# Patient Record
Sex: FEMALE | Race: WHITE | NOT HISPANIC OR LATINO | URBAN - METROPOLITAN AREA
[De-identification: names, ages, dates, MRNs, and addresses within clinical notes are randomized per-mention and may not be internally consistent; named-entity substitution may affect disease eponyms.]

---

## 2017-06-19 ENCOUNTER — EMERGENCY (EMERGENCY)
Facility: HOSPITAL | Age: 82
LOS: 1 days | Discharge: HOME | End: 2017-06-19
Attending: EMERGENCY MEDICINE | Admitting: EMERGENCY MEDICINE
Payer: MEDICARE

## 2017-06-19 VITALS
SYSTOLIC BLOOD PRESSURE: 173 MMHG | RESPIRATION RATE: 18 BRPM | DIASTOLIC BLOOD PRESSURE: 74 MMHG | HEART RATE: 68 BPM | OXYGEN SATURATION: 99 %

## 2017-06-19 VITALS
SYSTOLIC BLOOD PRESSURE: 174 MMHG | DIASTOLIC BLOOD PRESSURE: 110 MMHG | OXYGEN SATURATION: 98 % | RESPIRATION RATE: 17 BRPM | HEART RATE: 62 BPM

## 2017-06-19 LAB
ALBUMIN SERPL ELPH-MCNC: 3.8 G/DL — SIGNIFICANT CHANGE UP (ref 3.3–5)
ALP SERPL-CCNC: 65 U/L — SIGNIFICANT CHANGE UP (ref 40–120)
ALT FLD-CCNC: 20 U/L — SIGNIFICANT CHANGE UP (ref 4–33)
AST SERPL-CCNC: 25 U/L — SIGNIFICANT CHANGE UP (ref 4–32)
BASOPHILS # BLD AUTO: 0.03 K/UL — SIGNIFICANT CHANGE UP (ref 0–0.2)
BASOPHILS NFR BLD AUTO: 0.2 % — SIGNIFICANT CHANGE UP (ref 0–2)
BILIRUB SERPL-MCNC: 0.4 MG/DL — SIGNIFICANT CHANGE UP (ref 0.2–1.2)
BUN SERPL-MCNC: 25 MG/DL — HIGH (ref 7–23)
CALCIUM SERPL-MCNC: 9.3 MG/DL — SIGNIFICANT CHANGE UP (ref 8.4–10.5)
CHLORIDE SERPL-SCNC: 94 MMOL/L — LOW (ref 98–107)
CO2 SERPL-SCNC: 28 MMOL/L — SIGNIFICANT CHANGE UP (ref 22–31)
CREAT SERPL-MCNC: 1.02 MG/DL — SIGNIFICANT CHANGE UP (ref 0.5–1.3)
DIGOXIN SERPL-MCNC: 0.9 NG/ML — SIGNIFICANT CHANGE UP (ref 0.8–2)
EOSINOPHIL # BLD AUTO: 0.15 K/UL — SIGNIFICANT CHANGE UP (ref 0–0.5)
EOSINOPHIL NFR BLD AUTO: 1 % — SIGNIFICANT CHANGE UP (ref 0–6)
GLUCOSE SERPL-MCNC: 152 MG/DL — HIGH (ref 70–99)
HCT VFR BLD CALC: 43.9 % — SIGNIFICANT CHANGE UP (ref 34.5–45)
HGB BLD-MCNC: 14.6 G/DL — SIGNIFICANT CHANGE UP (ref 11.5–15.5)
IMM GRANULOCYTES NFR BLD AUTO: 0.9 % — SIGNIFICANT CHANGE UP (ref 0–1.5)
LYMPHOCYTES # BLD AUTO: 18.9 % — SIGNIFICANT CHANGE UP (ref 13–44)
LYMPHOCYTES # BLD AUTO: 2.86 K/UL — SIGNIFICANT CHANGE UP (ref 1–3.3)
MCHC RBC-ENTMCNC: 30.4 PG — SIGNIFICANT CHANGE UP (ref 27–34)
MCHC RBC-ENTMCNC: 33.3 % — SIGNIFICANT CHANGE UP (ref 32–36)
MCV RBC AUTO: 91.5 FL — SIGNIFICANT CHANGE UP (ref 80–100)
MONOCYTES # BLD AUTO: 1.49 K/UL — HIGH (ref 0–0.9)
MONOCYTES NFR BLD AUTO: 9.8 % — SIGNIFICANT CHANGE UP (ref 2–14)
NEUTROPHILS # BLD AUTO: 10.48 K/UL — HIGH (ref 1.8–7.4)
NEUTROPHILS NFR BLD AUTO: 69.2 % — SIGNIFICANT CHANGE UP (ref 43–77)
PLATELET # BLD AUTO: 244 K/UL — SIGNIFICANT CHANGE UP (ref 150–400)
PMV BLD: 9.6 FL — SIGNIFICANT CHANGE UP (ref 7–13)
POTASSIUM SERPL-MCNC: 3.7 MMOL/L — SIGNIFICANT CHANGE UP (ref 3.5–5.3)
POTASSIUM SERPL-SCNC: 3.7 MMOL/L — SIGNIFICANT CHANGE UP (ref 3.5–5.3)
PROT SERPL-MCNC: 6.9 G/DL — SIGNIFICANT CHANGE UP (ref 6–8.3)
RBC # BLD: 4.8 M/UL — SIGNIFICANT CHANGE UP (ref 3.8–5.2)
RBC # FLD: 12.9 % — SIGNIFICANT CHANGE UP (ref 10.3–14.5)
SODIUM SERPL-SCNC: 137 MMOL/L — SIGNIFICANT CHANGE UP (ref 135–145)
WBC # BLD: 15.15 K/UL — HIGH (ref 3.8–10.5)
WBC # FLD AUTO: 15.15 K/UL — HIGH (ref 3.8–10.5)

## 2017-06-19 PROCEDURE — 71020: CPT | Mod: 26

## 2017-06-19 PROCEDURE — 99284 EMERGENCY DEPT VISIT MOD MDM: CPT | Mod: GC

## 2017-06-19 RX ORDER — IPRATROPIUM/ALBUTEROL SULFATE 18-103MCG
3 AEROSOL WITH ADAPTER (GRAM) INHALATION ONCE
Qty: 0 | Refills: 0 | Status: COMPLETED | OUTPATIENT
Start: 2017-06-19 | End: 2017-06-19

## 2017-06-19 RX ADMIN — Medication 3 MILLILITER(S): at 11:19

## 2017-06-19 NOTE — ED PROVIDER NOTE - MEDICAL DECISION MAKING DETAILS
89 f h/o htn and chf, with bilat LE numbness, no focal deficits on exam, with wheezing and diffuse rhonchi, will get basic labs, digoxin level, cxr, duonebs and reassess

## 2017-06-19 NOTE — ED PROVIDER NOTE - OBJECTIVE STATEMENT
89 F h/o chf, HTN, p/w bilat LE numbness beginning suddenly this AM with lip tingling. Patient denies symptoms like this in the past. No weakness/tingling of extremities. Was able to ambulate. No facial droop/change in speech. No fevers/chills. Patient also with cough and congestion x 1 week, on cipro and steroids from pmd. No back pain, urinary/stool incontinence.

## 2017-06-19 NOTE — ED PROVIDER NOTE - ATTENDING CONTRIBUTION TO CARE
I performed a face to face bedside interview with patient regarding history of present illness, review of symptoms and past medical history. I completed an independent physical exam.  I have discussed patient's plan of care.   I agree with note as stated above, having amended the EMR as needed to reflect my findings. I have discussed the assessment and plan of care.  This includes during the time I functioned as the attending physician for this patient.  Attending Contribution to Care:  agree with plan of resident, pt p/w mild paresthesias b/l lower extremities with no radiation no headache. neuro exam benign. labs wnl except leukocytosis a/w recent steroid use. pt stable for d/c. f/u with pmd. pt requesting d/c. understands risks. labs wnl, vss.

## 2017-06-19 NOTE — ED ADULT TRIAGE NOTE - CHIEF COMPLAINT QUOTE
pt c/o of lower extremity weakness and tingling to her lips since 8 am this morning. pt recently placed on cipro to treat a URI. pt hypertensive in triage, took her htn medication as rx'd this morning  MD Zaidi called to triage for stroke eval. no code stroke called

## 2017-06-19 NOTE — ED ADULT NURSE NOTE - OBJECTIVE STATEMENT
Pt rec'd in TRB with family at bedside, c/o BLE numbness since this morning when she got out of the bed, was able to ambulate with walker, didn't feel any weakness or pain, denies tingling. Pt also reports a period of numbness to lips that has since resolved. Pt is A&Ox4 and at her baseline mental status per family. Pt currently on Cipro. Abd noted distended at baseline.

## 2017-10-28 ENCOUNTER — INPATIENT (INPATIENT)
Facility: HOSPITAL | Age: 82
LOS: 2 days | Discharge: HOME | End: 2017-10-31
Attending: HOSPITALIST | Admitting: HOSPITALIST
Payer: MEDICARE

## 2017-10-28 VITALS
DIASTOLIC BLOOD PRESSURE: 106 MMHG | SYSTOLIC BLOOD PRESSURE: 166 MMHG | HEART RATE: 61 BPM | RESPIRATION RATE: 18 BRPM | OXYGEN SATURATION: 99 % | TEMPERATURE: 98 F

## 2017-10-28 DIAGNOSIS — I50.9 HEART FAILURE, UNSPECIFIED: ICD-10-CM

## 2017-10-28 DIAGNOSIS — Z29.9 ENCOUNTER FOR PROPHYLACTIC MEASURES, UNSPECIFIED: ICD-10-CM

## 2017-10-28 DIAGNOSIS — I10 ESSENTIAL (PRIMARY) HYPERTENSION: ICD-10-CM

## 2017-10-28 DIAGNOSIS — Z90.49 ACQUIRED ABSENCE OF OTHER SPECIFIED PARTS OF DIGESTIVE TRACT: Chronic | ICD-10-CM

## 2017-10-28 DIAGNOSIS — D72.829 ELEVATED WHITE BLOOD CELL COUNT, UNSPECIFIED: ICD-10-CM

## 2017-10-28 DIAGNOSIS — R73.9 HYPERGLYCEMIA, UNSPECIFIED: ICD-10-CM

## 2017-10-28 LAB
ALBUMIN SERPL ELPH-MCNC: 3.7 G/DL — SIGNIFICANT CHANGE UP (ref 3.3–5)
ALP SERPL-CCNC: 130 U/L — HIGH (ref 40–120)
ALT FLD-CCNC: 10 U/L — SIGNIFICANT CHANGE UP (ref 4–33)
AST SERPL-CCNC: 14 U/L — SIGNIFICANT CHANGE UP (ref 4–32)
BASE EXCESS BLDV CALC-SCNC: 6.2 MMOL/L — SIGNIFICANT CHANGE UP
BASOPHILS # BLD AUTO: 0.08 K/UL — SIGNIFICANT CHANGE UP (ref 0–0.2)
BASOPHILS NFR BLD AUTO: 0.4 % — SIGNIFICANT CHANGE UP (ref 0–2)
BASOPHILS NFR SPEC: 0 % — SIGNIFICANT CHANGE UP (ref 0–2)
BILIRUB SERPL-MCNC: 0.3 MG/DL — SIGNIFICANT CHANGE UP (ref 0.2–1.2)
BLOOD GAS VENOUS - CREATININE: 1.06 MG/DL — SIGNIFICANT CHANGE UP (ref 0.5–1.3)
BUN SERPL-MCNC: 22 MG/DL — SIGNIFICANT CHANGE UP (ref 7–23)
CALCIUM SERPL-MCNC: 9.5 MG/DL — SIGNIFICANT CHANGE UP (ref 8.4–10.5)
CHLORIDE BLDV-SCNC: 97 MMOL/L — SIGNIFICANT CHANGE UP (ref 96–108)
CHLORIDE SERPL-SCNC: 94 MMOL/L — LOW (ref 98–107)
CK MB BLD-MCNC: 5.37 NG/ML — HIGH (ref 1–4.7)
CK MB BLD-MCNC: SIGNIFICANT CHANGE UP (ref 0–2.5)
CK SERPL-CCNC: 81 U/L — SIGNIFICANT CHANGE UP (ref 25–170)
CO2 SERPL-SCNC: 30 MMOL/L — SIGNIFICANT CHANGE UP (ref 22–31)
CREAT SERPL-MCNC: 1.06 MG/DL — SIGNIFICANT CHANGE UP (ref 0.5–1.3)
EOSINOPHIL # BLD AUTO: 2.95 K/UL — HIGH (ref 0–0.5)
EOSINOPHIL NFR BLD AUTO: 16.2 % — HIGH (ref 0–6)
EOSINOPHIL NFR FLD: 10 % — HIGH (ref 0–6)
GAS PNL BLDV: 136 MMOL/L — SIGNIFICANT CHANGE UP (ref 136–146)
GLUCOSE BLDV-MCNC: 197 — HIGH (ref 70–99)
GLUCOSE SERPL-MCNC: 186 MG/DL — HIGH (ref 70–99)
HCO3 BLDV-SCNC: 28 MMOL/L — HIGH (ref 20–27)
HCT VFR BLD CALC: 42.1 % — SIGNIFICANT CHANGE UP (ref 34.5–45)
HCT VFR BLDV CALC: 45 % — SIGNIFICANT CHANGE UP (ref 34.5–45)
HGB BLD-MCNC: 14.1 G/DL — SIGNIFICANT CHANGE UP (ref 11.5–15.5)
HGB BLDV-MCNC: 14.7 G/DL — SIGNIFICANT CHANGE UP (ref 11.5–15.5)
IMM GRANULOCYTES # BLD AUTO: 0.13 # — SIGNIFICANT CHANGE UP
IMM GRANULOCYTES NFR BLD AUTO: 0.7 % — SIGNIFICANT CHANGE UP (ref 0–1.5)
LACTATE BLDV-MCNC: 1.6 MMOL/L — SIGNIFICANT CHANGE UP (ref 0.5–2)
LYMPHOCYTES # BLD AUTO: 1.59 K/UL — SIGNIFICANT CHANGE UP (ref 1–3.3)
LYMPHOCYTES # BLD AUTO: 8.7 % — LOW (ref 13–44)
LYMPHOCYTES NFR SPEC AUTO: 6 % — LOW (ref 13–44)
MANUAL SMEAR VERIFICATION: SIGNIFICANT CHANGE UP
MCHC RBC-ENTMCNC: 29.9 PG — SIGNIFICANT CHANGE UP (ref 27–34)
MCHC RBC-ENTMCNC: 33.5 % — SIGNIFICANT CHANGE UP (ref 32–36)
MCV RBC AUTO: 89.2 FL — SIGNIFICANT CHANGE UP (ref 80–100)
MONOCYTES # BLD AUTO: 1.05 K/UL — HIGH (ref 0–0.9)
MONOCYTES NFR BLD AUTO: 5.8 % — SIGNIFICANT CHANGE UP (ref 2–14)
MONOCYTES NFR BLD: 5 % — SIGNIFICANT CHANGE UP (ref 2–9)
NEUTROPHIL AB SER-ACNC: 79 % — HIGH (ref 43–77)
NEUTROPHILS # BLD AUTO: 12.38 K/UL — HIGH (ref 1.8–7.4)
NEUTROPHILS NFR BLD AUTO: 68.2 % — SIGNIFICANT CHANGE UP (ref 43–77)
NRBC # FLD: 0 — SIGNIFICANT CHANGE UP
NT-PROBNP SERPL-SCNC: 838.5 PG/ML — SIGNIFICANT CHANGE UP
PCO2 BLDV: 52 MMHG — HIGH (ref 41–51)
PH BLDV: 7.39 PH — SIGNIFICANT CHANGE UP (ref 7.32–7.43)
PLATELET # BLD AUTO: 204 K/UL — SIGNIFICANT CHANGE UP (ref 150–400)
PMV BLD: 9.3 FL — SIGNIFICANT CHANGE UP (ref 7–13)
PO2 BLDV: 35 MMHG — SIGNIFICANT CHANGE UP (ref 35–40)
POTASSIUM BLDV-SCNC: 3.3 MMOL/L — LOW (ref 3.4–4.5)
POTASSIUM SERPL-MCNC: 3.5 MMOL/L — SIGNIFICANT CHANGE UP (ref 3.5–5.3)
POTASSIUM SERPL-SCNC: 3.5 MMOL/L — SIGNIFICANT CHANGE UP (ref 3.5–5.3)
PROT SERPL-MCNC: 7.1 G/DL — SIGNIFICANT CHANGE UP (ref 6–8.3)
RBC # BLD: 4.72 M/UL — SIGNIFICANT CHANGE UP (ref 3.8–5.2)
RBC # FLD: 12.3 % — SIGNIFICANT CHANGE UP (ref 10.3–14.5)
SAO2 % BLDV: 59.8 % — LOW (ref 60–85)
SODIUM SERPL-SCNC: 137 MMOL/L — SIGNIFICANT CHANGE UP (ref 135–145)
TROPONIN T SERPL-MCNC: < 0.06 NG/ML — SIGNIFICANT CHANGE UP (ref 0–0.06)
WBC # BLD: 18.18 K/UL — HIGH (ref 3.8–10.5)
WBC # FLD AUTO: 18.18 K/UL — HIGH (ref 3.8–10.5)

## 2017-10-28 PROCEDURE — 99223 1ST HOSP IP/OBS HIGH 75: CPT

## 2017-10-28 PROCEDURE — 71010: CPT | Mod: 26

## 2017-10-28 RX ORDER — SENNA PLUS 8.6 MG/1
2 TABLET ORAL AT BEDTIME
Qty: 0 | Refills: 0 | Status: DISCONTINUED | OUTPATIENT
Start: 2017-10-28 | End: 2017-10-31

## 2017-10-28 RX ORDER — IPRATROPIUM/ALBUTEROL SULFATE 18-103MCG
3 AEROSOL WITH ADAPTER (GRAM) INHALATION
Qty: 0 | Refills: 0 | Status: COMPLETED | OUTPATIENT
Start: 2017-10-28 | End: 2017-10-28

## 2017-10-28 RX ORDER — ASPIRIN/CALCIUM CARB/MAGNESIUM 324 MG
81 TABLET ORAL DAILY
Qty: 0 | Refills: 0 | Status: DISCONTINUED | OUTPATIENT
Start: 2017-10-28 | End: 2017-10-31

## 2017-10-28 RX ORDER — HEPARIN SODIUM 5000 [USP'U]/ML
5000 INJECTION INTRAVENOUS; SUBCUTANEOUS EVERY 12 HOURS
Qty: 0 | Refills: 0 | Status: DISCONTINUED | OUTPATIENT
Start: 2017-10-28 | End: 2017-10-31

## 2017-10-28 RX ORDER — POTASSIUM CHLORIDE 20 MEQ
40 PACKET (EA) ORAL ONCE
Qty: 0 | Refills: 0 | Status: COMPLETED | OUTPATIENT
Start: 2017-10-28 | End: 2017-10-28

## 2017-10-28 RX ORDER — DOCUSATE SODIUM 100 MG
100 CAPSULE ORAL DAILY
Qty: 0 | Refills: 0 | Status: DISCONTINUED | OUTPATIENT
Start: 2017-10-28 | End: 2017-10-31

## 2017-10-28 RX ORDER — DIGOXIN 250 MCG
0.12 TABLET ORAL DAILY
Qty: 0 | Refills: 0 | Status: DISCONTINUED | OUTPATIENT
Start: 2017-10-28 | End: 2017-10-31

## 2017-10-28 RX ORDER — MAGNESIUM SULFATE 500 MG/ML
1 VIAL (ML) INJECTION ONCE
Qty: 0 | Refills: 0 | Status: COMPLETED | OUTPATIENT
Start: 2017-10-28 | End: 2017-10-28

## 2017-10-28 RX ORDER — FUROSEMIDE 40 MG
20 TABLET ORAL ONCE
Qty: 0 | Refills: 0 | Status: COMPLETED | OUTPATIENT
Start: 2017-10-28 | End: 2017-10-28

## 2017-10-28 RX ORDER — METOPROLOL TARTRATE 50 MG
100 TABLET ORAL DAILY
Qty: 0 | Refills: 0 | Status: DISCONTINUED | OUTPATIENT
Start: 2017-10-28 | End: 2017-10-31

## 2017-10-28 RX ADMIN — Medication 3 MILLILITER(S): at 16:47

## 2017-10-28 RX ADMIN — Medication 100 GRAM(S): at 18:35

## 2017-10-28 RX ADMIN — Medication 3 MILLILITER(S): at 16:37

## 2017-10-28 RX ADMIN — Medication 20 MILLIGRAM(S): at 16:37

## 2017-10-28 RX ADMIN — Medication 40 MILLIEQUIVALENT(S): at 18:38

## 2017-10-28 RX ADMIN — Medication 3 MILLILITER(S): at 16:49

## 2017-10-28 NOTE — ED PROVIDER NOTE - OBJECTIVE STATEMENT
89F PMHx HTN, CHF p/w SOB + GARDUNO x 1d. She reports that for the last 2-3 days her SOB has been progressively worsening, especially on talking or ambulating in addition to pre-existing GARDUNO. She sleeps in a recliner, unable to lie flat at home for a prolonged period of time + PND, orthopnea. Per daughter at bedside, patient has been consuming increased salty foods. She denies chest pain, palpitations, diaphoresis, fever, chills, night sweats, sick contact or recent travel.

## 2017-10-28 NOTE — ED ADULT NURSE REASSESSMENT NOTE - NS ED NURSE REASSESS COMMENT FT1
pt refuses to go to MRI in stretcher- pt states it hurts her to much- pt educated that her spine should have supported while in transit- pt refuses to get into stretcher -Dr. Paris aware of the situation

## 2017-10-28 NOTE — ED ADULT NURSE NOTE - OBJECTIVE STATEMENT
Pt A+OX3 c/o SOB x2-3 days.  Denies CP, fever, or cough.  States edema to julio césar LE.  2+ pedal edema noted.  Julio César expiratory wheeze noted.  Denies asthma.  CM placed.  EKG done.  Labs obtained and sent as ordered.  PIV placed.  Family at bedside.  MD at bedside Pt A+OX3 c/o SOB x2-3 days.  Denies CP, fever, or cough.  States edema to julio césar LE.  2+ pedal edema noted.  Julio César expiratory wheeze noted.  O2 sat 92% RA.  O2 2L NC placed with O2 sat increasing to 97%.  Denies asthma.  CM placed.  EKG done.  Labs obtained and sent as ordered.  PIV placed.  Family at bedside.  MD at bedside

## 2017-10-28 NOTE — H&P ADULT - ATTENDING COMMENTS
89 y/o F with HTN and CHF p/w dyspnea s/p furosemide in the ED.  Pt currently feels breathing has improved.    On exam, in NAD.  Lungs clear with no rales.  LE with trace edema  Plan: will continue diuresis with lasix 20mg IV daily, and monitor output

## 2017-10-28 NOTE — H&P ADULT - PROBLEM SELECTOR PROBLEM 1
Chronic congestive heart failure, unspecified congestive heart failure type Acute on chronic systolic congestive heart failure

## 2017-10-28 NOTE — ED PROVIDER NOTE - MEDICAL DECISION MAKING DETAILS
89F PMHx HTN, CHF p/w dyspnea + SOB and orthopnea. No evidence of arrythmia, tachycardia, hypoxia or chest pain. Low concern for ACS, will admit for CHF exacerbation. CBC, BMP, Cardiac enzymes, BNP, CXR

## 2017-10-28 NOTE — H&P ADULT - NSHPLABSRESULTS_GEN_ALL_CORE
10-28    137  |  94<L>  |  22  ----------------------------<  186<H>  3.5   |  30  |  1.06    Ca    9.5      28 Oct 2017 16:30    TPro  7.1  /  Alb  3.7  /  TBili  0.3  /  DBili  x   /  AST  14  /  ALT  10  /  AlkPhos  130<H>  10-28                            14.1   18.18 )-----------( 204      ( 28 Oct 2017 16:30 )             42.1       CARDIAC MARKERS ( 29 Oct 2017 01:55 )  x     / < 0.06 ng/mL / 111 u/L / x     / x      CARDIAC MARKERS ( 28 Oct 2017 16:30 )  x     / < 0.06 ng/mL / 81 u/L / 5.37 ng/mL / x

## 2017-10-28 NOTE — H&P ADULT - PROBLEM SELECTOR PLAN 2
WBC: 18.18 likely reactive process. Patient denied any subjective complaints. Vital signs unremarkable   Will monitor for now

## 2017-10-28 NOTE — H&P ADULT - NEGATIVE NEUROLOGICAL SYMPTOMS
no loss of consciousness/no hemiparesis/no generalized seizures/no focal seizures/no weakness/no tremors/no headache/no paresthesias/no confusion/no difficulty walking/no syncope/no vertigo/no loss of sensation/no transient paralysis

## 2017-10-28 NOTE — H&P ADULT - NEGATIVE OPHTHALMOLOGIC SYMPTOMS
no diplopia/no blurred vision L/no photophobia/no discharge L/no blurred vision R/no discharge R/no lacrimation L/no lacrimation R

## 2017-10-28 NOTE — ED PROVIDER NOTE - ATTENDING CONTRIBUTION TO CARE
89F h/o HTN, CHF, here with increasing SOB, orthopnea, led edema. Patient denies headache, vision changes, focal weakness/numbness, neck pain, fever, cough, chest pain, back pain, abd pain, nausea, vomiting, diarrhea, constipation, blood in stool, dysuria, rash, trauma, falls. Patient is well appearing, conversant, cooperative, smiling, head atraumatic, neck supple with full range of motion, oropharynx clear, lungs with wheeze, heart clear, no murmurs, distal pulses equal in all 4 extremities, abdomen soft nontender nondistended with no masses, leg edema to mid leg, no calf tenderness, nonfocal neurologic exam. Lasix, ekg, troponin, aspirin, cxr, reassess, consider admission for chf exacerbation.

## 2017-10-28 NOTE — H&P ADULT - PROBLEM SELECTOR PLAN 1
Monitor on telemetry, serial Nestor, serial EKGs  HgbA1C, TSH, lipid profile, CBC, CMP in am   Low sodium diet, daily weights, monitor I's and O's, fluid restrictions 1000cc/day  Check BNP in 48 hours   Started on Lasix IV 20mg QD    TTE ordered to evaluate LVEF  Continue with Digoxin. Will check Digoxin level with labs   Educated patient the importance of healthy eating

## 2017-10-28 NOTE — ED ADULT NURSE REASSESSMENT NOTE - NS ED NURSE REASSESS COMMENT FT1
pt left unit at 930- pt in NAD, denies any chest pain, SOB, n/v/d, dizziness- spo2 100% on 2L nasal cannula, oxygen tank full-  family at bedside, belongings on bed

## 2017-10-28 NOTE — H&P ADULT - RS GEN PE MLT RESP DETAILS PC
no intercostal retractions/normal/airway patent/no wheezes/no chest wall tenderness/no rhonchi/rales/respirations non-labored

## 2017-10-28 NOTE — H&P ADULT - NEGATIVE MUSCULOSKELETAL SYMPTOMS
no stiffness/no neck pain/no joint swelling/no muscle weakness/no myalgia/no muscle cramps/no arthritis/no arthralgia

## 2017-10-28 NOTE — H&P ADULT - HISTORY OF PRESENT ILLNESS
89 y/o F who walks with a walker with a PMH of HTN, CHF presented with the complaint of SOB and GARDUNO. As per the patient she has been having intermittent chronic baseline SOB for the past few months but the past few days the SOB worsened. Patient has noticed that the SOB is worse when she is lying down, and normally she sleeps on a recliner. Patient stated that walking from her bedroom to the bathroom which is 50 feet she would feel very winded and SOB, but when she rest the SOB resolves. Patient also endorsed bilateral LE swelling(which she is unsure how long she had for), orthopnea and PND. Patient stated that she is compliant with her heart medications, but eats "a lot of junk food like [employer], [employer] and potato chips". Patient also endorsed dry cough for the past few weeks. Patient also endorsed chronic constipation, but stated that she had "small amount of bowel movement yesterday". Patient did endorse abdominal distention, but stated that "she has had that for the longest time". Patient denied any CP, fevers, chills, N/V/D, abdominal pain, melena, hematochezia, recent travel, sick contact, dysuria, pleuritic or positional chest pain. Patient did endorse choking to aspirin a month ago, but stated that she has no problem with swallowing food or water.    On ED admission EKG revealed NSR with sinus arrhythmia, LVH at a rate of 60 with Qtc of 356 and TWI in lead I, AVL, V2-V5, CE x 1: Negative, WBC: 19.18, Gluc: 186, Alk Phos: 130, BNP: 838.5. Prelim CXR: clear lungs. Patient received 1x dose of Lasix 20mg IVP, Duonebs and IV Magnesium in the ED. When examined patient is resting in the stretcher and stated that "her breathing is improved, and better that when she came in".

## 2017-10-28 NOTE — H&P ADULT - ASSESSMENT
89 y/o F with PMH of HTN, CHF presented with the complaint of SOB and GARDUNO.     +CHF-On IV Lasix 20mg QD

## 2017-10-28 NOTE — ED ADULT NURSE NOTE - ED STAT RN HANDOFF DETAILS
handoff report given to GERMAN Dee pt in NAD, awaiting transportation.  Will continue to monitor patient closely.

## 2017-10-29 DIAGNOSIS — I50.23 ACUTE ON CHRONIC SYSTOLIC (CONGESTIVE) HEART FAILURE: ICD-10-CM

## 2017-10-29 LAB
CHOLEST SERPL-MCNC: 184 MG/DL — SIGNIFICANT CHANGE UP (ref 120–199)
CK SERPL-CCNC: 111 U/L — SIGNIFICANT CHANGE UP (ref 25–170)
DIGOXIN SERPL-MCNC: 0.9 NG/ML — SIGNIFICANT CHANGE UP (ref 0.8–2)
HBA1C BLD-MCNC: 8.1 % — HIGH (ref 4–5.6)
HCT VFR BLD CALC: 40.9 % — SIGNIFICANT CHANGE UP (ref 34.5–45)
HDLC SERPL-MCNC: 38 MG/DL — LOW (ref 45–65)
HGB BLD-MCNC: 13.7 G/DL — SIGNIFICANT CHANGE UP (ref 11.5–15.5)
LIPID PNL WITH DIRECT LDL SERPL: 127 MG/DL — SIGNIFICANT CHANGE UP
MCHC RBC-ENTMCNC: 29.7 PG — SIGNIFICANT CHANGE UP (ref 27–34)
MCHC RBC-ENTMCNC: 33.5 % — SIGNIFICANT CHANGE UP (ref 32–36)
MCV RBC AUTO: 88.5 FL — SIGNIFICANT CHANGE UP (ref 80–100)
NRBC # FLD: 0 — SIGNIFICANT CHANGE UP
PLATELET # BLD AUTO: 210 K/UL — SIGNIFICANT CHANGE UP (ref 150–400)
PMV BLD: 9.4 FL — SIGNIFICANT CHANGE UP (ref 7–13)
RBC # BLD: 4.62 M/UL — SIGNIFICANT CHANGE UP (ref 3.8–5.2)
RBC # FLD: 12.5 % — SIGNIFICANT CHANGE UP (ref 10.3–14.5)
TRIGL SERPL-MCNC: 170 MG/DL — HIGH (ref 10–149)
TROPONIN T SERPL-MCNC: < 0.06 NG/ML — SIGNIFICANT CHANGE UP (ref 0–0.06)
TSH SERPL-MCNC: 2.32 UIU/ML — SIGNIFICANT CHANGE UP (ref 0.27–4.2)
WBC # BLD: 18.29 K/UL — HIGH (ref 3.8–10.5)
WBC # FLD AUTO: 18.29 K/UL — HIGH (ref 3.8–10.5)

## 2017-10-29 PROCEDURE — 99233 SBSQ HOSP IP/OBS HIGH 50: CPT | Mod: GC

## 2017-10-29 RX ORDER — FUROSEMIDE 40 MG
20 TABLET ORAL DAILY
Qty: 0 | Refills: 0 | Status: DISCONTINUED | OUTPATIENT
Start: 2017-10-29 | End: 2017-10-31

## 2017-10-29 RX ADMIN — HEPARIN SODIUM 5000 UNIT(S): 5000 INJECTION INTRAVENOUS; SUBCUTANEOUS at 17:44

## 2017-10-29 RX ADMIN — Medication 100 MILLIGRAM(S): at 05:49

## 2017-10-29 RX ADMIN — Medication 81 MILLIGRAM(S): at 11:51

## 2017-10-29 RX ADMIN — HEPARIN SODIUM 5000 UNIT(S): 5000 INJECTION INTRAVENOUS; SUBCUTANEOUS at 05:49

## 2017-10-29 RX ADMIN — Medication 0.12 MILLIGRAM(S): at 05:49

## 2017-10-29 RX ADMIN — Medication 20 MILLIGRAM(S): at 05:49

## 2017-10-29 NOTE — PROGRESS NOTE ADULT - PROBLEM SELECTOR PLAN 3
A1C 8.1, patient has new diagnosis of diabetes  will consult diabetic specialist  FS checks TID and at bedtime A1C 8.1, patient has new diagnosis of diabetes  will consult diabetic specialist  FS checks TID and at bedtime  elevated TG and low HDL, will consider statin after CKs are resulted. Slight rise in CK A1C 8.1, patient has new diagnosis of diabetes  Informed patient of new diagnosis. Explained to patient that will likely need diet control and not insulin   Patient very upset  will consult diabetic specialist  FS checks TID and at bedtime  elevated TG and low HDL, will consider statin after CKs are resulted. Slight rise in CK

## 2017-10-29 NOTE — DIETITIAN INITIAL EVALUATION ADULT. - OTHER INFO
Pt states that her appetite has been good and she has been eating well. Pt admits to eating foods high in sodium like [employer] and potato chips. Pt's Hgb A1c level was 8.0 but considering Pt's age consistent carb diet is not needed. Instructed Pt and daughter on DASH diet. Also requested Pt limit her sweets intake. Both verbalized good understanding. Pt had no complaints of GI distress nor of difficulty chewing or swallowing.

## 2017-10-29 NOTE — PROGRESS NOTE ADULT - PROBLEM SELECTOR PLAN 1
Patient admits to eating excess salt especially potato chips and fast food  denies noncompliance with medications  Feels better since admission  f/u ECHO  f/u BNP  c/w lasix IV 20mg and monitor Is and Os Patient admits to eating excess salt especially potato chips and fast food  denies noncompliance with medications  Feels better since admission  f/u ECHO  f/u BNP  c/w lasix IV 20mg and monitor Is and Os  f/u third set of Nestor, first 2 sets negative

## 2017-10-30 DIAGNOSIS — E11.65 TYPE 2 DIABETES MELLITUS WITH HYPERGLYCEMIA: ICD-10-CM

## 2017-10-30 DIAGNOSIS — D72.1 EOSINOPHILIA: ICD-10-CM

## 2017-10-30 DIAGNOSIS — I10 ESSENTIAL (PRIMARY) HYPERTENSION: ICD-10-CM

## 2017-10-30 LAB
APPEARANCE UR: CLEAR — SIGNIFICANT CHANGE UP
BASOPHILS # BLD AUTO: 0.13 K/UL — SIGNIFICANT CHANGE UP (ref 0–0.2)
BASOPHILS NFR BLD AUTO: 0.6 % — SIGNIFICANT CHANGE UP (ref 0–2)
BILIRUB UR-MCNC: NEGATIVE — SIGNIFICANT CHANGE UP
BLOOD UR QL VISUAL: NEGATIVE — SIGNIFICANT CHANGE UP
BUN SERPL-MCNC: 23 MG/DL — SIGNIFICANT CHANGE UP (ref 7–23)
CALCIUM SERPL-MCNC: 9.8 MG/DL — SIGNIFICANT CHANGE UP (ref 8.4–10.5)
CHLORIDE SERPL-SCNC: 95 MMOL/L — LOW (ref 98–107)
CK MB BLD-MCNC: 3.6 — HIGH (ref 0–2.5)
CK MB BLD-MCNC: 6.33 NG/ML — HIGH (ref 1–4.7)
CK SERPL-CCNC: 177 U/L — HIGH (ref 25–170)
CO2 SERPL-SCNC: 27 MMOL/L — SIGNIFICANT CHANGE UP (ref 22–31)
COLOR SPEC: SIGNIFICANT CHANGE UP
CREAT SERPL-MCNC: 0.96 MG/DL — SIGNIFICANT CHANGE UP (ref 0.5–1.3)
EOSINOPHIL # BLD AUTO: 5.33 K/UL — HIGH (ref 0–0.5)
EOSINOPHIL NFR BLD AUTO: 26.6 % — HIGH (ref 0–6)
GLUCOSE SERPL-MCNC: 157 MG/DL — HIGH (ref 70–99)
GLUCOSE UR-MCNC: NEGATIVE — SIGNIFICANT CHANGE UP
HCT VFR BLD CALC: 47.3 % — HIGH (ref 34.5–45)
HCT VFR BLD CALC: 47.3 % — HIGH (ref 34.5–45)
HGB BLD-MCNC: 15.6 G/DL — HIGH (ref 11.5–15.5)
HGB BLD-MCNC: 15.6 G/DL — HIGH (ref 11.5–15.5)
IMM GRANULOCYTES # BLD AUTO: 0.12 # — SIGNIFICANT CHANGE UP
IMM GRANULOCYTES NFR BLD AUTO: 0.6 % — SIGNIFICANT CHANGE UP (ref 0–1.5)
KETONES UR-MCNC: NEGATIVE — SIGNIFICANT CHANGE UP
LEUKOCYTE ESTERASE UR-ACNC: NEGATIVE — SIGNIFICANT CHANGE UP
LYMPHOCYTES # BLD AUTO: 26.9 % — SIGNIFICANT CHANGE UP (ref 13–44)
LYMPHOCYTES # BLD AUTO: 5.39 K/UL — HIGH (ref 1–3.3)
MAGNESIUM SERPL-MCNC: 1.8 MG/DL — SIGNIFICANT CHANGE UP (ref 1.6–2.6)
MANUAL SMEAR VERIFICATION: SIGNIFICANT CHANGE UP
MANUAL SMEAR VERIFICATION: SIGNIFICANT CHANGE UP
MCHC RBC-ENTMCNC: 29.7 PG — SIGNIFICANT CHANGE UP (ref 27–34)
MCHC RBC-ENTMCNC: 29.7 PG — SIGNIFICANT CHANGE UP (ref 27–34)
MCHC RBC-ENTMCNC: 33 % — SIGNIFICANT CHANGE UP (ref 32–36)
MCHC RBC-ENTMCNC: 33 % — SIGNIFICANT CHANGE UP (ref 32–36)
MCV RBC AUTO: 89.9 FL — SIGNIFICANT CHANGE UP (ref 80–100)
MCV RBC AUTO: 89.9 FL — SIGNIFICANT CHANGE UP (ref 80–100)
MONOCYTES # BLD AUTO: 1.15 K/UL — HIGH (ref 0–0.9)
MONOCYTES NFR BLD AUTO: 5.7 % — SIGNIFICANT CHANGE UP (ref 2–14)
MUCOUS THREADS # UR AUTO: SIGNIFICANT CHANGE UP
NEUTROPHILS # BLD AUTO: 7.92 K/UL — HIGH (ref 1.8–7.4)
NEUTROPHILS NFR BLD AUTO: 39.6 % — LOW (ref 43–77)
NITRITE UR-MCNC: NEGATIVE — SIGNIFICANT CHANGE UP
NON-SQ EPI CELLS # UR AUTO: <1 — SIGNIFICANT CHANGE UP
NRBC # FLD: 0 — SIGNIFICANT CHANGE UP
NRBC # FLD: 0 — SIGNIFICANT CHANGE UP
NT-PROBNP SERPL-SCNC: 836.9 PG/ML — SIGNIFICANT CHANGE UP
PH UR: 6.5 — SIGNIFICANT CHANGE UP (ref 4.6–8)
PLATELET # BLD AUTO: 238 K/UL — SIGNIFICANT CHANGE UP (ref 150–400)
PLATELET # BLD AUTO: 238 K/UL — SIGNIFICANT CHANGE UP (ref 150–400)
PMV BLD: 9.6 FL — SIGNIFICANT CHANGE UP (ref 7–13)
PMV BLD: 9.6 FL — SIGNIFICANT CHANGE UP (ref 7–13)
POTASSIUM SERPL-MCNC: 3.5 MMOL/L — SIGNIFICANT CHANGE UP (ref 3.5–5.3)
POTASSIUM SERPL-SCNC: 3.5 MMOL/L — SIGNIFICANT CHANGE UP (ref 3.5–5.3)
PROT UR-MCNC: 20 — SIGNIFICANT CHANGE UP
RBC # BLD: 5.26 M/UL — HIGH (ref 3.8–5.2)
RBC # BLD: 5.26 M/UL — HIGH (ref 3.8–5.2)
RBC # FLD: 12.6 % — SIGNIFICANT CHANGE UP (ref 10.3–14.5)
RBC # FLD: 12.6 % — SIGNIFICANT CHANGE UP (ref 10.3–14.5)
RBC CASTS # UR COMP ASSIST: SIGNIFICANT CHANGE UP (ref 0–?)
SODIUM SERPL-SCNC: 138 MMOL/L — SIGNIFICANT CHANGE UP (ref 135–145)
SP GR SPEC: 1.01 — SIGNIFICANT CHANGE UP (ref 1–1.03)
SQUAMOUS # UR AUTO: SIGNIFICANT CHANGE UP
TROPONIN T SERPL-MCNC: < 0.06 NG/ML — SIGNIFICANT CHANGE UP (ref 0–0.06)
UROBILINOGEN FLD QL: NORMAL E.U. — SIGNIFICANT CHANGE UP (ref 0.1–0.2)
WBC # BLD: 20.37 K/UL — HIGH (ref 3.8–10.5)
WBC # BLD: 20.37 K/UL — HIGH (ref 3.8–10.5)
WBC # FLD AUTO: 20.37 K/UL — HIGH (ref 3.8–10.5)
WBC # FLD AUTO: 20.37 K/UL — HIGH (ref 3.8–10.5)
WBC UR QL: SIGNIFICANT CHANGE UP (ref 0–?)

## 2017-10-30 PROCEDURE — 99233 SBSQ HOSP IP/OBS HIGH 50: CPT

## 2017-10-30 PROCEDURE — 99233 SBSQ HOSP IP/OBS HIGH 50: CPT | Mod: GC

## 2017-10-30 PROCEDURE — 99222 1ST HOSP IP/OBS MODERATE 55: CPT | Mod: GC

## 2017-10-30 RX ORDER — DEXTROSE 50 % IN WATER 50 %
25 SYRINGE (ML) INTRAVENOUS ONCE
Qty: 0 | Refills: 0 | Status: DISCONTINUED | OUTPATIENT
Start: 2017-10-30 | End: 2017-10-31

## 2017-10-30 RX ORDER — INSULIN LISPRO 100/ML
VIAL (ML) SUBCUTANEOUS AT BEDTIME
Qty: 0 | Refills: 0 | Status: DISCONTINUED | OUTPATIENT
Start: 2017-10-30 | End: 2017-10-31

## 2017-10-30 RX ORDER — INSULIN LISPRO 100/ML
VIAL (ML) SUBCUTANEOUS
Qty: 0 | Refills: 0 | Status: DISCONTINUED | OUTPATIENT
Start: 2017-10-30 | End: 2017-10-31

## 2017-10-30 RX ORDER — DEXTROSE 50 % IN WATER 50 %
12.5 SYRINGE (ML) INTRAVENOUS ONCE
Qty: 0 | Refills: 0 | Status: DISCONTINUED | OUTPATIENT
Start: 2017-10-30 | End: 2017-10-31

## 2017-10-30 RX ORDER — POTASSIUM CHLORIDE 20 MEQ
40 PACKET (EA) ORAL ONCE
Qty: 0 | Refills: 0 | Status: COMPLETED | OUTPATIENT
Start: 2017-10-30 | End: 2017-10-30

## 2017-10-30 RX ORDER — GLUCAGON INJECTION, SOLUTION 0.5 MG/.1ML
1 INJECTION, SOLUTION SUBCUTANEOUS ONCE
Qty: 0 | Refills: 0 | Status: DISCONTINUED | OUTPATIENT
Start: 2017-10-30 | End: 2017-10-31

## 2017-10-30 RX ORDER — SODIUM CHLORIDE 9 MG/ML
1000 INJECTION, SOLUTION INTRAVENOUS
Qty: 0 | Refills: 0 | Status: DISCONTINUED | OUTPATIENT
Start: 2017-10-30 | End: 2017-10-31

## 2017-10-30 RX ORDER — MAGNESIUM OXIDE 400 MG ORAL TABLET 241.3 MG
400 TABLET ORAL
Qty: 0 | Refills: 0 | Status: COMPLETED | OUTPATIENT
Start: 2017-10-30 | End: 2017-10-30

## 2017-10-30 RX ORDER — DEXTROSE 50 % IN WATER 50 %
1 SYRINGE (ML) INTRAVENOUS ONCE
Qty: 0 | Refills: 0 | Status: DISCONTINUED | OUTPATIENT
Start: 2017-10-30 | End: 2017-10-31

## 2017-10-30 RX ADMIN — HEPARIN SODIUM 5000 UNIT(S): 5000 INJECTION INTRAVENOUS; SUBCUTANEOUS at 17:34

## 2017-10-30 RX ADMIN — MAGNESIUM OXIDE 400 MG ORAL TABLET 400 MILLIGRAM(S): 241.3 TABLET ORAL at 17:34

## 2017-10-30 RX ADMIN — Medication 20 MILLIGRAM(S): at 06:42

## 2017-10-30 RX ADMIN — MAGNESIUM OXIDE 400 MG ORAL TABLET 400 MILLIGRAM(S): 241.3 TABLET ORAL at 11:22

## 2017-10-30 RX ADMIN — Medication 40 MILLIEQUIVALENT(S): at 11:22

## 2017-10-30 RX ADMIN — Medication: at 13:53

## 2017-10-30 RX ADMIN — Medication 81 MILLIGRAM(S): at 11:22

## 2017-10-30 RX ADMIN — Medication 0.12 MILLIGRAM(S): at 06:42

## 2017-10-30 RX ADMIN — HEPARIN SODIUM 5000 UNIT(S): 5000 INJECTION INTRAVENOUS; SUBCUTANEOUS at 06:42

## 2017-10-30 RX ADMIN — Medication 100 MILLIGRAM(S): at 06:42

## 2017-10-30 NOTE — CONSULT NOTE ADULT - ASSESSMENT
89 yr F with PMH of elevated blood glucose admitted for CHF exacerbation and found to have newly diagnosed DM2 with A1C 8.1.

## 2017-10-30 NOTE — CONSULT NOTE ADULT - SUBJECTIVE AND OBJECTIVE BOX
HPI:  89 y/o F with PMH of elevated blood glucose, HTN, CHF admitted for acute on chronic CHF exacerbation. Patient has known of elevated glucose levels for the past several years. She was never started on diabetes medications and was just recommended diet control. Found to have A1C of 8.1 and diagnosed with DM2 on this admission.  Patient has a diet that is high in "junk food" such as [employer], Burger [employer]. She consumes a lot of bread and pasta. Also drinks a lot of soda and fruit juice. Has nephropathy but no retinopathy or neuropathy. No macrovascular complications. Denies weight loss, abdominal pain or night sweats. No jaundice or change in stool. No FH of pancreatic malignancy.       PAST MEDICAL & SURGICAL HISTORY:  HTN (hypertension)  CHF (congestive heart failure)  History of appendectomy      FAMILY HISTORY:  Family history of throat cancer (Sibling)  T2DM: son      Social History: nonsmoker, nondrinker    Outpatient Medications:  · 	hydroCHLOROthiazide 50 mg oral tablet: 1 tab(s) orally once a day  · 	Metoprolol Succinate  mg oral tablet, extended release: 1 tab(s) orally once a day  · 	digoxin 125 mcg (0.125 mg) oral tablet: 1 tab(s) orally once a day  · 	Aspirin Enteric Coated 81 mg oral delayed release tablet: 1 tab(s) orally once a day      MEDICATIONS  (STANDING):  aspirin enteric coated 81 milliGRAM(s) Oral daily  dextrose 5%. 1000 milliLiter(s) (50 mL/Hr) IV Continuous <Continuous>  dextrose 50% Injectable 12.5 Gram(s) IV Push once  dextrose 50% Injectable 25 Gram(s) IV Push once  dextrose 50% Injectable 25 Gram(s) IV Push once  digoxin     Tablet 0.125 milliGRAM(s) Oral daily  furosemide   Injectable 20 milliGRAM(s) IV Push daily  heparin  Injectable 5000 Unit(s) SubCutaneous every 12 hours  insulin lispro (HumaLOG) corrective regimen sliding scale   SubCutaneous three times a day before meals  insulin lispro (HumaLOG) corrective regimen sliding scale   SubCutaneous at bedtime  magnesium oxide 400 milliGRAM(s) Oral two times a day with meals  metoprolol succinate  milliGRAM(s) Oral daily    MEDICATIONS  (PRN):  dextrose Gel 1 Dose(s) Oral once PRN Blood Glucose LESS THAN 70 milliGRAM(s)/deciliter  docusate sodium 100 milliGRAM(s) Oral daily PRN Constipation  glucagon  Injectable 1 milliGRAM(s) IntraMuscular once PRN Glucose LESS THAN 70 milligrams/deciliter  senna 2 Tablet(s) Oral at bedtime PRN Constipation      Allergies    No Known Allergies    Intolerances      Review of Systems:  Constitutional: No fever  Eyes: No blurry vision  Neuro: No tremors  HEENT: No pain  Cardiovascular: No chest pain, palpitations  Respiratory: + SOB, no cough  GI: No nausea, vomiting, abdominal pain  : No dysuria  Skin: no rash  Psych: no depression  Endocrine: no polyuria, polydipsia  Hem/lymph: + swelling  Osteoporosis: no fractures    ALL OTHER SYSTEMS REVIEWED AND NEGATIVE        PHYSICAL EXAM:  VITALS: T(C): 36.3 (10-30-17 @ 13:41)  T(F): 97.3 (10-30-17 @ 13:41), Max: 98.1 (10-30-17 @ 05:35)  HR: 68 (10-30-17 @ 13:41) (68 - 68)  BP: 140/74 (10-30-17 @ 13:41) (140/74 - 147/68)  RR:  (18 - 18)  SpO2:  (97% - 98%)  Wt(kg): --  GENERAL: NAD, well-groomed, well-developed  EYES: No proptosis, no lid lag, anicteric  HEENT:  Atraumatic, Normocephalic, moist mucous membranes  THYROID: Normal size, no palpable nodules  RESPIRATORY: Clear to auscultation bilaterally  CARDIOVASCULAR: Regular rate and rhythm; No murmurs; + peripheral edema  GI: Soft, nontender,  normal bowel sounds  NEURO: sensation intact  PSYCH: Alert and oriented x 3, normal affect, normal mood    POCT Blood Glucose.: 136 mg/dL (10-30-17 @ 16:43)  POCT Blood Glucose.: 167 mg/dL (10-30-17 @ 13:12)  POCT Blood Glucose.: 179 mg/dL (10-30-17 @ 08:34)  POCT Blood Glucose.: 176 mg/dL (10-29-17 @ 21:26)  POCT Blood Glucose.: 128 mg/dL (10-29-17 @ 16:56)  POCT Blood Glucose.: 202 mg/dL (10-28-17 @ 22:25)                            15.6   20.37 )-----------( 238      ( 30 Oct 2017 05:35 )             47.3       10-30    138  |  95<L>  |  23  ----------------------------<  157<H>  3.5   |  27  |  0.96    EGFR if : 59  EGFR if non : 51    Ca    9.8      10-30  Mg     1.8     10-30    TPro  7.1  /  Alb  3.7  /  TBili  0.3  /  DBili  x   /  AST  14  /  ALT  10  /  AlkPhos  130<H>  10-28      Thyroid Function Tests:  10-29 @ 05:15 TSH 2.32 FreeT4 -- T3 -- Anti TPO -- Anti Thyroglobulin Ab -- TSI --      Hemoglobin A1C, Whole Blood: 8.1 % <H> [4.0 - 5.6] (10-29-17 @ 05:15)      10-29 Chol 184  HDL 38<L> Trig 170<H>

## 2017-10-30 NOTE — PROGRESS NOTE ADULT - PROBLEM SELECTOR PLAN 1
Patient admits to eating excess salt especially potato chips and fast food  denies noncompliance with medications  Feels better since admission  f/u ECHO  c/w lasix IV 20mg and monitor Is and Os  f/u third set of Nesotr, first 2 sets negative

## 2017-10-30 NOTE — PROGRESS NOTE ADULT - PROBLEM SELECTOR PLAN 3
A1C 8.1, patient has new diagnosis of diabetes  Informed patient of new diagnosis. Explained to patient that will likely need diet control and not insulin   Patient very upset  will consult diabetic specialist  FS checks TID and at bedtime

## 2017-10-30 NOTE — CONSULT NOTE ADULT - PROBLEM SELECTOR RECOMMENDATION 9
While in hospital can keep on low dose humalog correctional scale. On discharge would recommend dietary modification with reduction of carbs and decreased intake of soda and fruit juice. A1C goal 7.5-8. f/U with PMD as outpatient. Doubt pancreatic lesion as patient has no other signs/symptoms suggestive of malignancy except leukocytosis which is chronic. While in hospital can keep on low dose humalog correctional scale. On discharge would recommend dietary modification with reduction of carbs and decreased intake of soda and fruit juice. A1C goal 7.5-8. F/U with PMD as outpatient.

## 2017-10-30 NOTE — CONSULT NOTE ADULT - SUBJECTIVE AND OBJECTIVE BOX
HPI:  89 yo F, walks with a walker, hx HTN, CHF presented with the complaint of SOB and GARDUNO and currently admitted for CHF exacerbation. She had been having intermittent chronic baseline SOB for the past few months but the past few days the SOB worsened. Patient has noticed that the SOB is worse when she is lying down, and normally she sleeps on a recliner. Patient also endorsed bilateral LE swelling(which she is unsure how long she had for) and dry cough for the past few weeks. States she has had abdominal distension but unable to quantify for how long. States that she hasn't been started on any new medications recently, no rashes, hasn't ever traveled outside of the [place]. No history of asthma. No recent weight loss.       PAST MEDICAL & SURGICAL HISTORY:  HTN (hypertension)  CHF (congestive heart failure)  History of appendectomy    Allergies    No Known Allergies    Intolerances    MEDICATIONS  (STANDING):  aspirin enteric coated 81 milliGRAM(s) Oral daily  dextrose 5%. 1000 milliLiter(s) (50 mL/Hr) IV Continuous <Continuous>  dextrose 50% Injectable 12.5 Gram(s) IV Push once  dextrose 50% Injectable 25 Gram(s) IV Push once  dextrose 50% Injectable 25 Gram(s) IV Push once  digoxin     Tablet 0.125 milliGRAM(s) Oral daily  furosemide   Injectable 20 milliGRAM(s) IV Push daily  heparin  Injectable 5000 Unit(s) SubCutaneous every 12 hours  insulin lispro (HumaLOG) corrective regimen sliding scale   SubCutaneous three times a day before meals  insulin lispro (HumaLOG) corrective regimen sliding scale   SubCutaneous at bedtime  magnesium oxide 400 milliGRAM(s) Oral two times a day with meals  metoprolol succinate  milliGRAM(s) Oral daily    MEDICATIONS  (PRN):  dextrose Gel 1 Dose(s) Oral once PRN Blood Glucose LESS THAN 70 milliGRAM(s)/deciliter  docusate sodium 100 milliGRAM(s) Oral daily PRN Constipation  glucagon  Injectable 1 milliGRAM(s) IntraMuscular once PRN Glucose LESS THAN 70 milligrams/deciliter  senna 2 Tablet(s) Oral at bedtime PRN Constipation      FAMILY HISTORY:  Family history of throat cancer (Sibling)    SOCIAL HISTORY: No EtOH, no tobacco    REVIEW OF SYSTEMS:    CONSTITUTIONAL: no fevers/chills, no weight loss  RESPIRATORY: + dry cough and shortness of breath  CARDIOVASCULAR: No chest pain or palpitations  GASTROINTESTINAL: + abdominal distension, no abdominal pain, no nausea/vomiting/diarrhea  GENITOURINARY: No dysuria, frequency or hematuria  NEUROLOGICAL: No numbness or weakness  SKIN: chronic skin changes on lower extremities, otherwise no rashes  All other review of systems is negative unless indicated above.    VITALS:   T(F): 97.3 (10-30-17 @ 13:41), Max: 98.1 (10-30-17 @ 05:35)  HR: 68 (10-30-17 @ 13:41)  BP: 140/74 (10-30-17 @ 13:41)  RR: 18 (10-30-17 @ 13:41)  SpO2: 98% (10-30-17 @ 13:41)  Wt(kg): --    PHYSICAL EXAM    GENERAL: elderly pleasant woman resting in bed comfortably, hard of hearing  EYES: EOMI, conjunctiva and sclera clear  NECK: Supple, No JVD  CHEST/LUNG: Clear to auscultation bilaterally  HEART: Regular rate and rhythm; distant heart sounds but no murmurs appreciated  ABDOMEN: + BS, distended but soft, non-tender   EXTREMITIES: chronic venous stasis skin changes, dry skin, trace edema  NEUROLOGY: AAO x 3, no focal deficits    LABS:                         15.6   20.37 )-----------( 238      ( 30 Oct 2017 05:35 )             47.3   Auto Neutrophil # : 7.92 K/uL  Auto Lymphocyte # : 5.39 K/uL  Auto Monocyte # : 1.15 K/uL  Auto Eosinophil # : 5.33 K/uL  Auto Basophil # : 0.13 K/uL  Auto Neutrophil % : 39.6 %  Auto Lymphocyte % : 26.9 %  Auto Monocyte % : 5.7 %  Auto Eosinophil % : 26.6 %  Auto Basophil % : 0.6 %    10-30    138  |  95<L>  |  23  ----------------------------<  157<H>  3.5   |  27  |  0.96    Ca    9.8      30 Oct 2017 05:35  Mg     1.8     10-30    Magnesium, Serum: 1.8 mg/dL (10-30 @ 05:35)    IMAGING:  - Xray Chest 1 View AP- PORTABLE-Urgent (10.28.17 @ 16:58) >  There is no focal consolidation. There are no pleural effusions. No evidence of pneumothorax. Bony structures are intact. The cardiac silhouette is not enlarged.

## 2017-10-30 NOTE — CONSULT NOTE ADULT - ATTENDING COMMENTS
This 89 year old female has no history of foreign travel ; she has a history of congestive heart failure. The patient has no history of paracytic infection and prior CBC has been negative for eosinophil elevation. The patient physical examination is remarkable for chronic lower extremity signs of venous disease and decreased arterial pulsation. There is no S 4 or S 3 gallop. I doubt hyper eosinophil syndrome as a cause of CHF; this findings may reflect medication allergy or other allergic phenomena. Peripheral blood flow cytometry may be helpful with cytogenetics for abnormality in chromosome 1
89F newly diagnosed DM2 HbA1c 8.1%. Recommend lifestyle modification for dc. RD consult. Inpatient only can continue low Humalog scale. Will sign off please call if any questions.

## 2017-10-30 NOTE — CONSULT NOTE ADULT - PROBLEM SELECTOR RECOMMENDATION 9
Pt had leukocytosis present in 2015 and has been persistent during this admission. New eosinophilia during this admission as well. Had normal eosinophil count in 2015 and in June 2017, today eosinophil count of 5330. Differential diagnosis is broad, including medication induced, allergic reaction, reactive process, parasitic infection, primary hypereosinophilic syndrome. Pt has no constitutional symptoms of fevers, weight loss, rash, or myalgias, has never been out of the country. HCTZ is associated with DRESS and eosinophilia is part of the syndrome, however, no systemic symptoms to support dx of DRESS.   - continue to trend daily CBC with diff  - check strongyloides Ab, stool Ova and Parasites  - send peripheral blood flow cytometry  - will review peripheral smear

## 2017-10-31 VITALS
DIASTOLIC BLOOD PRESSURE: 87 MMHG | OXYGEN SATURATION: 98 % | RESPIRATION RATE: 18 BRPM | HEART RATE: 66 BPM | TEMPERATURE: 99 F | SYSTOLIC BLOOD PRESSURE: 143 MMHG

## 2017-10-31 DIAGNOSIS — E87.6 HYPOKALEMIA: ICD-10-CM

## 2017-10-31 LAB
BASOPHILS # BLD AUTO: 0.13 K/UL — SIGNIFICANT CHANGE UP (ref 0–0.2)
BASOPHILS NFR BLD AUTO: 0.9 % — SIGNIFICANT CHANGE UP (ref 0–2)
BUN SERPL-MCNC: 32 MG/DL — HIGH (ref 7–23)
CALCIUM SERPL-MCNC: 9.3 MG/DL — SIGNIFICANT CHANGE UP (ref 8.4–10.5)
CHLORIDE SERPL-SCNC: 96 MMOL/L — LOW (ref 98–107)
CO2 SERPL-SCNC: 26 MMOL/L — SIGNIFICANT CHANGE UP (ref 22–31)
CREAT SERPL-MCNC: 1.01 MG/DL — SIGNIFICANT CHANGE UP (ref 0.5–1.3)
EOSINOPHIL # BLD AUTO: 2.39 K/UL — HIGH (ref 0–0.5)
EOSINOPHIL NFR BLD AUTO: 16.6 % — HIGH (ref 0–6)
GLUCOSE SERPL-MCNC: 177 MG/DL — HIGH (ref 70–99)
HCT VFR BLD CALC: 47.1 % — HIGH (ref 34.5–45)
HCT VFR BLD CALC: 47.1 % — HIGH (ref 34.5–45)
HGB BLD-MCNC: 14.9 G/DL — SIGNIFICANT CHANGE UP (ref 11.5–15.5)
HGB BLD-MCNC: 14.9 G/DL — SIGNIFICANT CHANGE UP (ref 11.5–15.5)
IMM GRANULOCYTES # BLD AUTO: 0.09 # — SIGNIFICANT CHANGE UP
IMM GRANULOCYTES NFR BLD AUTO: 0.6 % — SIGNIFICANT CHANGE UP (ref 0–1.5)
LYMPHOCYTES # BLD AUTO: 23.3 % — SIGNIFICANT CHANGE UP (ref 13–44)
LYMPHOCYTES # BLD AUTO: 3.35 K/UL — HIGH (ref 1–3.3)
MAGNESIUM SERPL-MCNC: 1.8 MG/DL — SIGNIFICANT CHANGE UP (ref 1.6–2.6)
MCHC RBC-ENTMCNC: 29.1 PG — SIGNIFICANT CHANGE UP (ref 27–34)
MCHC RBC-ENTMCNC: 29.1 PG — SIGNIFICANT CHANGE UP (ref 27–34)
MCHC RBC-ENTMCNC: 31.6 % — LOW (ref 32–36)
MCHC RBC-ENTMCNC: 31.6 % — LOW (ref 32–36)
MCV RBC AUTO: 92 FL — SIGNIFICANT CHANGE UP (ref 80–100)
MCV RBC AUTO: 92 FL — SIGNIFICANT CHANGE UP (ref 80–100)
MONOCYTES # BLD AUTO: 0.96 K/UL — HIGH (ref 0–0.9)
MONOCYTES NFR BLD AUTO: 6.7 % — SIGNIFICANT CHANGE UP (ref 2–14)
NEUTROPHILS # BLD AUTO: 7.47 K/UL — HIGH (ref 1.8–7.4)
NEUTROPHILS NFR BLD AUTO: 51.9 % — SIGNIFICANT CHANGE UP (ref 43–77)
NRBC # FLD: 0 — SIGNIFICANT CHANGE UP
NRBC # FLD: 0 — SIGNIFICANT CHANGE UP
PLATELET # BLD AUTO: 241 K/UL — SIGNIFICANT CHANGE UP (ref 150–400)
PLATELET # BLD AUTO: 241 K/UL — SIGNIFICANT CHANGE UP (ref 150–400)
PMV BLD: 9.5 FL — SIGNIFICANT CHANGE UP (ref 7–13)
PMV BLD: 9.5 FL — SIGNIFICANT CHANGE UP (ref 7–13)
POTASSIUM SERPL-MCNC: 3.4 MMOL/L — LOW (ref 3.5–5.3)
POTASSIUM SERPL-SCNC: 3.4 MMOL/L — LOW (ref 3.5–5.3)
RBC # BLD: 5.12 M/UL — SIGNIFICANT CHANGE UP (ref 3.8–5.2)
RBC # BLD: 5.12 M/UL — SIGNIFICANT CHANGE UP (ref 3.8–5.2)
RBC # FLD: 12.5 % — SIGNIFICANT CHANGE UP (ref 10.3–14.5)
RBC # FLD: 12.5 % — SIGNIFICANT CHANGE UP (ref 10.3–14.5)
SODIUM SERPL-SCNC: 138 MMOL/L — SIGNIFICANT CHANGE UP (ref 135–145)
WBC # BLD: 13.98 K/UL — HIGH (ref 3.8–10.5)
WBC # BLD: 13.98 K/UL — HIGH (ref 3.8–10.5)
WBC # FLD AUTO: 13.98 K/UL — HIGH (ref 3.8–10.5)
WBC # FLD AUTO: 13.98 K/UL — HIGH (ref 3.8–10.5)

## 2017-10-31 PROCEDURE — 99239 HOSP IP/OBS DSCHRG MGMT >30: CPT

## 2017-10-31 RX ORDER — POTASSIUM CHLORIDE 20 MEQ
20 PACKET (EA) ORAL ONCE
Qty: 0 | Refills: 0 | Status: DISCONTINUED | OUTPATIENT
Start: 2017-10-31 | End: 2017-10-31

## 2017-10-31 RX ADMIN — Medication: at 09:34

## 2017-10-31 RX ADMIN — Medication: at 12:44

## 2017-10-31 RX ADMIN — Medication 20 MILLIGRAM(S): at 05:25

## 2017-10-31 RX ADMIN — Medication 100 MILLIGRAM(S): at 05:24

## 2017-10-31 RX ADMIN — Medication 81 MILLIGRAM(S): at 12:44

## 2017-10-31 RX ADMIN — Medication 0.12 MILLIGRAM(S): at 05:24

## 2017-10-31 RX ADMIN — HEPARIN SODIUM 5000 UNIT(S): 5000 INJECTION INTRAVENOUS; SUBCUTANEOUS at 05:25

## 2017-10-31 NOTE — PHYSICAL THERAPY INITIAL EVALUATION ADULT - GENERAL OBSERVATIONS, REHAB EVAL
Consult received, chart reviewed. Patient received supine in bed, NAD, +halter. Patient agreed to EVALUATION from Physical Therapist.

## 2017-10-31 NOTE — PHYSICAL THERAPY INITIAL EVALUATION ADULT - LIVES WITH, PROFILE
in first floor of private house with 3-4 steps to enter with handrail- daughter lives upstairs and helps patient with ADLs/alone

## 2017-10-31 NOTE — DISCHARGE NOTE ADULT - SECONDARY DIAGNOSIS.
Eosinophilia Type 2 diabetes mellitus without complication, without long-term current use of insulin Essential hypertension

## 2017-10-31 NOTE — PROGRESS NOTE ADULT - ASSESSMENT
89 yo F HTN, CHF, admitted with SOB likely due to CHF exacerbation.
89 yo F HTN, CHF, admitted with SOB likely due to CHF exacerbation.
89 yo F HTN, CHF, admitted with SOB likely due to CHF exacerbation.

## 2017-10-31 NOTE — DISCHARGE NOTE ADULT - HOSPITAL COURSE
89 y/o F who walks with a walker with a PMH of HTN, CHF presented with the complaint of SOB and GARDUNO. As per the patient she has been having intermittent chronic baseline SOB for the past few months but the past few days the SOB worsened. Patient has noticed that the SOB is worse when she is lying down, and normally she sleeps on a recliner. Patient stated that walking from her bedroom to the bathroom which is 50 feet she would feel very winded and SOB, but when she rest the SOB resolves. Patient also endorsed bilateral LE swelling(which she is unsure how long she had for), orthopnea and PND. Patient stated that she is compliant with her heart medications, but eats "a lot of junk food like [employer], [employer] and potato chips". Patient also endorsed dry cough for the past few weeks. Patient also endorsed chronic constipation, but stated that she had "small amount of bowel movement yesterday". Patient did endorse abdominal distention, but stated that "she has had that for the longest time". Patient denied any CP, fevers, chills, N/V/D, abdominal pain, melena, hematochezia, recent travel, sick contact, dysuria, pleuritic or positional chest pain. Patient did endorse choking to aspirin a month ago, but stated that she has no problem with swallowing food or water.    On ED admission EKG revealed NSR with sinus arrhythmia, LVH at a rate of 60 with Qtc of 356 and TWI in lead I, AVL, V2-V5, CE x 1: Negative, WBC: 19.18, Gluc: 186, Alk Phos: 130, BNP: 838.5. Prelim CXR: clear lungs. Patient received 1x dose of Lasix 20mg IVP, Duonebs and IV Magnesium in the ED. When examined patient is resting in the stretcher and stated that "her breathing is improved, and better that when she came in.  EKG: NSR with sinus arrhythmia, LVH at a rate of 60 with Qtc of 356 and TWI in lead I, AVL, V2-V5  CE x 2: Negative   WBC: 19.18  Gluc: 186  Alk Phos: 130  BNP: 838.5  Prelim CXR: clear lungs.     10/29 Med: SOB 2/2 to CHF exacerbation --> echo, c/w IV lasix 20 qd, CE, Since 2015, pt has had leukocytosis, however eosinophlia is new, Eosinophilia 2950, patient has erythema in bilateral lower extremities, pt  interested in going home, Repeat Diff in the AM, if eosinophilia stable can follow up outpatient for eosinophilia workup. WBC: 18.18 likely reactive process. A1C 8.1, pt has new diagnosis of diabetes, Informed patient of new diagnosis - likely need diet control and not insulin-  Patient very upset, c/w other home meds    10/30 Med:  CHF- likely 2/2  noncompliance with diet and  medications,  ECHO, c/w lasix IV 20mg and monitor Is and Os, f/u third set of Nestor, first 2 sets negative. Leukocytosis.  Since 2015, patient has had leukocytosis, however eosinophlia is new now rising will get heme consult.   Hyperglycemia,: A1C 8.1, patient has new diagnosis of diabetes Informed patient of new diagnosis. Explained to patient that will likely need diet control and not insulin Patient very upset will consult diabetic specialist, FS checks TID and at bedtime. HTN  c/w antihypertensive medications,     10/30 Heme: Eosinophilia. Pt had leukocytosis present in 2015 and has been persistent during this admission. New eosinophilia during this admission as well. Had normal eosinophil count in 2015 and in June 2017, today eosinophil count of 5330. Differential diagnosis is broad, including medication induced, allergic reaction, reactive process, parasitic infection, primary hypereosinophilic syndrome. Pt has no constitutional symptoms of fevers, weight loss, rash, or myalgias, has never been out of the country. HCTZ is associated with DRESS and eosinophilia is part of the syndrome, however, no systemic symptoms to support dx of DRESS.   - continue to trend daily CBC with diff - check strongyloides Ab, stool Ova and Parasites  - send peripheral blood flow cytometry - will review peripheral smear.  10/30 UA: neg   10/30 ENDO: newly diagnosed DM2 HbA1c 8.1%. Recommend lifestyle modification for dc. dietician cs. Inpatient only can continue low Humalog scale.   10/31 Patient and daughter anxious to get patient home today, D/w Dr. Ly outpatient PCP agreed to continue further workup as outpatient for eosinophilia and will get outpatient echo. Patient to continue current home medications. Patient stable for d/c home today. Daughter refused home care. 89 y/o F who walks with a walker with a PMH of HTN, CHF presented with the complaint of SOB and GARDUNO. As per the patient she has been having intermittent chronic baseline SOB for the past few months but the past few days the SOB worsened. Patient has noticed that the SOB is worse when she is lying down, and normally she sleeps on a recliner. Patient stated that walking from her bedroom to the bathroom which is 50 feet she would feel very winded and SOB, but when she rest the SOB resolves. Patient also endorsed bilateral LE swelling(which she is unsure how long she had for), orthopnea and PND. Patient stated that she is compliant with her heart medications, but eats "a lot of junk food like [employer], [employer] and potato chips". Patient also endorsed dry cough for the past few weeks. Patient also endorsed chronic constipation, but stated that she had "small amount of bowel movement yesterday". Patient did endorse abdominal distention, but stated that "she has had that for the longest time". Patient denied any CP, fevers, chills, N/V/D, abdominal pain, melena, hematochezia, recent travel, sick contact, dysuria, pleuritic or positional chest pain. Patient did endorse choking to aspirin a month ago, but stated that she has no problem with swallowing food or water.    On ED admission EKG revealed NSR with sinus arrhythmia, LVH at a rate of 60 with Qtc of 356 and TWI in lead I, AVL, V2-V5, CE x 1: Negative, WBC: 19.18, Gluc: 186, Alk Phos: 130, BNP: 838.5. Prelim CXR: clear lungs. Patient received 1x dose of Lasix 20mg IVP, Duonebs and IV Magnesium in the ED. When examined patient is resting in the stretcher and stated that "her breathing is improved, and better that when she came in.  EKG: NSR with sinus arrhythmia, LVH at a rate of 60 with Qtc of 356 and TWI in lead I, AVL, V2-V5  CE x 2: Negative   WBC: 19.18  Gluc: 186  Alk Phos: 130  BNP: 838.5  Prelim CXR: clear lungs.     10/29 Med: SOB 2/2 to CHF exacerbation --> echo, c/w IV lasix 20 qd, CE, Since 2015, pt has had leukocytosis, however eosinophlia is new, Eosinophilia 2950, patient has erythema in bilateral lower extremities, pt  interested in going home, Repeat Diff in the AM, if eosinophilia stable can follow up outpatient for eosinophilia workup. WBC: 18.18 likely reactive process. A1C 8.1, pt has new diagnosis of diabetes, Informed patient of new diagnosis - likely need diet control and not insulin-  Patient very upset, c/w other home meds    10/30 Med:  CHF- likely 2/2  noncompliance with diet and  medications,  ECHO, c/w lasix IV 20mg and monitor Is and Os, f/u third set of Nestor, first 2 sets negative. Leukocytosis.  Since 2015, patient has had leukocytosis, however eosinophlia is new now rising will get heme consult.   Hyperglycemia,: A1C 8.1, patient has new diagnosis of diabetes Informed patient of new diagnosis. Explained to patient that will likely need diet control and not insulin Patient very upset will consult diabetic specialist, FS checks TID and at bedtime. HTN  c/w antihypertensive medications,     10/30 Heme: Eosinophilia. Pt had leukocytosis present in 2015 and has been persistent during this admission. New eosinophilia during this admission as well. Had normal eosinophil count in 2015 and in June 2017, today eosinophil count of 5330. Differential diagnosis is broad, including medication induced, allergic reaction, reactive process, parasitic infection, primary hypereosinophilic syndrome. Pt has no constitutional symptoms of fevers, weight loss, rash, or myalgias, has never been out of the country. HCTZ is associated with DRESS and eosinophilia is part of the syndrome, however, no systemic symptoms to support dx of DRESS.   - continue to trend daily CBC with diff - check strongyloides Ab, stool Ova and Parasites  - send peripheral blood flow cytometry - will review peripheral smear.  10/30 UA: neg   10/30 ENDO: newly diagnosed DM2 HbA1c 8.1%. Recommend lifestyle modification for dc. dietician cs. Inpatient only can continue low Humalog scale.   10/31 Patient and daughter anxious to get patient home today, D/w Dr. Ly outpatient PCP agreed to continue further workup as outpatient for eosinophilia and will get outpatient echo. Patient to continue current home medications. Patient stable for d/c home today. Daughter refused home care.  I spoke to the daughter about f/u with heme and pcp about the flow cytometry testing and f/u, daughter agrees with the plan.

## 2017-10-31 NOTE — PHYSICAL THERAPY INITIAL EVALUATION ADULT - CRITERIA FOR SKILLED THERAPEUTIC INTERVENTIONS
predicted duration of therapy intervention/anticipated discharge recommendation/rehab potential/anticipated equipment needs at discharge/impairments found/therapy frequency

## 2017-10-31 NOTE — PROGRESS NOTE ADULT - PROBLEM SELECTOR PLAN 1
Patient admits to eating excess salt especially potato chips and fast food  denies noncompliance with medications  Feels better since admission  f/u ECHO  c/w lasix IV 20mg and monitor Is and Os  will change to home dose hctz discussed with pt's cardiologist and pcp Aliyah Mckeon about the hospitalization, he would f/u pt next week

## 2017-10-31 NOTE — DISCHARGE NOTE ADULT - PATIENT PORTAL LINK FT
“You can access the [employer] Patient Portal, offered by [facility], by registering with the following website: [email or web address]”

## 2017-10-31 NOTE — DISCHARGE NOTE ADULT - MEDICATION SUMMARY - MEDICATIONS TO TAKE
I will START or STAY ON the medications listed below when I get home from the hospital:    Aspirin Enteric Coated 81 mg oral delayed release tablet  -- 1 tab(s) by mouth once a day  -- Indication: For Cardiac protective     digoxin 125 mcg (0.125 mg) oral tablet  -- 1 tab(s) by mouth once a day  -- Indication: For Heart failure    Metoprolol Succinate  mg oral tablet, extended release  -- 1 tab(s) by mouth once a day  -- Indication: For Acute on chronic systolic congestive heart failure    hydroCHLOROthiazide 50 mg oral tablet  -- 1 tab(s) by mouth once a day  -- Indication: For HTN (hypertension) I will START or STAY ON the medications listed below when I get home from the hospital:    Aspirin Enteric Coated 81 mg oral delayed release tablet  -- 1 tab(s) by mouth once a day  -- Indication: For Cardio protective     digoxin 125 mcg (0.125 mg) oral tablet  -- 1 tab(s) by mouth once a day  -- Indication: For Acute on chronic systolic congestive heart failure    Metoprolol Succinate  mg oral tablet, extended release  -- 1 tab(s) by mouth once a day  -- Indication: For Acute on chronic systolic congestive heart failure    hydroCHLOROthiazide 50 mg oral tablet  -- 1 tab(s) by mouth once a day  -- Indication: For Acute on chronic systolic congestive heart failure

## 2017-10-31 NOTE — PROGRESS NOTE ADULT - PROBLEM SELECTOR PROBLEM 1
Chronic congestive heart failure, unspecified congestive heart failure type

## 2017-10-31 NOTE — DISCHARGE NOTE ADULT - PLAN OF CARE
diet compliance and medications compliance low salt diet, 1500ml fluid restriction, daily weights, continue current cardiac medications as prescribed, follow up with Dr. Ly for outpatient echocardiogram within 1 week You will need Hematology follow up as  differential diagnosis is broad, recommend outpatient  flow cytometry and further workup newly diagnosed DM2 HbA1c 8.1%. Recommend lifestyle modification will need to keep a consistent carbohydrate diet, recheck A1C in 3 months low salt diet, continue antihypertensive as prescribed losartan, toprol and hydrochlorothiazide

## 2017-10-31 NOTE — PROGRESS NOTE ADULT - PROBLEM SELECTOR PLAN 3
A1C 8.1, patient has new diagnosis of diabetes  Informed patient of new diagnosis. Explained to patient that will likely need diet control and not insulin   Patient very upset  appreciate endo consult rec diet changes, f/u with endo as outpt  d/c home today, d/c planning time spent in coordination 45 mts

## 2017-10-31 NOTE — PHYSICAL THERAPY INITIAL EVALUATION ADULT - ADDITIONAL COMMENTS
Pt. reports being household ambulator. Pt has grab bars in bathroom.     Pt. left sitting OOB in chair, all lines/devices intact, call bell in reach. GERMAN naylor.

## 2017-10-31 NOTE — DISCHARGE NOTE ADULT - CARE PLAN
Principal Discharge DX:	Acute on chronic systolic congestive heart failure  Goal:	diet compliance and medications compliance  Instructions for follow-up, activity and diet:	low salt diet, 1500ml fluid restriction, daily weights, continue current cardiac medications as prescribed, follow up with Dr. Ly for outpatient echocardiogram within 1 week  Secondary Diagnosis:	Eosinophilia  Instructions for follow-up, activity and diet:	You will need Hematology follow up as  differential diagnosis is broad, recommend outpatient  flow cytometry and further workup  Secondary Diagnosis:	Type 2 diabetes mellitus without complication, without long-term current use of insulin  Instructions for follow-up, activity and diet:	newly diagnosed DM2 HbA1c 8.1%. Recommend lifestyle modification will need to keep a consistent carbohydrate diet, recheck A1C in 3 months  Secondary Diagnosis:	Essential hypertension  Instructions for follow-up, activity and diet:	low salt diet, continue antihypertensive as prescribed losartan, toprol and hydrochlorothiazide

## 2017-10-31 NOTE — PROGRESS NOTE ADULT - SUBJECTIVE AND OBJECTIVE BOX
Patient is a 89y old  Female who presents with a chief complaint of SOB and GARDUNO (31 Oct 2017 12:30)      SUBJECTIVE / OVERNIGHT EVENTS: Pt was seen and examined at 1145am, no overnight events, no c/o sob, chest pain or any other complaints, daughter at bedside.    MEDICATIONS  (STANDING):  aspirin enteric coated 81 milliGRAM(s) Oral daily  dextrose 5%. 1000 milliLiter(s) (50 mL/Hr) IV Continuous <Continuous>  dextrose 50% Injectable 12.5 Gram(s) IV Push once  dextrose 50% Injectable 25 Gram(s) IV Push once  dextrose 50% Injectable 25 Gram(s) IV Push once  digoxin     Tablet 0.125 milliGRAM(s) Oral daily  furosemide   Injectable 20 milliGRAM(s) IV Push daily  heparin  Injectable 5000 Unit(s) SubCutaneous every 12 hours  insulin lispro (HumaLOG) corrective regimen sliding scale   SubCutaneous three times a day before meals  insulin lispro (HumaLOG) corrective regimen sliding scale   SubCutaneous at bedtime  metoprolol succinate  milliGRAM(s) Oral daily    MEDICATIONS  (PRN):  dextrose Gel 1 Dose(s) Oral once PRN Blood Glucose LESS THAN 70 milliGRAM(s)/deciliter  docusate sodium 100 milliGRAM(s) Oral daily PRN Constipation  glucagon  Injectable 1 milliGRAM(s) IntraMuscular once PRN Glucose LESS THAN 70 milligrams/deciliter  senna 2 Tablet(s) Oral at bedtime PRN Constipation      Vital Signs Last 24 Hrs  T(C): 37 (31 Oct 2017 13:26), Max: 37 (31 Oct 2017 13:26)  T(F): 98.6 (31 Oct 2017 13:26), Max: 98.6 (31 Oct 2017 13:26)  HR: 66 (31 Oct 2017 13:26) (66 - 70)  BP: 143/87 (31 Oct 2017 13:26) (133/78 - 160/80)  BP(mean): --  RR: 18 (31 Oct 2017 13:26) (18 - 18)  SpO2: 98% (31 Oct 2017 13:26) (96% - 98%)  CAPILLARY BLOOD GLUCOSE  153 (30 Oct 2017 22:09)      POCT Blood Glucose.: 174 mg/dL (31 Oct 2017 11:41)  POCT Blood Glucose.: 192 mg/dL (31 Oct 2017 09:05)  POCT Blood Glucose.: 136 mg/dL (30 Oct 2017 16:43)    I&O's Summary    30 Oct 2017 07:01  -  31 Oct 2017 07:00  --------------------------------------------------------  IN: 240 mL / OUT: 200 mL / NET: 40 mL        PHYSICAL EXAM:  GENERAL: NAD, well-developed  CHEST/LUNG: Clear to auscultation bilaterally; No wheeze  HEART: Regular rate and rhythm  ABDOMEN: Soft, Nontender, Nondistended  EXTREMITIES:  b/l lower extremity venous stasis changes  PSYCH:calm   NEUROLOGY: alert, awake responsive  SKIN: No rashes or lesions    LABS:                        14.9   13.98 )-----------( 241      ( 31 Oct 2017 07:48 )             47.1     10-31    138  |  96<L>  |  32<H>  ----------------------------<  177<H>  3.4<L>   |  26  |  1.01    Ca    9.3      31 Oct 2017 07:48  Mg     1.8     10-31        CARDIAC MARKERS ( 30 Oct 2017 05:35 )  x     / < 0.06 ng/mL / 177 u/L / 6.33 ng/mL / x          Urinalysis Basic - ( 30 Oct 2017 14:30 )    Color: PLYEL / Appearance: CLEAR / S.011 / pH: 6.5  Gluc: NEGATIVE / Ketone: NEGATIVE  / Bili: NEGATIVE / Urobili: NORMAL E.U.   Blood: NEGATIVE / Protein: 20 / Nitrite: NEGATIVE   Leuk Esterase: NEGATIVE / RBC: 0-2 / WBC 2-5   Sq Epi: OCC / Non Sq Epi: x / Bacteria: x        RADIOLOGY & ADDITIONAL TESTS:    Imaging Personally Reviewed:    Consultant(s) Notes Reviewed:      Care Discussed with Consultants/Other Providers:
Patient is a 89y old  Female who presents with a chief complaint of SOB and GARDUNO (28 Oct 2017 21:46)      SUBJECTIVE / OVERNIGHT EVENTS: Pt was seen and examined at 1135am, No overnight events, reports some sob on exertion but not at rest, no chest pain, no other  complaints.    MEDICATIONS  (STANDING):  aspirin enteric coated 81 milliGRAM(s) Oral daily  dextrose 5%. 1000 milliLiter(s) (50 mL/Hr) IV Continuous <Continuous>  dextrose 50% Injectable 12.5 Gram(s) IV Push once  dextrose 50% Injectable 25 Gram(s) IV Push once  dextrose 50% Injectable 25 Gram(s) IV Push once  digoxin     Tablet 0.125 milliGRAM(s) Oral daily  furosemide   Injectable 20 milliGRAM(s) IV Push daily  heparin  Injectable 5000 Unit(s) SubCutaneous every 12 hours  insulin lispro (HumaLOG) corrective regimen sliding scale   SubCutaneous three times a day before meals  insulin lispro (HumaLOG) corrective regimen sliding scale   SubCutaneous at bedtime  magnesium oxide 400 milliGRAM(s) Oral two times a day with meals  metoprolol succinate  milliGRAM(s) Oral daily    MEDICATIONS  (PRN):  dextrose Gel 1 Dose(s) Oral once PRN Blood Glucose LESS THAN 70 milliGRAM(s)/deciliter  docusate sodium 100 milliGRAM(s) Oral daily PRN Constipation  glucagon  Injectable 1 milliGRAM(s) IntraMuscular once PRN Glucose LESS THAN 70 milligrams/deciliter  senna 2 Tablet(s) Oral at bedtime PRN Constipation      Vital Signs Last 24 Hrs  T(C): 36.3 (30 Oct 2017 13:41), Max: 36.7 (30 Oct 2017 05:35)  T(F): 97.3 (30 Oct 2017 13:41), Max: 98.1 (30 Oct 2017 05:35)  HR: 68 (30 Oct 2017 13:41) (68 - 68)  BP: 140/74 (30 Oct 2017 13:41) (140/74 - 147/68)  BP(mean): --  RR: 18 (30 Oct 2017 13:41) (18 - 18)  SpO2: 98% (30 Oct 2017 13:41) (97% - 98%)  CAPILLARY BLOOD GLUCOSE      POCT Blood Glucose.: 167 mg/dL (30 Oct 2017 13:12)  POCT Blood Glucose.: 179 mg/dL (30 Oct 2017 08:34)  POCT Blood Glucose.: 176 mg/dL (29 Oct 2017 21:26)  POCT Blood Glucose.: 128 mg/dL (29 Oct 2017 16:56)    I&O's Summary      PHYSICAL EXAM:  GENERAL: NAD, well-developed  CHEST/LUNG: Clear to auscultation bilaterally; No wheeze  HEART: Regular rate and rhythm; No murmurs, rubs, or gallops  ABDOMEN: Soft, Nontender, mildly distended  EXTREMITIES:  b/l LE hyperpigemented venous stasis changes  PSYCH: calm  NEUROLOGY: AAOx3  SKIN: No rashes or lesions    LABS:                        15.6   20.37 )-----------( 238      ( 30 Oct 2017 05:35 )             47.3     10-30    138  |  95<L>  |  23  ----------------------------<  157<H>  3.5   |  27  |  0.96    Ca    9.8      30 Oct 2017 05:35  Mg     1.8     10-30    TPro  7.1  /  Alb  3.7  /  TBili  0.3  /  DBili  x   /  AST  14  /  ALT  10  /  AlkPhos  130<H>  10-28      CARDIAC MARKERS ( 30 Oct 2017 05:35 )  x     / < 0.06 ng/mL / 177 u/L / 6.33 ng/mL / x      CARDIAC MARKERS ( 29 Oct 2017 01:55 )  x     / < 0.06 ng/mL / 111 u/L / x     / x      CARDIAC MARKERS ( 28 Oct 2017 16:30 )  x     / < 0.06 ng/mL / 81 u/L / 5.37 ng/mL / x              RADIOLOGY & ADDITIONAL TESTS:    Imaging Personally Reviewed:    Consultant(s) Notes Reviewed:      Care Discussed with Consultants/Other Providers:
Chief Complaint: Patient is a 89y old  Female who presents with a chief complaint of SOB and GARDUNO (28 Oct 2017 21:46)      INTERVAL HPI/OVERNIGHT EVENTS: Normal sinus rhythm on telemetry. No events overnight. Patient SOB feels better.         MEDICATIONS  (STANDING):  aspirin enteric coated 81 milliGRAM(s) Oral daily  digoxin     Tablet 0.125 milliGRAM(s) Oral daily  furosemide   Injectable 20 milliGRAM(s) IV Push daily  heparin  Injectable 5000 Unit(s) SubCutaneous every 12 hours  metoprolol succinate  milliGRAM(s) Oral daily    MEDICATIONS  (PRN):  docusate sodium 100 milliGRAM(s) Oral daily PRN Constipation  senna 2 Tablet(s) Oral at bedtime PRN Constipation      Vital Signs Last 24 Hrs  T(C): 36.6 (29 Oct 2017 12:23), Max: 36.8 (28 Oct 2017 22:24)  T(F): 97.9 (29 Oct 2017 12:23), Max: 98.2 (28 Oct 2017 22:24)  HR: 72 (29 Oct 2017 12:23) (69 - 75)  BP: 140/94 (29 Oct 2017 12:23) (140/94 - 154/86)  BP(mean): --  RR: 18 (29 Oct 2017 12:23) (18 - 20)  SpO2: 93% (29 Oct 2017 12:23) (93% - 100%)      I&O's Detail    28 Oct 2017 07:01  -  29 Oct 2017 07:00  --------------------------------------------------------  IN:  Total IN: 0 mL    OUT:    Voided: 460 mL  Total OUT: 460 mL    Total NET: -460 mL        CAPILLARY BLOOD GLUCOSE      POCT Blood Glucose.: 128 mg/dL (29 Oct 2017 16:56)  POCT Blood Glucose.: 202 mg/dL (28 Oct 2017 22:25)      PHYSICAL EXAM:    GENERAL: NAD  Pulm: CTA b/l  CV: S1&S2+, rrr  ABDOMEN: bs+, soft, nt, nd,   EXTREMITIES:  2+ peripheral pulses, no appreciable edema in b/l LE  Neuro: Awake, alert      LABS:                        13.7   18.29 )-----------( 210      ( 29 Oct 2017 05:15 )             40.9     10-28    137  |  94<L>  |  22  ----------------------------<  186<H>  3.5   |  30  |  1.06    Ca    9.5      28 Oct 2017 16:30    TPro  7.1  /  Alb  3.7  /  TBili  0.3  /  DBili  x   /  AST  14  /  ALT  10  /  AlkPhos  130<H>  10-28    LIVER FUNCTIONS - ( 28 Oct 2017 16:30 )  Alb: 3.7 g/dL / Pro: 7.1 g/dL / ALK PHOS: 130 u/L / ALT: 10 u/L / AST: 14 u/L / GGT: x     / T. Bili 0.3 mg/dL / D. Bili x               ( 29 Oct 2017 01:55 )CARDIAC MARKERS    u/L   CKMB x       CKMB Units x       Troponin T < 0.06 ng/mL  ( 28 Oct 2017 16:30 )CARDIAC MARKERS   CK 81 u/L   CKMB 5.37 ng/mL<H>   CKMB Units x       Troponin T < 0.06 ng/mL        Microbiology:    RADIOLOGY & ADDITIONAL TESTS:     Imaging Personally Reviewed:   CXR negative  Consultant(s) Notes Reviewed:  n/a    Care Discussed with Consultants/Other Providers

## 2017-10-31 NOTE — PROGRESS NOTE ADULT - PROBLEM SELECTOR PLAN 2
Since 2015, patient has had leukocytosis,  heme consult appreciated for eosinophilia who rec flow cytometry, pt wants to go home, can be done as outpt, discussed with daughter who agrees to f/u with her pcp and heme as outpt for further wkup.
Since 2015, patient has had leukocytosis, however eosinophlia is new  now rising will get heme consult
Since 2015, patient has had leukocytosis, however eosinophlia is new  Eosinophilia 2950, patient has erythema in bilateral lower extremities  Patient interested in going home  Repeat Diff in the AM, if eosinophilia stable can follow up outpatient for eosinophilia workup.  WBC: 18.18 likely reactive process. Patient denied any subjective complaints. Vital signs unremarkable   Will monitor for now

## 2017-11-01 LAB
SPECIMEN SOURCE: SIGNIFICANT CHANGE UP
STRONGYLOIDES AB SER-ACNC: NEGATIVE — SIGNIFICANT CHANGE UP

## 2017-11-02 LAB
-  AMIKACIN: SIGNIFICANT CHANGE UP
-  AMPICILLIN/SULBACTAM: SIGNIFICANT CHANGE UP
-  AMPICILLIN: SIGNIFICANT CHANGE UP
-  AZTREONAM: SIGNIFICANT CHANGE UP
-  CEFAZOLIN: SIGNIFICANT CHANGE UP
-  CEFEPIME: SIGNIFICANT CHANGE UP
-  CEFOXITIN: SIGNIFICANT CHANGE UP
-  CEFTAZIDIME: SIGNIFICANT CHANGE UP
-  CEFTRIAXONE: SIGNIFICANT CHANGE UP
-  CIPROFLOXACIN: SIGNIFICANT CHANGE UP
-  ERTAPENEM: SIGNIFICANT CHANGE UP
-  GENTAMICIN: SIGNIFICANT CHANGE UP
-  IMIPENEM: SIGNIFICANT CHANGE UP
-  LEVOFLOXACIN: SIGNIFICANT CHANGE UP
-  MEROPENEM: SIGNIFICANT CHANGE UP
-  NITROFURANTOIN: SIGNIFICANT CHANGE UP
-  PIPERACILLIN/TAZOBACTAM: SIGNIFICANT CHANGE UP
-  TIGECYCLINE: SIGNIFICANT CHANGE UP
-  TOBRAMYCIN: SIGNIFICANT CHANGE UP
-  TRIMETHOPRIM/SULFAMETHOXAZOLE: SIGNIFICANT CHANGE UP
BACTERIA UR CULT: SIGNIFICANT CHANGE UP
METHOD TYPE: SIGNIFICANT CHANGE UP
ORGANISM # SPEC MICROSCOPIC CNT: SIGNIFICANT CHANGE UP
ORGANISM # SPEC MICROSCOPIC CNT: SIGNIFICANT CHANGE UP

## 2018-03-26 ENCOUNTER — EMERGENCY (EMERGENCY)
Facility: HOSPITAL | Age: 83
LOS: 1 days | Discharge: LEFT BEFORE DISCHARGE | End: 2018-03-26
Attending: EMERGENCY MEDICINE | Admitting: EMERGENCY MEDICINE
Payer: MEDICARE

## 2018-03-26 VITALS
RESPIRATION RATE: 22 BRPM | SYSTOLIC BLOOD PRESSURE: 133 MMHG | TEMPERATURE: 98 F | DIASTOLIC BLOOD PRESSURE: 62 MMHG | HEART RATE: 62 BPM | OXYGEN SATURATION: 100 %

## 2018-03-26 VITALS
OXYGEN SATURATION: 99 % | SYSTOLIC BLOOD PRESSURE: 134 MMHG | RESPIRATION RATE: 20 BRPM | TEMPERATURE: 97 F | HEART RATE: 80 BPM | DIASTOLIC BLOOD PRESSURE: 58 MMHG

## 2018-03-26 DIAGNOSIS — Z90.49 ACQUIRED ABSENCE OF OTHER SPECIFIED PARTS OF DIGESTIVE TRACT: Chronic | ICD-10-CM

## 2018-03-26 LAB
ALBUMIN SERPL ELPH-MCNC: 3.5 G/DL — SIGNIFICANT CHANGE UP (ref 3.3–5)
ALP SERPL-CCNC: 107 U/L — SIGNIFICANT CHANGE UP (ref 40–120)
ALT FLD-CCNC: 23 U/L — SIGNIFICANT CHANGE UP (ref 4–33)
AST SERPL-CCNC: 28 U/L — SIGNIFICANT CHANGE UP (ref 4–32)
BASE EXCESS BLDV CALC-SCNC: 4.9 MMOL/L — SIGNIFICANT CHANGE UP
BASOPHILS # BLD AUTO: 0.06 K/UL — SIGNIFICANT CHANGE UP (ref 0–0.2)
BASOPHILS NFR BLD AUTO: 0.4 % — SIGNIFICANT CHANGE UP (ref 0–2)
BILIRUB SERPL-MCNC: 0.2 MG/DL — SIGNIFICANT CHANGE UP (ref 0.2–1.2)
BLOOD GAS VENOUS - CREATININE: 1.05 MG/DL — SIGNIFICANT CHANGE UP (ref 0.5–1.3)
BUN SERPL-MCNC: 28 MG/DL — HIGH (ref 7–23)
CALCIUM SERPL-MCNC: 9.8 MG/DL — SIGNIFICANT CHANGE UP (ref 8.4–10.5)
CHLORIDE BLDV-SCNC: 101 MMOL/L — SIGNIFICANT CHANGE UP (ref 96–108)
CHLORIDE SERPL-SCNC: 98 MMOL/L — SIGNIFICANT CHANGE UP (ref 98–107)
CO2 SERPL-SCNC: 28 MMOL/L — SIGNIFICANT CHANGE UP (ref 22–31)
CREAT SERPL-MCNC: 1.04 MG/DL — SIGNIFICANT CHANGE UP (ref 0.5–1.3)
D DIMER BLD IA.RAPID-MCNC: 741 NG/ML — SIGNIFICANT CHANGE UP
EOSINOPHIL # BLD AUTO: 0.21 K/UL — SIGNIFICANT CHANGE UP (ref 0–0.5)
EOSINOPHIL NFR BLD AUTO: 1.4 % — SIGNIFICANT CHANGE UP (ref 0–6)
GAS PNL BLDV: 138 MMOL/L — SIGNIFICANT CHANGE UP (ref 136–146)
GLUCOSE BLDV-MCNC: 166 — HIGH (ref 70–99)
GLUCOSE SERPL-MCNC: 160 MG/DL — HIGH (ref 70–99)
HCO3 BLDV-SCNC: 27 MMOL/L — SIGNIFICANT CHANGE UP (ref 20–27)
HCT VFR BLD CALC: 43.7 % — SIGNIFICANT CHANGE UP (ref 34.5–45)
HCT VFR BLDV CALC: 44.4 % — SIGNIFICANT CHANGE UP (ref 34.5–45)
HGB BLD-MCNC: 14.1 G/DL — SIGNIFICANT CHANGE UP (ref 11.5–15.5)
HGB BLDV-MCNC: 14.5 G/DL — SIGNIFICANT CHANGE UP (ref 11.5–15.5)
IMM GRANULOCYTES # BLD AUTO: 0.12 # — SIGNIFICANT CHANGE UP
IMM GRANULOCYTES NFR BLD AUTO: 0.8 % — SIGNIFICANT CHANGE UP (ref 0–1.5)
LACTATE BLDV-MCNC: 1.6 MMOL/L — SIGNIFICANT CHANGE UP (ref 0.5–2)
LYMPHOCYTES # BLD AUTO: 1.18 K/UL — SIGNIFICANT CHANGE UP (ref 1–3.3)
LYMPHOCYTES # BLD AUTO: 7.9 % — LOW (ref 13–44)
MCHC RBC-ENTMCNC: 29.3 PG — SIGNIFICANT CHANGE UP (ref 27–34)
MCHC RBC-ENTMCNC: 32.3 % — SIGNIFICANT CHANGE UP (ref 32–36)
MCV RBC AUTO: 90.9 FL — SIGNIFICANT CHANGE UP (ref 80–100)
MONOCYTES # BLD AUTO: 0.96 K/UL — HIGH (ref 0–0.9)
MONOCYTES NFR BLD AUTO: 6.4 % — SIGNIFICANT CHANGE UP (ref 2–14)
NEUTROPHILS # BLD AUTO: 12.38 K/UL — HIGH (ref 1.8–7.4)
NEUTROPHILS NFR BLD AUTO: 83.1 % — HIGH (ref 43–77)
NRBC # FLD: 0 — SIGNIFICANT CHANGE UP
NT-PROBNP SERPL-SCNC: 590.8 PG/ML — SIGNIFICANT CHANGE UP
PCO2 BLDV: 58 MMHG — HIGH (ref 41–51)
PH BLDV: 7.34 PH — SIGNIFICANT CHANGE UP (ref 7.32–7.43)
PLATELET # BLD AUTO: 265 K/UL — SIGNIFICANT CHANGE UP (ref 150–400)
PMV BLD: 9.3 FL — SIGNIFICANT CHANGE UP (ref 7–13)
PO2 BLDV: 41 MMHG — HIGH (ref 35–40)
POTASSIUM BLDV-SCNC: 4 MMOL/L — SIGNIFICANT CHANGE UP (ref 3.4–4.5)
POTASSIUM SERPL-MCNC: 4.1 MMOL/L — SIGNIFICANT CHANGE UP (ref 3.5–5.3)
POTASSIUM SERPL-SCNC: 4.1 MMOL/L — SIGNIFICANT CHANGE UP (ref 3.5–5.3)
PROT SERPL-MCNC: 7.2 G/DL — SIGNIFICANT CHANGE UP (ref 6–8.3)
RBC # BLD: 4.81 M/UL — SIGNIFICANT CHANGE UP (ref 3.8–5.2)
RBC # FLD: 12.9 % — SIGNIFICANT CHANGE UP (ref 10.3–14.5)
SAO2 % BLDV: 67.7 % — SIGNIFICANT CHANGE UP (ref 60–85)
SODIUM SERPL-SCNC: 139 MMOL/L — SIGNIFICANT CHANGE UP (ref 135–145)
WBC # BLD: 14.91 K/UL — HIGH (ref 3.8–10.5)
WBC # FLD AUTO: 14.91 K/UL — HIGH (ref 3.8–10.5)

## 2018-03-26 PROCEDURE — 71275 CT ANGIOGRAPHY CHEST: CPT | Mod: 26

## 2018-03-26 PROCEDURE — 99285 EMERGENCY DEPT VISIT HI MDM: CPT | Mod: GC

## 2018-03-26 PROCEDURE — 71045 X-RAY EXAM CHEST 1 VIEW: CPT | Mod: 26

## 2018-03-26 RX ORDER — ASPIRIN/CALCIUM CARB/MAGNESIUM 324 MG
162 TABLET ORAL ONCE
Qty: 0 | Refills: 0 | Status: COMPLETED | OUTPATIENT
Start: 2018-03-26 | End: 2018-03-26

## 2018-03-26 RX ORDER — IPRATROPIUM/ALBUTEROL SULFATE 18-103MCG
3 AEROSOL WITH ADAPTER (GRAM) INHALATION ONCE
Qty: 0 | Refills: 0 | Status: COMPLETED | OUTPATIENT
Start: 2018-03-26 | End: 2018-03-26

## 2018-03-26 RX ORDER — FUROSEMIDE 40 MG
20 TABLET ORAL ONCE
Qty: 0 | Refills: 0 | Status: COMPLETED | OUTPATIENT
Start: 2018-03-26 | End: 2018-03-26

## 2018-03-26 RX ADMIN — Medication 162 MILLIGRAM(S): at 18:45

## 2018-03-26 RX ADMIN — Medication 3 MILLILITER(S): at 18:45

## 2018-03-26 RX ADMIN — Medication 20 MILLIGRAM(S): at 18:45

## 2018-03-26 NOTE — ED PROVIDER NOTE - CARE PLAN
Principal Discharge DX:	Shortness of breath  Assessment and plan of treatment:	Follow up with your cardiologist within 24 hours.  Return to the ED if you have shortness of breath, chest pain, abdominal pain Principal Discharge DX:	Shortness of breath  Assessment and plan of treatment:	Follow up with your cardiologist within 24 hours.  Return to the ED if you have shortness of breath, chest pain, abdominal pain or any other concerning symptoms.  Take medications as previously prescrivbed

## 2018-03-26 NOTE — ED ADULT NURSE NOTE - OBJECTIVE STATEMENT
Pt A+OX3 c/o sudden onset of SOB since 1300h.  Denies CP/palpitations.  +sophy LE chronic edema.  Labs obtained and sent as ordered.  #20g SL R AC placed.  CM placed.  EKG done.  Family at bedside.

## 2018-03-26 NOTE — ED PROVIDER NOTE - OBJECTIVE STATEMENT
89 yo F PMH of HTN and CHF presenting with acute onset of SOB. Per the pt was feeling well until earlier today when she acutely became sob. Pt notes that it was about 1 hour after eating lunch. Pt denies eating any new foods or having allergies. Pt does not believe she has had any recent wt gain although is not sure. Pt denies any fevers, chills, cough, wheezing, nausea, vomiting abd pain chest pain dysuria. Pt has no history of smoking or lung disease.

## 2018-03-26 NOTE — ED PROVIDER NOTE - PROGRESS NOTE DETAILS
Juan R PGY-2: CT-A negative for PE. Patient family at bedside want to bring her home and follow up with cardiologist tomorrow. Family understands the risks and benefits of discharge without further evaluation. Will sign out AMA.

## 2018-03-26 NOTE — ED PROVIDER NOTE - ATTENDING CONTRIBUTION TO CARE
89F p/w SOB after eating lunch, ate a pastrami and salami sandwich.  No allergies or new foods.  Denies weight gain or leg swelling, does report chronic venous stasis change, which is slightly less than usual.  Still having SOB at present.  Admission in october for same thing, again due to dietary indiscretion.  Given her venous stasis and SOB without crackles or JVD, would pursue PE evaluation - with dimer -> CTA.  Given lasix in ED with (+)diuresis.  CTA negative.  Advised to stay in hospital for further eval, ischemic workup, advised possibility of arrhthmia, MI, death due to CHF, pt and family understand and want to go home feeling like pt would likely worsen in hospital given e.g. uncomfortable ED stretcher, full hospital and unlikelihood of rapid admission and room placement.  They will AMA, understand to RTER if new or worsening complaint, will follow up with her doctor soon.  VS:  tachypnea, transient hypertension    GEN - mild distress SOB, on oxygen; A+O x3   HEAD - NC/AT     ENT - PEERL, EOMI, mucous membranes dry , no discharge      NECK: Neck supple, non-tender without lymphadenopathy, no masses, no JVD  PULM - CTA b/l,  symmetric breath sounds  COR -  normal heart sounds    ABD - , ND, NT, soft, no guarding, no rebound, no masses    BACK - no CVA tenderness, nontender spine     EXTREMS - no edema, no deformity, warm and well perfused  except for bilat venous stasis change - bilat LE bluish discoloration, hyperkeratosis, mild nonpitting edema but skin not tense.   SKIN - no rash or bruising      NEUROLOGIC - alert, CN 2-12 intact, sensation nl, motor 5/5 RUE/LUE/RLE/LLE.

## 2018-03-26 NOTE — ED ADULT NURSE REASSESSMENT NOTE - NS ED NURSE REASSESS COMMENT FT1
Pt. received in spot 26a with c/o sob, Awaiting CTPA, no acute distress noted. family at bedside. will continue to monitor.

## 2018-03-26 NOTE — ED PROVIDER NOTE - MEDICAL DECISION MAKING DETAILS
89 F pmh of chf/htn presenting with acute onset of SOB. bedside u/s shows sm R pleural effusion with scattered b lines thoughout b/l. will get standard labs with vbg. Will get CXR. will give 20mg IV lasix

## 2018-03-26 NOTE — ED PROVIDER NOTE - CPE EDP EYES NORM
Date: 1/17/2022    Patient Name: Tracey Delaney          To Whom it may concern: This letter has been written at the patient's request. The above patient was seen at the [facility] for treatment of a medical condition.     The patient ma
normal...

## 2018-03-26 NOTE — ED ADULT TRIAGE NOTE - CHIEF COMPLAINT QUOTE
Arrives via EMS.  Pt c/o difficulty breathing since this afternoon.  Positive dyspnea noted when pt is speaking.  Pt arrives with O2 2L

## 2018-08-20 ENCOUNTER — EMERGENCY (EMERGENCY)
Facility: HOSPITAL | Age: 83
LOS: 1 days | Discharge: HOME | End: 2018-08-20
Attending: EMERGENCY MEDICINE | Admitting: STUDENT IN AN ORGANIZED HEALTH CARE EDUCATION/TRAINING PROGRAM
Payer: MEDICARE

## 2018-08-20 VITALS
SYSTOLIC BLOOD PRESSURE: 200 MMHG | RESPIRATION RATE: 26 BRPM | HEART RATE: 61 BPM | DIASTOLIC BLOOD PRESSURE: 90 MMHG | OXYGEN SATURATION: 99 %

## 2018-08-20 DIAGNOSIS — Z90.49 ACQUIRED ABSENCE OF OTHER SPECIFIED PARTS OF DIGESTIVE TRACT: Chronic | ICD-10-CM

## 2018-08-20 DIAGNOSIS — I48.0 PAROXYSMAL ATRIAL FIBRILLATION: ICD-10-CM

## 2018-08-20 DIAGNOSIS — I16.0 HYPERTENSIVE URGENCY: ICD-10-CM

## 2018-08-20 DIAGNOSIS — R06.02 SHORTNESS OF BREATH: ICD-10-CM

## 2018-08-20 DIAGNOSIS — I50.33 ACUTE ON CHRONIC DIASTOLIC (CONGESTIVE) HEART FAILURE: ICD-10-CM

## 2018-08-20 DIAGNOSIS — I50.9 HEART FAILURE, UNSPECIFIED: ICD-10-CM

## 2018-08-20 DIAGNOSIS — Z29.9 ENCOUNTER FOR PROPHYLACTIC MEASURES, UNSPECIFIED: ICD-10-CM

## 2018-08-20 DIAGNOSIS — D72.829 ELEVATED WHITE BLOOD CELL COUNT, UNSPECIFIED: ICD-10-CM

## 2018-08-20 DIAGNOSIS — Z71.89 OTHER SPECIFIED COUNSELING: ICD-10-CM

## 2018-08-20 LAB
ALBUMIN SERPL ELPH-MCNC: 4.1 G/DL — SIGNIFICANT CHANGE UP (ref 3.3–5)
ALP SERPL-CCNC: 106 U/L — SIGNIFICANT CHANGE UP (ref 40–120)
ALT FLD-CCNC: 11 U/L — SIGNIFICANT CHANGE UP (ref 10–45)
ANION GAP SERPL CALC-SCNC: 15 MMOL/L — SIGNIFICANT CHANGE UP (ref 5–17)
APTT BLD: 27.5 SEC — SIGNIFICANT CHANGE UP (ref 27.5–37.4)
AST SERPL-CCNC: 20 U/L — SIGNIFICANT CHANGE UP (ref 10–40)
BASOPHILS # BLD AUTO: 0 K/UL — SIGNIFICANT CHANGE UP (ref 0–0.2)
BASOPHILS NFR BLD AUTO: 0.1 % — SIGNIFICANT CHANGE UP (ref 0–2)
BILIRUB SERPL-MCNC: 0.2 MG/DL — SIGNIFICANT CHANGE UP (ref 0.2–1.2)
BUN SERPL-MCNC: 30 MG/DL — HIGH (ref 7–23)
CALCIUM SERPL-MCNC: 10.3 MG/DL — SIGNIFICANT CHANGE UP (ref 8.4–10.5)
CHLORIDE SERPL-SCNC: 99 MMOL/L — SIGNIFICANT CHANGE UP (ref 96–108)
CO2 SERPL-SCNC: 21 MMOL/L — LOW (ref 22–31)
CREAT SERPL-MCNC: 1.02 MG/DL — SIGNIFICANT CHANGE UP (ref 0.5–1.3)
DIGOXIN SERPL-MCNC: 0.9 NG/ML — SIGNIFICANT CHANGE UP (ref 0.8–2)
EOSINOPHIL # BLD AUTO: 0.2 K/UL — SIGNIFICANT CHANGE UP (ref 0–0.5)
EOSINOPHIL NFR BLD AUTO: 1.4 % — SIGNIFICANT CHANGE UP (ref 0–6)
GLUCOSE SERPL-MCNC: 182 MG/DL — HIGH (ref 70–99)
HCT VFR BLD CALC: 42.3 % — SIGNIFICANT CHANGE UP (ref 34.5–45)
HGB BLD-MCNC: 14.6 G/DL — SIGNIFICANT CHANGE UP (ref 11.5–15.5)
INR BLD: 1.11 RATIO — SIGNIFICANT CHANGE UP (ref 0.88–1.16)
LYMPHOCYTES # BLD AUTO: 1.1 K/UL — SIGNIFICANT CHANGE UP (ref 1–3.3)
LYMPHOCYTES # BLD AUTO: 7.2 % — LOW (ref 13–44)
MCHC RBC-ENTMCNC: 30.7 PG — SIGNIFICANT CHANGE UP (ref 27–34)
MCHC RBC-ENTMCNC: 34.6 GM/DL — SIGNIFICANT CHANGE UP (ref 32–36)
MCV RBC AUTO: 88.6 FL — SIGNIFICANT CHANGE UP (ref 80–100)
MONOCYTES # BLD AUTO: 0.8 K/UL — SIGNIFICANT CHANGE UP (ref 0–0.9)
MONOCYTES NFR BLD AUTO: 5.6 % — SIGNIFICANT CHANGE UP (ref 2–14)
NEUTROPHILS # BLD AUTO: 12.9 K/UL — HIGH (ref 1.8–7.4)
NEUTROPHILS NFR BLD AUTO: 85.7 % — HIGH (ref 43–77)
NT-PROBNP SERPL-SCNC: 672 PG/ML — HIGH (ref 0–300)
PLATELET # BLD AUTO: 222 K/UL — SIGNIFICANT CHANGE UP (ref 150–400)
POTASSIUM SERPL-MCNC: 4 MMOL/L — SIGNIFICANT CHANGE UP (ref 3.5–5.3)
POTASSIUM SERPL-SCNC: 4 MMOL/L — SIGNIFICANT CHANGE UP (ref 3.5–5.3)
PROT SERPL-MCNC: 7.5 G/DL — SIGNIFICANT CHANGE UP (ref 6–8.3)
PROTHROM AB SERPL-ACNC: 12 SEC — SIGNIFICANT CHANGE UP (ref 9.8–12.7)
RBC # BLD: 4.77 M/UL — SIGNIFICANT CHANGE UP (ref 3.8–5.2)
RBC # FLD: 12.3 % — SIGNIFICANT CHANGE UP (ref 10.3–14.5)
SODIUM SERPL-SCNC: 135 MMOL/L — SIGNIFICANT CHANGE UP (ref 135–145)
TROPONIN T, HIGH SENSITIVITY RESULT: 23 NG/L — SIGNIFICANT CHANGE UP (ref 0–51)
TROPONIN T, HIGH SENSITIVITY RESULT: 24 NG/L — SIGNIFICANT CHANGE UP (ref 0–51)
WBC # BLD: 15 K/UL — HIGH (ref 3.8–10.5)
WBC # FLD AUTO: 15 K/UL — HIGH (ref 3.8–10.5)

## 2018-08-20 RX ORDER — METOPROLOL TARTRATE 50 MG
100 TABLET ORAL DAILY
Qty: 0 | Refills: 0 | Status: DISCONTINUED | OUTPATIENT
Start: 2018-08-20 | End: 2018-08-21

## 2018-08-20 RX ORDER — HEPARIN SODIUM 5000 [USP'U]/ML
5000 INJECTION INTRAVENOUS; SUBCUTANEOUS EVERY 12 HOURS
Qty: 0 | Refills: 0 | Status: DISCONTINUED | OUTPATIENT
Start: 2018-08-20 | End: 2018-08-21

## 2018-08-20 RX ORDER — DIGOXIN 250 MCG
0.12 TABLET ORAL DAILY
Qty: 0 | Refills: 0 | Status: DISCONTINUED | OUTPATIENT
Start: 2018-08-20 | End: 2018-08-21

## 2018-08-20 RX ORDER — TRIAMTERENE/HYDROCHLOROTHIAZID 75 MG-50MG
2 TABLET ORAL DAILY
Qty: 0 | Refills: 0 | Status: DISCONTINUED | OUTPATIENT
Start: 2018-08-20 | End: 2018-08-21

## 2018-08-20 RX ORDER — FUROSEMIDE 40 MG
40 TABLET ORAL ONCE
Qty: 0 | Refills: 0 | Status: COMPLETED | OUTPATIENT
Start: 2018-08-20 | End: 2018-08-20

## 2018-08-20 RX ADMIN — Medication 40 MILLIGRAM(S): at 05:20

## 2018-08-20 RX ADMIN — Medication 0.12 MILLIGRAM(S): at 11:48

## 2018-08-20 RX ADMIN — Medication 2 CAPSULE(S): at 11:48

## 2018-08-20 RX ADMIN — Medication 100 MILLIGRAM(S): at 11:48

## 2018-08-20 RX ADMIN — HEPARIN SODIUM 5000 UNIT(S): 5000 INJECTION INTRAVENOUS; SUBCUTANEOUS at 18:00

## 2018-08-20 NOTE — CONSULT NOTE ADULT - PROBLEM SELECTOR RECOMMENDATION 9
-  - received lasix 40 mg IVP x 1 dose in ED with improvement in symptoms.  - will increase her daily dose of dyazide to 2 capsules per day for now.  - repeat echo as inpatient vs. outpatient.  - discussed importance of adherence to low-sodium diet.  - plan d/w pt and daughter.  - will follow.    Anthony Mathis M.D., [facility]  [phone] -  - received lasix 40 mg IVP x 1 dose in ED with improvement in symptoms.  - will increase her daily dose of dyazide to 2 capsules per day for now.  - repeat echo as inpatient vs. outpatient.  - discussed importance of adherence to low-sodium diet.

## 2018-08-20 NOTE — ED ADULT NURSE REASSESSMENT NOTE - NS ED NURSE REASSESS COMMENT FT1
Report received from Ysabel DELGADO in [place]. Patient appears to be resting comfortably in stretcher. Patient denies dizziness, n/v/d, chest pain, SOB, numbness, tingling. Per ED MD Rashmi STAPLES, patient okay to take off of bipap and trial on NC at this time. A&OX3. Safety and comfort measures provided. Daughter at bedside.

## 2018-08-20 NOTE — H&P ADULT - NSHPLABSRESULTS_GEN_ALL_CORE
Labs w/ leukocytosis 15, cbc otherwise unremarkable,   BMP notable for BUN/Cr 30/1.02.   Trop 24-->23.   .   CXR clear though w/ cephalization to my eye c/w vascular congestion, EKG w/ TWI in V2-V6, flat TW inferiorly, QTc 416. Labs w/ leukocytosis 15, cbc otherwise unremarkable,   BMP notable for BUN/Cr 30/1.02.   Trop 24-->23.   .   CXR clear though w/ cephalization to my eye c/w vascular congestion, EKG w/ sinus rhythm. TWI in V2-V6, flat TW inferiorly, QTc 416.

## 2018-08-20 NOTE — CONSULT NOTE ADULT - ASSESSMENT
89 F CAD, CVA, HTN, asymmetric LVH, HFpEF previously on dyazide, paroxysmal Afib previously on digoxin who was admitted with shortness of breath. 89 F CAD, CVA, HTN, asymmetric LVH, HFpEF previously on dyazide, paroxysmal Afib previously on digoxin who was admitted with shortness of breath/acute on chronic diastolic HF in setting of high-sodium meal. 89 F CAD, CVA, HTN, asymmetric LVH, HFpEF on dyazide, paroxysmal Afib on digoxin who was admitted with shortness of breath/acute on chronic diastolic HF in setting of high-sodium meal.

## 2018-08-20 NOTE — H&P ADULT - PROBLEM SELECTOR PLAN 6
- daughter d/w me that they have advanced directives and living will stating preference for DNR, OK for trial of intubation. We reviewed explicitly what resuscitation/ACLS was, she's aware and says it's not c/w her mother's wishes.

## 2018-08-20 NOTE — ED ADULT NURSE REASSESSMENT NOTE - NS ED NURSE REASSESS COMMENT FT1
Patient appears to be resting comfortably in stretcher. Patient denies chest pain, dizziness, difficulty breathing, SOB, n/v/d. A&OX3. Daughter at bedside. Safety and comfort measures provided.

## 2018-08-20 NOTE — ED PROVIDER NOTE - NS ED ROS FT
Constitutional: no fever  Eyes: no conjunctivitis  Ears: no ear pain   Nose: no nasal congestion, Mouth/Throat: no throat pain, Neck: no stiffness  Cardiovascular: no chest pain  Chest: no cough +sob   Gastrointestinal: no abdominal pain, no vomiting and diarrhea  MSK: no joint pain  : no dysuria  Skin: no rash  Neuro: no LOC

## 2018-08-20 NOTE — CONSULT NOTE ADULT - SUBJECTIVE AND OBJECTIVE BOX
[facility] Cardiology Consult  _________________________    Patient is a 89y old  Female who presents with a chief complaint of     HPI:  89 F reported h/o CHF and HTN in the past who was admitted with shortness of breath.    PAST MEDICAL & SURGICAL HISTORY:      MEDICATIONS  (STANDING):    MEDICATIONS  (PRN):      Allergies    No Known Allergies    Intolerances        Social Histroy: Tobacco- , ETOH-, Illicit Drugs-    T(C): 36.8 (08-20-18 @ 09:30), Max: 36.8 (08-20-18 @ 07:15)  HR: 68 (08-20-18 @ 09:30) (53 - 68)  BP: 162/62 (08-20-18 @ 09:30) (120/97 - 200/91)  RR: 16 (08-20-18 @ 09:30) (16 - 26)  SpO2: 95% (08-20-18 @ 09:30) (95% - 100%)  I&O's Summary      Review of Systems:  Constitutional: [ ] Fever [ ] Chills [ ] Fatigue [ ] Weight change   HEENT: [ ] Blurred vision [ ] Eye Pain [ ] Headache [ ] Runny nose [ ] Sore Throat   Respiratory: [ ] Cough [ ] Wheezing [ ] Shortness of breath  Cardiovascular: [ ] Chest Pain [ ] Palpitations [ ] GARDUNO [ ] PND [ ] Orthopnea  Gastrointestinal: [ ] Abdominal Pain [ ] Diarrhea [ ] Constipation [ ] Hemorrhoids [ ] Nausea [ ] Vomiting  Genitourinary: [ ] Nocturia [ ] Dysuria [ ] Incontinence  Extremities: [ ] Swelling [ ] Joint Pain  Neurologic: [ ] Focal deficit [ ] Paresthesias [ ] Syncope  Lymphatic: [ ] Swelling [ ] Lymphadenopathy   Skin: [ ] Rash [ ] Ecchymoses [ ] Wounds [ ] Lesions  Psychiatry: [ ] Depression [ ] Suicidal/Homicidal Ideation [ ] Anxiety [ ] Sleep Disturbances  [ ] 10 point review of systems is otherwise negative except as mentioned above            [ ]Unable to obtain    PHYSICAL EXAM:  GENERAL: Alert, NAD  NECK: Supple, No JVD, No carotid bruit.  CHEST/LUNG: Clear to auscultation bilaterally; No wheezes, rales, or rhonchi  HEART: S1 S2 normal, RRR,  No murmurs, rubs, or gallops  ABDOMEN: Soft, Nontender, Nondistended; Bowel sounds present  EXTREMITIES:  No LE edema.      LABS:                        14.6   15.0  )-----------( 222      ( 20 Aug 2018 02:57 )             42.3     08-20    135  |  99  |  30<H>  ----------------------------<  182<H>  4.0   |  21<L>  |  1.02    Ca    10.3      20 Aug 2018 02:57    TPro  7.5  /  Alb  4.1  /  TBili  0.2  /  DBili  x   /  AST  20  /  ALT  11  /  AlkPhos  106  08-20    PT/INR - ( 20 Aug 2018 02:57 )   PT: 12.0 sec;   INR: 1.11 ratio         PTT - ( 20 Aug 2018 02:57 )  PTT:27.5 sec      Serum Pro-Brain Natriuretic Peptide: 672 pg/mL (08-20-18 @ 02:57)          MEDICATIONS  (STANDING):    MEDICATIONS  (PRN):      RADIOLOGY & ADDITIONAL TESTS:    Cardiology testing:  EKG:    Echo:    Stress Testing:    Cath:    Telemetry: [facility] Cardiology Consult  _________________________    CC: shortness of breath.    HPI:  89 F CAD, CVA, HTN, asymmetric LVH, HFpEF previously on dyazide, paroxysmal Afib previously on digoxin who was admitted with shortness of breath. Symptoms started around 1 AM on the night of admission. No association with exertion.      PAST MEDICAL & SURGICAL HISTORY:  CAD  CVA  HTN  assymetric LVH  HFpEF  PAF    MEDICATIONS at Home  Toprol  mg po daily  ASA 81 mg po daily.    MEDICATIONS  (PRN):      Allergies    No Known Allergies    Intolerances        Social Histroy: Tobacco- , ETOH-, Illicit Drugs-    T(C): 36.8 (08-20-18 @ 09:30), Max: 36.8 (08-20-18 @ 07:15)  HR: 68 (08-20-18 @ 09:30) (53 - 68)  BP: 162/62 (08-20-18 @ 09:30) (120/97 - 200/91)  RR: 16 (08-20-18 @ 09:30) (16 - 26)  SpO2: 95% (08-20-18 @ 09:30) (95% - 100%)  I&O's Summary      Review of Systems:  Constitutional: [ ] Fever [ ] Chills [ ] Fatigue [ ] Weight change   HEENT: [ ] Blurred vision [ ] Eye Pain [ ] Headache [ ] Runny nose [ ] Sore Throat   Respiratory: [ ] Cough [ ] Wheezing [x ] Shortness of breath  Cardiovascular: [ ] Chest Pain [ ] Palpitations [ ] GARDUNO [ ] PND [ ] Orthopnea  Gastrointestinal: [ ] Abdominal Pain [ ] Diarrhea [ ] Constipation [ ] Hemorrhoids [ ] Nausea [ ] Vomiting  Genitourinary: [ ] Nocturia [ ] Dysuria [ ] Incontinence  Extremities: [ ] Swelling [ ] Joint Pain  Neurologic: [ ] Focal deficit [ ] Paresthesias [ ] Syncope  Lymphatic: [ ] Swelling [ ] Lymphadenopathy   Skin: [ ] Rash [ ] Ecchymoses [ ] Wounds [ ] Lesions  Psychiatry: [ ] Depression [ ] Suicidal/Homicidal Ideation [ ] Anxiety [ ] Sleep Disturbances  [x] 10 point review of systems is otherwise negative except as mentioned above            [ ]Unable to obtain    PHYSICAL EXAM:  GENERAL: Alert, NAD  NECK: Supple, No JVD, No carotid bruit.  CHEST/LUNG: Clear to auscultation bilaterally; No wheezes, rales, or rhonchi  HEART: S1 S2 normal, RRR,  No murmurs, rubs, or gallops  ABDOMEN: Soft, Nontender, Nondistended; Bowel sounds present  EXTREMITIES:  No LE edema.      LABS:                        14.6   15.0  )-----------( 222      ( 20 Aug 2018 02:57 )             42.3     08-20    135  |  99  |  30<H>  ----------------------------<  182<H>  4.0   |  21<L>  |  1.02    Ca    10.3      20 Aug 2018 02:57    TPro  7.5  /  Alb  4.1  /  TBili  0.2  /  DBili  x   /  AST  20  /  ALT  11  /  AlkPhos  106  08-20    PT/INR - ( 20 Aug 2018 02:57 )   PT: 12.0 sec;   INR: 1.11 ratio         PTT - ( 20 Aug 2018 02:57 )  PTT:27.5 sec      Serum Pro-Brain Natriuretic Peptide: 672 pg/mL (08-20-18 @ 02:57)          MEDICATIONS  (STANDING):    MEDICATIONS  (PRN):      RADIOLOGY & ADDITIONAL TESTS:    Cardiology testing:  EKG: baseline artifact, sinus rhythm, anterolateral TWI. No significant change from previous outpatient EKG.    Echo 11/2017 (): Limited study, pt sitting.  EF 61, Assymetric LVH (septum 1.2 cm, posterior wall 0.9 cm), mild-mod MR, LAE. [facility]  _________________________    CC: shortness of breath.    HPI:  89 F CAD, CVA, HTN, asymmetric LVH, HFpEF on dyazide, paroxysmal Afib on digoxin who was admitted with shortness of breath. Symptoms started around 1 AM on the night of admission. No association with exertion.  + LE edema.  Pt daughter they ate Chinese food for dinner last night (beef and broccoli).    PAST MEDICAL & SURGICAL HISTORY:  CAD  CVA  HTN  assymetric LVH  HFpEF  PAF    MEDICATIONS at Home  Toprol  mg po daily  ASA 81 mg po daily.  Digoxin 0.125 mg po daily  Dyazide    MEDICATIONS  (PRN):      Allergies    No Known Allergies    Intolerances        Social Histroy: Tobacco- , ETOH-, Illicit Drugs-    T(C): 36.8 (08-20-18 @ 09:30), Max: 36.8 (08-20-18 @ 07:15)  HR: 68 (08-20-18 @ 09:30) (53 - 68)  BP: 162/62 (08-20-18 @ 09:30) (120/97 - 200/91)  RR: 16 (08-20-18 @ 09:30) (16 - 26)  SpO2: 95% (08-20-18 @ 09:30) (95% - 100%)  I&O's Summary      Review of Systems:  Constitutional: [ ] Fever [ ] Chills [ ] Fatigue [ ] Weight change   HEENT: [ ] Blurred vision [ ] Eye Pain [ ] Headache [ ] Runny nose [ ] Sore Throat   Respiratory: [ ] Cough [ ] Wheezing [x ] Shortness of breath  Cardiovascular: [ ] Chest Pain [ ] Palpitations [ ] GARDUNO [ ] PND [ ] Orthopnea  Gastrointestinal: [ ] Abdominal Pain [ ] Diarrhea [ ] Constipation [ ] Hemorrhoids [ ] Nausea [ ] Vomiting  Genitourinary: [ ] Nocturia [ ] Dysuria [ ] Incontinence  Extremities: [ z] Swelling [ ] Joint Pain  Neurologic: [ ] Focal deficit [ ] Paresthesias [ ] Syncope  Lymphatic: [ ] Swelling [ ] Lymphadenopathy   Skin: [ ] Rash [ ] Ecchymoses [ ] Wounds [ ] Lesions  Psychiatry: [ ] Depression [ ] Suicidal/Homicidal Ideation [ ] Anxiety [ ] Sleep Disturbances  [x] 10 point review of systems is otherwise negative except as mentioned above            [ ]Unable to obtain    PHYSICAL EXAM:  GENERAL: Alert, NAD  NECK: Supple, No JVD, No carotid bruit.  CHEST/LUNG: slight bibasilar crackles.  HEART: S1 S2 normal, RRR,  soft holosystolic murmur LLSB and apex.  ABDOMEN: Soft, Nontender, Nondistended; Bowel sounds present  EXTREMITIES: stasis changes of LE's. No edema      LABS:                        14.6   15.0  )-----------( 222      ( 20 Aug 2018 02:57 )             42.3     08-20    135  |  99  |  30<H>  ----------------------------<  182<H>  4.0   |  21<L>  |  1.02    Ca    10.3      20 Aug 2018 02:57    TPro  7.5  /  Alb  4.1  /  TBili  0.2  /  DBili  x   /  AST  20  /  ALT  11  /  AlkPhos  106  08-20    PT/INR - ( 20 Aug 2018 02:57 )   PT: 12.0 sec;   INR: 1.11 ratio         PTT - ( 20 Aug 2018 02:57 )  PTT:27.5 sec      Serum Pro-Brain Natriuretic Peptide: 672 pg/mL (08-20-18 @ 02:57)          MEDICATIONS  (STANDING):    MEDICATIONS  (PRN):      RADIOLOGY & ADDITIONAL TESTS:    Cardiology testing:  EKG: baseline artifact, sinus rhythm, anterolateral TWI. No significant change from previous outpatient EKG.    Echo 11/2017 (PH): Limited study, pt sitting.  EF 61, Assymetric LVH (septum 1.2 cm, posterior wall 0.9 cm), mild-mod MR, LAE.

## 2018-08-20 NOTE — ED ADULT NURSE NOTE - OBJECTIVE STATEMENT
89 yo F pmh of CHF, afib came to ED via EMS c/o diff breathing starting tonight 89 yo F pmh of CHF, HTN came to ED via EMS c/o diff breathing starting tonight.  As per daughter, pt had a episode similar to this a few months ago and was seen at [facility], where she received lasix and was discharged home.  Denies any pain, discomfort, lightheadedness, dizziness, changes in urinary or bowel habits, n/v/d, numbness, tingling.  A&Ox4, pt SOB, with bilateral crackles in lower lobes.  Pt has edema noted in lower extremities, +1.  +peripheral pulses bilaterally.  Skin w/d/i.  Placed on cardiac monitor. EKG done at bedside.  Safety and comfort maintained. Will continue to monitor.

## 2018-08-20 NOTE — ED PROVIDER NOTE - MEDICAL DECISION MAKING DETAILS
89 yo female with shortness of breath; likely related to chf exacerbation; low suspicion for pe given patient was not hypoxic nor any risk factors, recently scanned in march with similar presentation and had no pe; will check labs ekg cxr --> re eval 89 yo female with shortness of breath; likely related to chf exacerbation; low suspicion for pe given patient was not hypoxic nor any risk factors, recently scanned in march with similar presentation and had no pe; will check labs ekg cxr --> re paz Cabrera: See attending statement below

## 2018-08-20 NOTE — ED PROVIDER NOTE - ATTENDING CONTRIBUTION TO CARE
89y F hx of CHF, HTN  presenting by EMS from home for acute onset SOB. Pt states that she was in her usual state of health and awoke suddenly from sleep at about midnight feeling extremely SOB. Pt reporst that she routinely sleeps on several pillows. She does endorse BL LE edema but also states that it is not new. Denies any recent weight gain, has been adherent to her diuretics. No cough, fever, chills, CP, abd pain, nv.   Gen: WNWD elderly female in mild resp distress   HEENT: NCAT PERRL EOMI normal pharynx  Neck: supple  CV: RRR, no murmur  Lung: Course w wheezing   Abd: +BS soft NTND  Ext: wwp, palp pulses, FROMx4, bl le pitting edema   Neuro: A&Ox3, CN grossly intact, sensation intact, motor 5/5 throughout  AP: 89y F hx of CHF, HTN  presenting by EMS from home for acute onset SOB. Suspect acute CHF exacerbation. Will check labs, CXR, EKG, CCM. Will start BIPAP to assist w work of breathing. Re-eval.

## 2018-08-20 NOTE — H&P ADULT - PROBLEM SELECTOR PLAN 1
- increase dyazide to 2 pills/day.  - metop 100 qd  - digoxin 125  - dietary education  - echo, in vs. outpatient. Order in for now.   - ekg w/ diffuse TWI, would repeat this afternoon now that she's stabilized- TWI are most likely demand in setting of HTN urgency and acute HF. If still present (and new--no baseline in sunrise), then would discuss further ischemic eval w/ cardiology and family. - increase dyazide to 2 pills/day.  - metop 100 qd  - digoxin 125  - dietary education  - echo, in vs. outpatient. Order in for now.   - ekg w/ diffuse TWI, old from 3/18.

## 2018-08-20 NOTE — H&P ADULT - PROBLEM SELECTOR PLAN 4
- dig, metop  - no AC per family  - per cardiology patient is on ASA though daughter gave me a sheet of meds taken by her mother, ASA not present. Cardiology will d/w family.

## 2018-08-20 NOTE — ED ADULT NURSE NOTE - NSIMPLEMENTINTERV_GEN_ALL_ED
Implemented All Fall Risk Interventions:  [place] to call system. Call bell, personal items and telephone within reach. Instruct patient to call for assistance. Room bathroom lighting operational. Non-slip footwear when patient is off stretcher. Physically safe environment: no spills, clutter or unnecessary equipment. Stretcher in lowest position, wheels locked, appropriate side rails in place. Provide visual cue, wrist band, yellow gown, etc. Monitor gait and stability. Monitor for mental status changes and reorient to person, place, and time. Review medications for side effects contributing to fall risk. Reinforce activity limits and safety measures with patient and family.

## 2018-08-20 NOTE — H&P ADULT - PROBLEM SELECTOR PLAN 2
- resolved, currently asymptomatic.   - begin home BP regimen as above  - escalate diuresis   - trend

## 2018-08-20 NOTE — H&P ADULT - PROBLEM SELECTOR PLAN 3
- stress induced from dHF c/b pulm edema  - trend  - no clinical e/o infection as above at this point, monitor off abx. - stress induced from dHF c/b pulm edema  - trend  - no clinical e/o infection as above at this point, monitor off abx.  - eosinophilia in the past, will check cbc w/ diff tmrw to ensure this is L shift from stress leukocytosis.

## 2018-08-20 NOTE — CONSULT NOTE ADULT - PROBLEM SELECTOR RECOMMENDATION 2
-  - sinus on admission.  - cont home dose of digoxin.  Dig level acceptable.  - on ASA, no AC per primary cardiologist.  - plan d/w pt and daughter.  - will follow.    Anthony Mathis M.D., [facility]  [phone]

## 2018-08-20 NOTE — H&P ADULT - NSHPPHYSICALEXAM_GEN_ALL_CORE
GENERAL: NAD, in good spirits. No glasses, AO x 3. Feeling well. Denies CP or SOB. Ambulatory w/ aide assistance to bathroom.   HEAD:  Atraumatic, Normocephalic  EYES: EOMI, conjunctiva and sclera clear  NECK: No JVD  CHEST/LUNG: Clear to auscultation bilaterally; No crackles or wheeze on my exam.   HEART: Regular rate and rhythm; No murmurs  ABDOMEN: Soft, Nontender, Nondistended; Bowel sounds present  EXTREMITIES:  2+ Peripheral Pulses, WWP w/ 2+ pitting edema.   PSYCH: AAOx3  NEUROLOGY: non-focal  SKIN: b/l LE hemosiderin discoloration. Excoriation of LE as well w/ out evidence of cellulitis.

## 2018-08-20 NOTE — H&P ADULT - ASSESSMENT
Ms. Obrien is a 89 yo woman, independent in ADLs and IADLs w/ home walker + lives independently downstairs from daughter w/ no glasses or hearing aids, w/ hfpef + afib no AC who p/w SOB in setting of eating a pint of chinese food evening prior to admission, found to be in flash pulm edema w/ htn urgency 200/90 requiring IV lasix and BiPAP, rapidly improved. Suspect dietary indiscretion is etiology. Trops are low and stable, no CP or referred pains- clinical presentation not c/w acute MI. Leukocytosis and HTN stress induced, will trend. No e/o infection at this point, no fevers, no cough w/ clear lungs after diuresis. Escalate diuresis, cardiology already following- appreciate their input. Patient still w/ GARDUNO to bathroom though breathing well and comfortable on RA, still more diuresis prior to d/c, perhaps tmrw. Echo in, PT.

## 2018-08-20 NOTE — H&P ADULT - NSHPREVIEWOFSYSTEMS_GEN_ALL_CORE
Review of Systems:   CONSTITUTIONAL: No fever.  EYES: No eye pain or discharge.  ENMT:  No sinus or throat pain  NECK: No pain or stiffness  RESPIRATORY: +SOB. Otherwise no cough, wheezing, chills or hemoptysis  CARDIOVASCULAR: No chest pain, palpitations, dizziness, +chronic leg swelling  GASTROINTESTINAL: No abdominal or epigastric pain. No nausea, vomiting, or hematemesis; No diarrhea or constipation. No melena or hematochezia.  GENITOURINARY: No dysuria or incontinence  NEUROLOGICAL: No headaches, memory loss, loss of strength, numbness, or tremors  SKIN: No rashes.  MUSCULOSKELETAL: No new joint pain or swelling; No new muscle, back, or extremity pain  HEME/LYMPH: No easy bruising, or bleeding gums

## 2018-08-20 NOTE — ED ADULT NURSE REASSESSMENT NOTE - NS ED NURSE REASSESS COMMENT FT1
Respiratory [place] at [id number] for ABG. Per respiratory will come and draw ABG. Safety and comfort measures provided.

## 2018-08-20 NOTE — ED PROVIDER NOTE - PHYSICAL EXAMINATION
gen: distressed   Mentation: AAO x 3  psych: mood appropriate  ENT: airway patent  Eyes: conjunctivae clear bilaterally  Cardio: RRR, no m/r/g  Resp: course breath sounds with intermittent wheezing appreciated in lower lung fields   GI: s/nt/nd   Neuro: AAO x 3, sensation and motor function intact, CN 2-12 intact  Skin: No evidence of rash  MSK: normal movement of all extremities, 1+ pitting edema in bilateral lower extremities

## 2018-08-21 VITALS
RESPIRATION RATE: 18 BRPM | TEMPERATURE: 98 F | DIASTOLIC BLOOD PRESSURE: 65 MMHG | SYSTOLIC BLOOD PRESSURE: 126 MMHG | HEART RATE: 61 BPM | OXYGEN SATURATION: 96 %

## 2018-08-21 LAB
ANION GAP SERPL CALC-SCNC: 14 MMOL/L — SIGNIFICANT CHANGE UP (ref 5–17)
BASOPHILS # BLD AUTO: 0.03 K/UL — SIGNIFICANT CHANGE UP (ref 0–0.2)
BASOPHILS NFR BLD AUTO: 0.3 % — SIGNIFICANT CHANGE UP (ref 0–2)
BUN SERPL-MCNC: 30 MG/DL — HIGH (ref 7–23)
CALCIUM SERPL-MCNC: 9.8 MG/DL — SIGNIFICANT CHANGE UP (ref 8.4–10.5)
CHLORIDE SERPL-SCNC: 95 MMOL/L — LOW (ref 96–108)
CO2 SERPL-SCNC: 26 MMOL/L — SIGNIFICANT CHANGE UP (ref 22–31)
CREAT SERPL-MCNC: 1.1 MG/DL — SIGNIFICANT CHANGE UP (ref 0.5–1.3)
EOSINOPHIL # BLD AUTO: 0.59 K/UL — HIGH (ref 0–0.5)
EOSINOPHIL NFR BLD AUTO: 6.1 % — HIGH (ref 0–6)
GLUCOSE SERPL-MCNC: 184 MG/DL — HIGH (ref 70–99)
HBA1C BLD-MCNC: 7.5 % — HIGH (ref 4–5.6)
HCT VFR BLD CALC: 42.9 % — SIGNIFICANT CHANGE UP (ref 34.5–45)
HGB BLD-MCNC: 14.5 G/DL — SIGNIFICANT CHANGE UP (ref 11.5–15.5)
IMM GRANULOCYTES NFR BLD AUTO: 0.8 % — SIGNIFICANT CHANGE UP (ref 0–1.5)
LYMPHOCYTES # BLD AUTO: 1.69 K/UL — SIGNIFICANT CHANGE UP (ref 1–3.3)
LYMPHOCYTES # BLD AUTO: 17.4 % — SIGNIFICANT CHANGE UP (ref 13–44)
MAGNESIUM SERPL-MCNC: 1.7 MG/DL — SIGNIFICANT CHANGE UP (ref 1.6–2.6)
MCHC RBC-ENTMCNC: 30.5 PG — SIGNIFICANT CHANGE UP (ref 27–34)
MCHC RBC-ENTMCNC: 33.8 GM/DL — SIGNIFICANT CHANGE UP (ref 32–36)
MCV RBC AUTO: 90.3 FL — SIGNIFICANT CHANGE UP (ref 80–100)
MONOCYTES # BLD AUTO: 0.76 K/UL — SIGNIFICANT CHANGE UP (ref 0–0.9)
MONOCYTES NFR BLD AUTO: 7.8 % — SIGNIFICANT CHANGE UP (ref 2–14)
NEUTROPHILS # BLD AUTO: 6.56 K/UL — SIGNIFICANT CHANGE UP (ref 1.8–7.4)
NEUTROPHILS NFR BLD AUTO: 67.6 % — SIGNIFICANT CHANGE UP (ref 43–77)
PLATELET # BLD AUTO: 219 K/UL — SIGNIFICANT CHANGE UP (ref 150–400)
POTASSIUM SERPL-MCNC: 3.6 MMOL/L — SIGNIFICANT CHANGE UP (ref 3.5–5.3)
POTASSIUM SERPL-SCNC: 3.6 MMOL/L — SIGNIFICANT CHANGE UP (ref 3.5–5.3)
RBC # BLD: 4.75 M/UL — SIGNIFICANT CHANGE UP (ref 3.8–5.2)
RBC # FLD: 13.7 % — SIGNIFICANT CHANGE UP (ref 10.3–14.5)
SODIUM SERPL-SCNC: 135 MMOL/L — SIGNIFICANT CHANGE UP (ref 135–145)
WBC # BLD: 9.71 K/UL — SIGNIFICANT CHANGE UP (ref 3.8–10.5)
WBC # FLD AUTO: 9.71 K/UL — SIGNIFICANT CHANGE UP (ref 3.8–10.5)

## 2018-08-21 PROCEDURE — 96374 THER/PROPH/DIAG INJ IV PUSH: CPT

## 2018-08-21 PROCEDURE — 83735 ASSAY OF MAGNESIUM: CPT

## 2018-08-21 PROCEDURE — 99285 EMERGENCY DEPT VISIT HI MDM: CPT | Mod: 25

## 2018-08-21 PROCEDURE — 93005 ELECTROCARDIOGRAM TRACING: CPT

## 2018-08-21 PROCEDURE — 94660 CPAP INITIATION&MGMT: CPT

## 2018-08-21 PROCEDURE — 80053 COMPREHEN METABOLIC PANEL: CPT

## 2018-08-21 PROCEDURE — 85027 COMPLETE CBC AUTOMATED: CPT

## 2018-08-21 PROCEDURE — 97162 PT EVAL MOD COMPLEX 30 MIN: CPT

## 2018-08-21 PROCEDURE — 93306 TTE W/DOPPLER COMPLETE: CPT | Mod: 26

## 2018-08-21 PROCEDURE — 96376 TX/PRO/DX INJ SAME DRUG ADON: CPT

## 2018-08-21 PROCEDURE — 80048 BASIC METABOLIC PNL TOTAL CA: CPT

## 2018-08-21 PROCEDURE — 71045 X-RAY EXAM CHEST 1 VIEW: CPT

## 2018-08-21 PROCEDURE — G8978: CPT

## 2018-08-21 PROCEDURE — 83036 HEMOGLOBIN GLYCOSYLATED A1C: CPT

## 2018-08-21 PROCEDURE — G8979: CPT

## 2018-08-21 PROCEDURE — 85730 THROMBOPLASTIN TIME PARTIAL: CPT

## 2018-08-21 PROCEDURE — 85610 PROTHROMBIN TIME: CPT

## 2018-08-21 PROCEDURE — 96375 TX/PRO/DX INJ NEW DRUG ADDON: CPT

## 2018-08-21 PROCEDURE — G8980: CPT

## 2018-08-21 PROCEDURE — 80162 ASSAY OF DIGOXIN TOTAL: CPT

## 2018-08-21 PROCEDURE — 83880 ASSAY OF NATRIURETIC PEPTIDE: CPT

## 2018-08-21 PROCEDURE — 96365 THER/PROPH/DIAG IV INF INIT: CPT

## 2018-08-21 PROCEDURE — 93306 TTE W/DOPPLER COMPLETE: CPT

## 2018-08-21 PROCEDURE — 84484 ASSAY OF TROPONIN QUANT: CPT

## 2018-08-21 RX ORDER — ASPIRIN/CALCIUM CARB/MAGNESIUM 324 MG
81 TABLET ORAL DAILY
Qty: 0 | Refills: 0 | Status: DISCONTINUED | OUTPATIENT
Start: 2018-08-21 | End: 2018-08-21

## 2018-08-21 RX ORDER — ASPIRIN/CALCIUM CARB/MAGNESIUM 324 MG
1 TABLET ORAL
Qty: 0 | Refills: 0 | COMMUNITY

## 2018-08-21 RX ORDER — TRIAMTERENE/HYDROCHLOROTHIAZID 75 MG-50MG
1 TABLET ORAL
Qty: 0 | Refills: 0 | DISCHARGE
Start: 2018-08-21

## 2018-08-21 RX ORDER — TRIAMTERENE/HYDROCHLOROTHIAZID 75 MG-50MG
2 TABLET ORAL
Qty: 0 | Refills: 0 | DISCHARGE
Start: 2018-08-21

## 2018-08-21 RX ORDER — DIGOXIN 250 MCG
1 TABLET ORAL
Qty: 0 | Refills: 0 | COMMUNITY

## 2018-08-21 RX ORDER — METOPROLOL TARTRATE 50 MG
1 TABLET ORAL
Qty: 0 | Refills: 0 | COMMUNITY

## 2018-08-21 RX ADMIN — Medication 2 CAPSULE(S): at 05:39

## 2018-08-21 RX ADMIN — Medication 0.12 MILLIGRAM(S): at 05:38

## 2018-08-21 RX ADMIN — Medication 100 MILLIGRAM(S): at 05:40

## 2018-08-21 RX ADMIN — HEPARIN SODIUM 5000 UNIT(S): 5000 INJECTION INTRAVENOUS; SUBCUTANEOUS at 05:39

## 2018-08-21 NOTE — PROGRESS NOTE ADULT - PROBLEM SELECTOR PROBLEM 1
Acute on chronic diastolic (congestive) heart failure
Acute on chronic diastolic (congestive) heart failure

## 2018-08-21 NOTE — PHYSICAL THERAPY INITIAL EVALUATION ADULT - PLANNED THERAPY INTERVENTIONS, PT EVAL
bed mobility training/balance training/transfer training/gait training/GOAL: Pt will negotiate stairs with 1 handrail with step to pattern  and supervision in 2wks./strengthening

## 2018-08-21 NOTE — DISCHARGE NOTE ADULT - HOSPITAL COURSE
89 yo woman, independent in ADLs and IADLs w/ home walker + lives independently downstairs from daughter w/ no glasses or hearing aids, w/ hfpef + afib no AC who p/w SOB in setting of eating a pint of chinese food evening prior to admission, found to be in flash pulm edema w/ htn urgency 200/90 requiring IV lasix and BiPAP, rapidly improved. dyazide 2 cap started  2 D cho 60 EF     Dr Delfina mustafa for Discharge

## 2018-08-21 NOTE — DISCHARGE NOTE ADULT - MEDICATION SUMMARY - MEDICATIONS TO TAKE
I will START or STAY ON the medications listed below when I get home from the hospital:    hydrochlorothiazide-triamterene 25 mg-37.5 mg oral capsule  -- 2 cap(s) by mouth once a day  -- Indication: For diuretic

## 2018-08-21 NOTE — DISCHARGE NOTE ADULT - MEDICATION SUMMARY - MEDICATIONS TO STOP TAKING
I will STOP taking the medications listed below when I get home from the hospital:  None I will STOP taking the medications listed below when I get home from the hospital:    hydroCHLOROthiazide 50 mg oral tablet  -- 1 tab(s) by mouth once a day

## 2018-08-21 NOTE — PROGRESS NOTE ADULT - PROBLEM SELECTOR PLAN 2
resolved, currently asymptomatic  home BP regimen as above
-  - remains in sinus rhythm.  - cont digoxin  - on ASA not on AC per primary cardiologist.  - DC planning.    Anthony Mathis M.D., [facility]  [phone]

## 2018-08-21 NOTE — DISCHARGE NOTE ADULT - CARE PLAN
Principal Discharge DX:	Acute on chronic diastolic (congestive) heart failure  Goal:	ct dyazide  Assessment and plan of treatment:	f/u cardiology  Secondary Diagnosis:	HTN (hypertension)  Goal:	ct betabloker

## 2018-08-21 NOTE — DISCHARGE NOTE ADULT - CARE PROVIDER_API CALL
Goldberg, Joel (MD), Cardiovascular Disease; Internal Medicine  [address] [place] [address]  Phone: [phone]  Fax: [phone] Anthony Mathis), Cardiovascular Disease; Internal Medicine  [address] [place] [address]  Phone: [phone]  Fax: [phone]

## 2018-08-21 NOTE — DISCHARGE NOTE ADULT - PATIENT PORTAL LINK FT
You can access the [facility] Patient Portal, offered by [facility], by registering with the following website: [email or web address]

## 2018-08-21 NOTE — PHYSICAL THERAPY INITIAL EVALUATION ADULT - PERTINENT HX OF CURRENT PROBLEM, REHAB EVAL
89y/o F who p/w SOB in setting of eating a pint of chinese food last night. She awoke at 1AM to phone her daughter and tell her that she's not breathing quite right. Per daughter who came right up stairs, no diaphoresis or paleness, just took pt to hospital based on subjective report. Pt was still ambulating independently at the time, just uncomfortable and tachypneic. Pt denies CP or arm/shoulder/neck/jaw pain. No missed doses of diuretic. No recent sickness, no fevers or chills. CXR(-)

## 2018-08-21 NOTE — PROGRESS NOTE ADULT - PROBLEM SELECTOR PLAN 1
-  - cont dyazide 2 capsules po daily for now.  - repeat echo as inpatient vs. outpatient.  - discussed importance of adherence to low-sodium diet. -  - cont dyazide 2 capsules po daily for now.  - Echo pending.  - discussed importance of adherence to low-sodium diet.

## 2018-08-21 NOTE — PHYSICAL THERAPY INITIAL EVALUATION ADULT - ADDITIONAL COMMENTS
Pt lives in a 2 family home with daughter, 4 small steps to enter in which pt requires assist for (daughter provides). pt ambulates with rollator independently and requires assist with bathing. Dresses independently.

## 2018-08-21 NOTE — PROGRESS NOTE ADULT - SUBJECTIVE AND OBJECTIVE BOX
Patient is a 89y old  Female who presents with a chief complaint of SOB (20 Aug 2018 10:48)      SUBJECTIVE / OVERNIGHT EVENTS:    Patient seen and examined. denies cp sob. feels improved. daughter at bedside.      Vital Signs Last 24 Hrs  T(C): 36.7 (21 Aug 2018 04:49), Max: 36.7 (20 Aug 2018 19:31)  T(F): 98.1 (21 Aug 2018 04:49), Max: 98.1 (20 Aug 2018 19:31)  HR: 70 (21 Aug 2018 04:49) (55 - 70)  BP: 151/80 (21 Aug 2018 04:49) (118/66 - 151/80)  BP(mean): --  RR: 18 (21 Aug 2018 04:49) (18 - 18)  SpO2: 93% (21 Aug 2018 04:49) (92% - 94%)  I&O's Summary    20 Aug 2018 07:01  -  21 Aug 2018 07:00  --------------------------------------------------------  IN: 760 mL / OUT: 1100 mL / NET: -340 mL    21 Aug 2018 07:01  -  21 Aug 2018 11:11  --------------------------------------------------------  IN: 240 mL / OUT: 250 mL / NET: -10 mL        PE:  GENERAL: NAD, AAOx3  HEAD:  Atraumatic, Normocephalic  EYES: EOMI, PERRLA, conjunctiva and sclera clear  NECK: Supple, No JVD  CHEST/LUNG: CTABL, No wheeze  HEART: Regular rate and rhythm; + murmur  ABDOMEN: Soft, Nontender, Nondistended; Bowel sounds present  EXTREMITIES:  2+ Peripheral Pulses, No clubbing, cyanosis, or edema  SKIN: No rashes or lesions  NEURO: No focal deficits    LABS:                        14.5   9.71  )-----------( 219      ( 21 Aug 2018 07:26 )             42.9     08-21    135  |  95<L>  |  30<H>  ----------------------------<  184<H>  3.6   |  26  |  1.10    Ca    9.8      21 Aug 2018 06:02  Mg     1.7     08-21    TPro  7.5  /  Alb  4.1  /  TBili  0.2  /  DBili  x   /  AST  20  /  ALT  11  /  AlkPhos  106  08-20    PT/INR - ( 20 Aug 2018 02:57 )   PT: 12.0 sec;   INR: 1.11 ratio         PTT - ( 20 Aug 2018 02:57 )  PTT:27.5 sec  CAPILLARY BLOOD GLUCOSE                RADIOLOGY & ADDITIONAL TESTS:    Imaging Personally Reviewed:  [x] YES  [ ] NO    Consultant(s) Notes Reviewed:  [x] YES  [ ] NO    MEDICATIONS  (STANDING):  digoxin     Tablet 0.125 milliGRAM(s) Oral daily  heparin  Injectable 5000 Unit(s) SubCutaneous every 12 hours  metoprolol succinate  milliGRAM(s) Oral daily  triamterene 37.5 mG/hydrochlorothiazide 25 mG Capsule 2 Capsule(s) Oral daily    MEDICATIONS  (PRN):      Care Discussed with Consultants/Other Providers [x] YES  [ ] NO    HEALTH ISSUES - PROBLEM Dx:  Prophylactic measure: Prophylactic measure  Advanced care planning/counseling discussion: Advanced care planning/counseling discussion  Leukocytosis: Leukocytosis  Hypertensive urgency: Hypertensive urgency  Paroxysmal atrial fibrillation: Paroxysmal atrial fibrillation  Acute on chronic diastolic (congestive) heart failure: Acute on chronic diastolic (congestive) heart failure  Shortness of breath: Shortness of breath

## 2018-08-21 NOTE — PROGRESS NOTE ADULT - SUBJECTIVE AND OBJECTIVE BOX
[facility] Cardiology Progress Note  _______________________________    Pt. seen and examined. No new cardiac-related complaints. Breathing has improved.    Telemetry -    T(C): 36.7 (08-21-18 @ 04:49), Max: 36.8 (08-20-18 @ 09:30)  HR: 70 (08-21-18 @ 04:49) (55 - 70)  BP: 151/80 (08-21-18 @ 04:49) (118/66 - 169/94)  RR: 18 (08-21-18 @ 04:49) (16 - 18)  SpO2: 93% (08-21-18 @ 04:49) (92% - 95%)  I&O's Summary    20 Aug 2018 07:01  -  21 Aug 2018 07:00  --------------------------------------------------------  IN: 760 mL / OUT: 1100 mL / NET: -340 mL    21 Aug 2018 07:01  -  21 Aug 2018 09:13  --------------------------------------------------------  IN: 0 mL / OUT: 250 mL / NET: -250 mL        PHYSICAL EXAM:  GENERAL: Alert, NAD  NECK: Supple, No JVD, No carotid bruit.  CHEST/LUNG: slight bibasilar crackles.  HEART: S1 S2 normal, RRR,  soft holosystolic murmur LLSB and apex.  ABDOMEN: Soft, Nontender, Nondistended; Bowel sounds present  EXTREMITIES: stasis changes of LE's. No edema      LABS:                        14.5   9.71  )-----------( 219      ( 21 Aug 2018 07:26 )             42.9     08-21    135  |  95<L>  |  30<H>  ----------------------------<  184<H>  3.6   |  26  |  1.10    Ca    9.8      21 Aug 2018 06:02  Mg     1.7     08-21    TPro  7.5  /  Alb  4.1  /  TBili  0.2  /  DBili  x   /  AST  20  /  ALT  11  /  AlkPhos  106  08-20    PT/INR - ( 20 Aug 2018 02:57 )   PT: 12.0 sec;   INR: 1.11 ratio         PTT - ( 20 Aug 2018 02:57 )  PTT:27.5 sec              MEDICATIONS  (STANDING):  digoxin     Tablet 0.125 milliGRAM(s) Oral daily  heparin  Injectable 5000 Unit(s) SubCutaneous every 12 hours  metoprolol succinate  milliGRAM(s) Oral daily  triamterene 37.5 mG/hydrochlorothiazide 25 mG Capsule 2 Capsule(s) Oral daily    MEDICATIONS  (PRN):      RADIOLOGY & ADDITIONAL TESTS: [facility] Cardiology Progress Note  _______________________________    Pt. seen and examined. No new cardiac-related complaints. Breathing has improved.    Telemetry - sinus rhythm 60's    T(C): 36.7 (08-21-18 @ 04:49), Max: 36.8 (08-20-18 @ 09:30)  HR: 70 (08-21-18 @ 04:49) (55 - 70)  BP: 151/80 (08-21-18 @ 04:49) (118/66 - 169/94)  RR: 18 (08-21-18 @ 04:49) (16 - 18)  SpO2: 93% (08-21-18 @ 04:49) (92% - 95%)  I&O's Summary    20 Aug 2018 07:01  -  21 Aug 2018 07:00  --------------------------------------------------------  IN: 760 mL / OUT: 1100 mL / NET: -340 mL    21 Aug 2018 07:01  -  21 Aug 2018 09:13  --------------------------------------------------------  IN: 0 mL / OUT: 250 mL / NET: -250 mL        PHYSICAL EXAM:  GENERAL: Alert, NAD  NECK: Supple, No JVD, No carotid bruit.  CHEST/LUNG: slight bibasilar crackles.  HEART: S1 S2 normal, RRR,  soft holosystolic murmur LLSB and apex.  ABDOMEN: Soft, Nontender, Nondistended; Bowel sounds present  EXTREMITIES: stasis changes of LE's. No edema      LABS:                        14.5   9.71  )-----------( 219      ( 21 Aug 2018 07:26 )             42.9     08-21    135  |  95<L>  |  30<H>  ----------------------------<  184<H>  3.6   |  26  |  1.10    Ca    9.8      21 Aug 2018 06:02  Mg     1.7     08-21    TPro  7.5  /  Alb  4.1  /  TBili  0.2  /  DBili  x   /  AST  20  /  ALT  11  /  AlkPhos  106  08-20    PT/INR - ( 20 Aug 2018 02:57 )   PT: 12.0 sec;   INR: 1.11 ratio         PTT - ( 20 Aug 2018 02:57 )  PTT:27.5 sec              MEDICATIONS  (STANDING):  digoxin     Tablet 0.125 milliGRAM(s) Oral daily  heparin  Injectable 5000 Unit(s) SubCutaneous every 12 hours  metoprolol succinate  milliGRAM(s) Oral daily  triamterene 37.5 mG/hydrochlorothiazide 25 mG Capsule 2 Capsule(s) Oral daily    MEDICATIONS  (PRN):      RADIOLOGY & ADDITIONAL TESTS:

## 2018-08-21 NOTE — DISCHARGE NOTE ADULT - MEDICATION SUMMARY - MEDICATIONS TO CHANGE
I will SWITCH the dose or number of times a day I take the medications listed below when I get home from the hospital:    hydroCHLOROthiazide 50 mg oral tablet  -- 1 tab(s) by mouth once a day I will SWITCH the dose or number of times a day I take the medications listed below when I get home from the hospital:  None

## 2018-08-21 NOTE — PROGRESS NOTE ADULT - ASSESSMENT
89 yo woman, independent in ADLs and IADLs w/ home walker + lives independently downstairs from daughter w/ no glasses or hearing aids, w/ hfpef + afib no AC who p/w SOB in setting of eating a pint of chinese food evening prior to admission, found to be in flash pulm edema w/ htn urgency 200/90 requiring IV lasix and BiPAP, rapidly improved.
89 F CAD, CVA, HTN, asymmetric LVH, HFpEF on dyazide, paroxysmal Afib on digoxin who was admitted with shortness of breath/acute on chronic diastolic HF in setting of high-sodium meal.

## 2019-10-27 ENCOUNTER — EMERGENCY (EMERGENCY)
Facility: HOSPITAL | Age: 84
LOS: 1 days | Discharge: HOME | End: 2019-10-27
Attending: PERSONAL EMERGENCY RESPONSE ATTENDANT
Payer: MEDICARE

## 2019-10-27 VITALS
OXYGEN SATURATION: 96 % | SYSTOLIC BLOOD PRESSURE: 132 MMHG | TEMPERATURE: 98 F | DIASTOLIC BLOOD PRESSURE: 70 MMHG | RESPIRATION RATE: 16 BRPM | HEART RATE: 74 BPM

## 2019-10-27 VITALS
RESPIRATION RATE: 16 BRPM | HEART RATE: 67 BPM | OXYGEN SATURATION: 95 % | SYSTOLIC BLOOD PRESSURE: 102 MMHG | DIASTOLIC BLOOD PRESSURE: 62 MMHG | TEMPERATURE: 98 F

## 2019-10-27 DIAGNOSIS — Z90.49 ACQUIRED ABSENCE OF OTHER SPECIFIED PARTS OF DIGESTIVE TRACT: Chronic | ICD-10-CM

## 2019-10-27 PROBLEM — I50.9 HEART FAILURE, UNSPECIFIED: Chronic | Status: ACTIVE | Noted: 2018-08-20

## 2019-10-27 PROBLEM — I48.0 PAROXYSMAL ATRIAL FIBRILLATION: Chronic | Status: ACTIVE | Noted: 2018-08-20

## 2019-10-27 LAB
ALBUMIN SERPL ELPH-MCNC: 3.3 G/DL — SIGNIFICANT CHANGE UP (ref 3.3–5)
ALBUMIN SERPL ELPH-MCNC: 3.4 G/DL — SIGNIFICANT CHANGE UP (ref 3.3–5)
ALP SERPL-CCNC: 100 U/L — SIGNIFICANT CHANGE UP (ref 40–120)
ALP SERPL-CCNC: 107 U/L — SIGNIFICANT CHANGE UP (ref 40–120)
ALT FLD-CCNC: 14 U/L — SIGNIFICANT CHANGE UP (ref 10–45)
ALT FLD-CCNC: 17 U/L — SIGNIFICANT CHANGE UP (ref 10–45)
ANION GAP SERPL CALC-SCNC: 13 MMOL/L — SIGNIFICANT CHANGE UP (ref 5–17)
APPEARANCE UR: CLEAR — SIGNIFICANT CHANGE UP
AST SERPL-CCNC: 30 U/L — SIGNIFICANT CHANGE UP (ref 10–40)
AST SERPL-CCNC: 35 U/L — SIGNIFICANT CHANGE UP (ref 10–40)
BACTERIA # UR AUTO: NEGATIVE — SIGNIFICANT CHANGE UP
BASOPHILS # BLD AUTO: 0.05 K/UL — SIGNIFICANT CHANGE UP (ref 0–0.2)
BASOPHILS NFR BLD AUTO: 0.3 % — SIGNIFICANT CHANGE UP (ref 0–2)
BILIRUB SERPL-MCNC: 0.7 MG/DL — SIGNIFICANT CHANGE UP (ref 0.2–1.2)
BILIRUB SERPL-MCNC: 0.8 MG/DL — SIGNIFICANT CHANGE UP (ref 0.2–1.2)
BILIRUB UR-MCNC: NEGATIVE — SIGNIFICANT CHANGE UP
BUN SERPL-MCNC: 75 MG/DL — HIGH (ref 7–23)
BUN SERPL-MCNC: 78 MG/DL — HIGH (ref 7–23)
CALCIUM SERPL-MCNC: 9.7 MG/DL — SIGNIFICANT CHANGE UP (ref 8.4–10.5)
CALCIUM SERPL-MCNC: SIGNIFICANT CHANGE UP MG/DL (ref 8.4–10.5)
CHLORIDE SERPL-SCNC: 90 MMOL/L — LOW (ref 96–108)
CHLORIDE SERPL-SCNC: SIGNIFICANT CHANGE UP MMOL/L (ref 96–108)
CO2 SERPL-SCNC: 29 MMOL/L — SIGNIFICANT CHANGE UP (ref 22–31)
CO2 SERPL-SCNC: SIGNIFICANT CHANGE UP MMOL/L (ref 22–31)
COLOR SPEC: YELLOW — SIGNIFICANT CHANGE UP
CREAT SERPL-MCNC: 1.8 MG/DL — HIGH (ref 0.5–1.3)
CREAT SERPL-MCNC: 1.94 MG/DL — HIGH (ref 0.5–1.3)
DIFF PNL FLD: NEGATIVE — SIGNIFICANT CHANGE UP
EOSINOPHIL # BLD AUTO: 0.03 K/UL — SIGNIFICANT CHANGE UP (ref 0–0.5)
EOSINOPHIL NFR BLD AUTO: 0.2 % — SIGNIFICANT CHANGE UP (ref 0–6)
EPI CELLS # UR: 0 /HPF — SIGNIFICANT CHANGE UP
GLUCOSE SERPL-MCNC: 135 MG/DL — HIGH (ref 70–99)
GLUCOSE SERPL-MCNC: 189 MG/DL — HIGH (ref 70–99)
GLUCOSE UR QL: NEGATIVE — SIGNIFICANT CHANGE UP
HCT VFR BLD CALC: 42.9 % — SIGNIFICANT CHANGE UP (ref 34.5–45)
HGB BLD-MCNC: 14.2 G/DL — SIGNIFICANT CHANGE UP (ref 11.5–15.5)
HYALINE CASTS # UR AUTO: 0 /LPF — SIGNIFICANT CHANGE UP (ref 0–2)
IMM GRANULOCYTES NFR BLD AUTO: 0.5 % — SIGNIFICANT CHANGE UP (ref 0–1.5)
KETONES UR-MCNC: NEGATIVE — SIGNIFICANT CHANGE UP
LEUKOCYTE ESTERASE UR-ACNC: NEGATIVE — SIGNIFICANT CHANGE UP
LYMPHOCYTES # BLD AUTO: 1.74 K/UL — SIGNIFICANT CHANGE UP (ref 1–3.3)
LYMPHOCYTES # BLD AUTO: 11.7 % — LOW (ref 13–44)
MCHC RBC-ENTMCNC: 29.6 PG — SIGNIFICANT CHANGE UP (ref 27–34)
MCHC RBC-ENTMCNC: 33.1 GM/DL — SIGNIFICANT CHANGE UP (ref 32–36)
MCV RBC AUTO: 89.4 FL — SIGNIFICANT CHANGE UP (ref 80–100)
MONOCYTES # BLD AUTO: 1.22 K/UL — HIGH (ref 0–0.9)
MONOCYTES NFR BLD AUTO: 8.2 % — SIGNIFICANT CHANGE UP (ref 2–14)
NEUTROPHILS # BLD AUTO: 11.81 K/UL — HIGH (ref 1.8–7.4)
NEUTROPHILS NFR BLD AUTO: 79.1 % — HIGH (ref 43–77)
NITRITE UR-MCNC: NEGATIVE — SIGNIFICANT CHANGE UP
NRBC # BLD: 0 /100 WBCS — SIGNIFICANT CHANGE UP (ref 0–0)
PH UR: 6 — SIGNIFICANT CHANGE UP (ref 5–8)
PLATELET # BLD AUTO: 297 K/UL — SIGNIFICANT CHANGE UP (ref 150–400)
POTASSIUM SERPL-MCNC: 3 MMOL/L — LOW (ref 3.5–5.3)
POTASSIUM SERPL-MCNC: 3.9 MMOL/L — SIGNIFICANT CHANGE UP (ref 3.5–5.3)
POTASSIUM SERPL-SCNC: 3 MMOL/L — LOW (ref 3.5–5.3)
POTASSIUM SERPL-SCNC: 3.9 MMOL/L — SIGNIFICANT CHANGE UP (ref 3.5–5.3)
PROT SERPL-MCNC: 7.1 G/DL — SIGNIFICANT CHANGE UP (ref 6–8.3)
PROT SERPL-MCNC: 7.8 G/DL — SIGNIFICANT CHANGE UP (ref 6–8.3)
PROT UR-MCNC: NEGATIVE — SIGNIFICANT CHANGE UP
RBC # BLD: 4.8 M/UL — SIGNIFICANT CHANGE UP (ref 3.8–5.2)
RBC # FLD: 12.9 % — SIGNIFICANT CHANGE UP (ref 10.3–14.5)
RBC CASTS # UR COMP ASSIST: 1 /HPF — SIGNIFICANT CHANGE UP (ref 0–4)
SODIUM SERPL-SCNC: 132 MMOL/L — LOW (ref 135–145)
SODIUM SERPL-SCNC: 135 MMOL/L — SIGNIFICANT CHANGE UP (ref 135–145)
SP GR SPEC: 1.01 — SIGNIFICANT CHANGE UP (ref 1.01–1.02)
UROBILINOGEN FLD QL: NEGATIVE — SIGNIFICANT CHANGE UP
WBC # BLD: 14.93 K/UL — HIGH (ref 3.8–10.5)
WBC # FLD AUTO: 14.93 K/UL — HIGH (ref 3.8–10.5)
WBC UR QL: 0 /HPF — SIGNIFICANT CHANGE UP (ref 0–5)

## 2019-10-27 PROCEDURE — 72125 CT NECK SPINE W/O DYE: CPT | Mod: 26

## 2019-10-27 PROCEDURE — 73552 X-RAY EXAM OF FEMUR 2/>: CPT | Mod: 26,RT

## 2019-10-27 PROCEDURE — 74176 CT ABD & PELVIS W/O CONTRAST: CPT | Mod: 26

## 2019-10-27 PROCEDURE — 73090 X-RAY EXAM OF FOREARM: CPT | Mod: 26,LT

## 2019-10-27 PROCEDURE — 73502 X-RAY EXAM HIP UNI 2-3 VIEWS: CPT | Mod: 26,RT

## 2019-10-27 PROCEDURE — 73562 X-RAY EXAM OF KNEE 3: CPT | Mod: 26,RT

## 2019-10-27 PROCEDURE — 73060 X-RAY EXAM OF HUMERUS: CPT | Mod: 26,LT

## 2019-10-27 PROCEDURE — 93010 ELECTROCARDIOGRAM REPORT: CPT

## 2019-10-27 PROCEDURE — 99284 EMERGENCY DEPT VISIT MOD MDM: CPT

## 2019-10-27 PROCEDURE — 73070 X-RAY EXAM OF ELBOW: CPT | Mod: 26,LT

## 2019-10-27 PROCEDURE — 70450 CT HEAD/BRAIN W/O DYE: CPT | Mod: 26

## 2019-10-27 RX ORDER — ACETAMINOPHEN 500 MG
650 TABLET ORAL ONCE
Refills: 0 | Status: COMPLETED | OUTPATIENT
Start: 2019-10-27 | End: 2019-10-27

## 2019-10-27 RX ORDER — POTASSIUM CHLORIDE 20 MEQ
40 PACKET (EA) ORAL ONCE
Refills: 0 | Status: COMPLETED | OUTPATIENT
Start: 2019-10-27 | End: 2019-10-27

## 2019-10-27 RX ORDER — POTASSIUM CHLORIDE 20 MEQ
40 PACKET (EA) ORAL ONCE
Refills: 0 | Status: DISCONTINUED | OUTPATIENT
Start: 2019-10-27 | End: 2019-10-27

## 2019-10-27 RX ORDER — SODIUM CHLORIDE 9 MG/ML
1000 INJECTION INTRAMUSCULAR; INTRAVENOUS; SUBCUTANEOUS ONCE
Refills: 0 | Status: COMPLETED | OUTPATIENT
Start: 2019-10-27 | End: 2019-10-27

## 2019-10-27 RX ADMIN — SODIUM CHLORIDE 1000 MILLILITER(S): 9 INJECTION INTRAMUSCULAR; INTRAVENOUS; SUBCUTANEOUS at 08:39

## 2019-10-27 RX ADMIN — Medication 40 MILLIEQUIVALENT(S): at 13:17

## 2019-10-27 NOTE — ED ADULT NURSE NOTE - NSIMPLEMENTINTERV_GEN_ALL_ED
Implemented All Fall with Harm Risk Interventions:  [place] to call system. Call bell, personal items and telephone within reach. Instruct patient to call for assistance. Room bathroom lighting operational. Non-slip footwear when patient is off stretcher. Physically safe environment: no spills, clutter or unnecessary equipment. Stretcher in lowest position, wheels locked, appropriate side rails in place. Provide visual cue, wrist band, yellow gown, etc. Monitor gait and stability. Monitor for mental status changes and reorient to person, place, and time. Review medications for side effects contributing to fall risk. Reinforce activity limits and safety measures with patient and family. Provide visual clues: red socks.

## 2019-10-27 NOTE — ED PROVIDER NOTE - NSFOLLOWUPINSTRUCTIONS_ED_ALL_ED_FT
We see on the CT scan that you have dilation of your colon. You don't have any abdominal pain, no nausea, no vomiting. no fevers. no chills. We recommend that you follow up with your primary care doctor. We offered you an admission to the hospital however, you declined as you don't have any abdominal pain, no vomiting. Please return if you develop these symptoms.       HOME CARE INSTRUCTIONS   - rest  - drink plenty of non-caffeinated non-alcoholic fluids. Keep in mind salt restrictions if you are eating store bought soups  - avoid excessive motrin / Advil / ibuprofen (NSAID's) as these can make abdominal pain worse. Tylenol is a good choice for pain (make sure that you follow dosage guidelines)  - avoid fatty or spicy foods  - make an appointment to see your doctor  - If you have vomiting or diarrhea - DO NOT go to work while you have these contagious symptoms     RETURN TO THE EMERGENCY ROOM RIGHT AWAY  - IF YOUR PAIN CHANGES OR GETS WORSE  - IF YOU HAVE NAUSEA AND VOMITING THAT PERSISTS AND YOU CANNOT KEEP DOWN SOLIDS OR LIQUIDS  - YOUR PAIN RADIATES TO YOUR BACK  - YOU HAVE CHEST PAIN, SHORTNESS OF BREATH OR DIZZINESS  - IF YOU FEEL DIZZY OR WEAK  - IF YOU HAVE CHEST PAIN  - YOU HAVE BLEEDING FROM YOUR RECTUM OR DARK OR TARRY/STICKY STOOLS  - YOU HAVE BLEEDING FROM YOUR PENIS OR VAGINA OR BLOOD IN YOUR URINE  - YOUR PAIN IS NOT IMPROVING IN 24 HOURS  - YOU FEEL SICK OR HAVE CONCERNS

## 2019-10-27 NOTE — ED PROVIDER NOTE - ATTENDING CONTRIBUTION TO CARE
Attending MD Barreto.  Agree with above.  Pt is a 89 yo female with pmhx of CHF, paroxysmal afib, HTN who was seated in a chair, stood up, lost her balance and fell onto her bottom.  No head injury, no LOC.  Pt presents to ED with complaint of R knee pain.  Hx of HTN, CHF.  Pt’s RLE is shortened, rotated.  Pt has TTP and abrasion over R knee.  R hip nontender to palpation.  Rest of extremities non-tender.  Stage II sacral decub.  Planned XR’s of pelvis/R hip/R knee. Family endorses mild weakness x 2 wks with dec ambulation and generalized strength.  No fevers/chills/n/v/d/urinary sxs.  Pt has sig cardiac hx, will obtain screening labs for infection, heart disease as well as trauma imaging of R hip/knee.  PT stable at time of signout pending above.

## 2019-10-27 NOTE — ED PROVIDER NOTE - OBJECTIVE STATEMENT
89F w/ pmh HTN, CHF - p/w RLE pain s/p fall at home. Was sitting in chair, began to stand up then lost balance and fell onto floor, onto her bottom. No loc, head/neck trauma. not on any blood thinners. Reports RLE pain after fall, worst in knee. Denies  any other pain. No fever, n/v/d/c, chest / abd pain, cough, sob, dizziness, dysuria/hematuria. No recent travel, medication change, illness, or hospitalization.    Has been more weak the last 2 wks.

## 2019-10-27 NOTE — ED PROVIDER NOTE - PROGRESS NOTE DETAILS
Imaging showing dilated loops of bowel- CT abd/pelvis ordered at this time. Pt's abdomen also distended on exam. CT abdomen showing marked dilation of loops of bowel, increased from previous. However, patient asymptomatic, not complaining of abdominal pain, passing gas. Multiple family members at bedside along with patient declined surgical evaluation, wish to defer management at this time (possible rectal tube as recommended by radiologist). CT abdomen showing marked dilation of loops of bowel, increased from previous. However, patient asymptomatic, not complaining of abdominal pain, passing gas. Multiple family members at bedside along with patient declined surgical evaluation, wish to defer management at this time (possible rectal tube as recommended by radiologist).    Additionally, no tenderness in maxillofacial area on palpation, despite CT head findings of bony irregularities/fx in the area. Hattie Jc MD discussion w/ patients family (2 daughters) regarding dilated loops of bowel and recommendation to remain in the hospital for rectal tube placement, pt continues to have no chest pain, no shortness of breath. They state she would not want surgery or any additional intervetion. Pt has no tenderness along the face. Pt is having difficulty ambulating, baseline walks w/ a walker, is able to bear weight on lower extremities, however family and the patient state that this has been her baseline for the past two weeks. She has no evidence of a pelvic fx on CT abdomen pelvis. offerred admission for the patient given hx of fall, that was unwitnessed for cardiac evaluation as well as for rectal tube placement however family and patient declined. They state they will follow up with primary care doctor. Bowel dilation appears chronic appearing as pt is tolerating PO, w/ no emesis, no abdominal tenderness, abdomen w/ distention on exam. Discussed increased risk for perforation should come back to the ED if pt develops worsening abdominal pain, vomiting, fevers or chest pain, trouble breathing. verbalized understanding

## 2019-10-27 NOTE — ED ADULT NURSE NOTE - PMH
CHF (congestive heart failure)    Heart failure    HTN (hypertension)    Paroxysmal atrial fibrillation

## 2019-10-27 NOTE — ED PROVIDER NOTE - CLINICAL SUMMARY MEDICAL DECISION MAKING FREE TEXT BOX
89F w/ RLE pain s/p fall - no other obvious injury, will check workup r/o fx, give sx relief, reassess; also component of increasing weakness x 2 wks, will check infectious workup

## 2019-10-27 NOTE — ED PROVIDER NOTE - PHYSICAL EXAMINATION
*GEN:   comfortable, in no acute distress, AOx3  *EYES:   pupils equally round and reactive to light, extra-occular movements intact  *HEENT:   airway patent, moist mucosal membranes, no chipped teeth, full ROM neck w/out midline tenderness to palpation, no morgan sign, no septal hematoma or deviation, no maxillary/mandibular mobility  *CV:   regular rate and rhythm  *RESP:   clear to auscultation bilaterally, non-labored  *ABD:   soft, non-tender  *:   no cva/flank tenderness  *MSK:   RLE shortened and externally rotated  *SKIN:  no abrasions \ bruising \ laceration noted  *NEURO:   AOx3, cranial nerves intact throughout, strength 5/5, no focal loss of sensation, no pronator drift, finger/nose normal *GEN:   comfortable, in no acute distress, AOx3  *EYES:   pupils equally round and reactive to light, extra-occular movements intact  *HEENT:   airway patent, moist mucosal membranes, no chipped teeth, full ROM neck w/out midline tenderness to palpation, no morgan sign, no septal hematoma or deviation, no maxillary/mandibular mobility  *CV:   regular rate and rhythm  *RESP:   clear to auscultation bilaterally, non-labored  *ABD:   soft, non-tender  *:   no cva/flank tenderness  *MSK:   RLE shortened and externally rotated  *SKIN:  no abrasions \ bruising \ laceration noted  *NEURO:   AOx3, cranial nerves intact throughout, no focal loss of sensation, no pronator drift, finger/nose normal

## 2019-10-27 NOTE — ED ADULT NURSE NOTE - OBJECTIVE STATEMENT
89 y/o female presented to the ED from home by EMS. pt fell around early this morning called daughter. daughter notified ems. pt stated she didn't Loose consciousness, no blood thinners, pt stated she fell on side. pt normally gets around at home with walker. pt daughter is at bedside. pt only complains of R knee pain with bending, noticeable shortening of R leg compared to L. pt has scrap on R shin, bruising noted to L elbow and L hip. stage 1 pressure ulcer noted to sacrum with tear to L butt check. pt is wearing brief. A&Ox3, on cardiac monitor. will cont to monitor patient, awaiting md dispo

## 2019-10-27 NOTE — ED PROVIDER NOTE - CARE PLAN
Principal Discharge DX:	Pain of right hip joint Principal Discharge DX:	Pain of right hip joint  Secondary Diagnosis:	Fall, initial encounter

## 2019-10-27 NOTE — ED PROVIDER NOTE - PATIENT PORTAL LINK FT
You can access the FollowMyHealth Patient Portal offered by [facility] by registering at the following website: [email or web address]. By joining [email or web address] FollowMyHealth portal, you will also be able to view your health information using other applications (apps) compatible with our system.

## 2019-10-27 NOTE — ED ADULT NURSE REASSESSMENT NOTE - NS ED NURSE REASSESS COMMENT FT1
Pt denies pain and denies tylenol. Abdomen distended. Pt awaiting CT, currently drinking PO contrast. Family at bedside. Will continue to monitor.

## 2019-10-27 NOTE — ED ADULT NURSE REASSESSMENT NOTE - NS ED NURSE REASSESS COMMENT FT1
Report received from Lesa PORTER. Pt A&Ox3 and VSS with family at bedside. Present to ED s/p fall. Pt denies pain at this time. Awaiting bloodwork, urine and radiology results. Will continue to monitor.

## 2019-10-28 ENCOUNTER — INPATIENT (INPATIENT)
Facility: HOSPITAL | Age: 84
LOS: 8 days | Discharge: NURSING, REHAB OR LONG-TERM CARE | DRG: 683 | End: 2019-11-06
Attending: INTERNAL MEDICINE | Admitting: INTERNAL MEDICINE
Payer: MEDICARE

## 2019-10-28 VITALS
HEART RATE: 76 BPM | WEIGHT: 130.07 LBS | RESPIRATION RATE: 18 BRPM | HEIGHT: 60 IN | OXYGEN SATURATION: 94 % | DIASTOLIC BLOOD PRESSURE: 76 MMHG | SYSTOLIC BLOOD PRESSURE: 156 MMHG | TEMPERATURE: 98 F

## 2019-10-28 DIAGNOSIS — Z90.49 ACQUIRED ABSENCE OF OTHER SPECIFIED PARTS OF DIGESTIVE TRACT: Chronic | ICD-10-CM

## 2019-10-28 DIAGNOSIS — R53.1 WEAKNESS: ICD-10-CM

## 2019-10-28 LAB
ALBUMIN SERPL ELPH-MCNC: 3.6 G/DL — SIGNIFICANT CHANGE UP (ref 3.3–5)
ALP SERPL-CCNC: 102 U/L — SIGNIFICANT CHANGE UP (ref 40–120)
ALT FLD-CCNC: 20 U/L — SIGNIFICANT CHANGE UP (ref 10–45)
ANION GAP SERPL CALC-SCNC: 22 MMOL/L — HIGH (ref 5–17)
APPEARANCE UR: ABNORMAL
AST SERPL-CCNC: 53 U/L — HIGH (ref 10–40)
BACTERIA # UR AUTO: ABNORMAL
BASOPHILS # BLD AUTO: 0.02 K/UL — SIGNIFICANT CHANGE UP (ref 0–0.2)
BASOPHILS NFR BLD AUTO: 0.1 % — SIGNIFICANT CHANGE UP (ref 0–2)
BILIRUB SERPL-MCNC: 0.8 MG/DL — SIGNIFICANT CHANGE UP (ref 0.2–1.2)
BILIRUB UR-MCNC: NEGATIVE — SIGNIFICANT CHANGE UP
BUN SERPL-MCNC: 76 MG/DL — HIGH (ref 7–23)
CALCIUM SERPL-MCNC: 9.2 MG/DL — SIGNIFICANT CHANGE UP (ref 8.4–10.5)
CHLORIDE SERPL-SCNC: 89 MMOL/L — LOW (ref 96–108)
CO2 SERPL-SCNC: 22 MMOL/L — SIGNIFICANT CHANGE UP (ref 22–31)
COLOR SPEC: YELLOW — SIGNIFICANT CHANGE UP
CREAT SERPL-MCNC: 1.65 MG/DL — HIGH (ref 0.5–1.3)
CULTURE RESULTS: NO GROWTH — SIGNIFICANT CHANGE UP
DIFF PNL FLD: ABNORMAL
DIGOXIN SERPL-MCNC: 2 NG/ML — SIGNIFICANT CHANGE UP (ref 0.8–2)
EOSINOPHIL # BLD AUTO: 0.01 K/UL — SIGNIFICANT CHANGE UP (ref 0–0.5)
EOSINOPHIL NFR BLD AUTO: 0.1 % — SIGNIFICANT CHANGE UP (ref 0–6)
EPI CELLS # UR: 8 — SIGNIFICANT CHANGE UP
GLUCOSE SERPL-MCNC: 175 MG/DL — HIGH (ref 70–99)
GLUCOSE UR QL: NEGATIVE — SIGNIFICANT CHANGE UP
HCT VFR BLD CALC: 39.8 % — SIGNIFICANT CHANGE UP (ref 34.5–45)
HGB BLD-MCNC: 13.5 G/DL — SIGNIFICANT CHANGE UP (ref 11.5–15.5)
HYALINE CASTS # UR AUTO: 0 /LPF — SIGNIFICANT CHANGE UP (ref 0–2)
IMM GRANULOCYTES NFR BLD AUTO: 0.6 % — SIGNIFICANT CHANGE UP (ref 0–1.5)
KETONES UR-MCNC: NEGATIVE — SIGNIFICANT CHANGE UP
LEUKOCYTE ESTERASE UR-ACNC: ABNORMAL
LYMPHOCYTES # BLD AUTO: 1.71 K/UL — SIGNIFICANT CHANGE UP (ref 1–3.3)
LYMPHOCYTES # BLD AUTO: 9.6 % — LOW (ref 13–44)
MCHC RBC-ENTMCNC: 29.9 PG — SIGNIFICANT CHANGE UP (ref 27–34)
MCHC RBC-ENTMCNC: 33.9 GM/DL — SIGNIFICANT CHANGE UP (ref 32–36)
MCV RBC AUTO: 88.1 FL — SIGNIFICANT CHANGE UP (ref 80–100)
MONOCYTES # BLD AUTO: 1.31 K/UL — HIGH (ref 0–0.9)
MONOCYTES NFR BLD AUTO: 7.4 % — SIGNIFICANT CHANGE UP (ref 2–14)
NEUTROPHILS # BLD AUTO: 14.67 K/UL — HIGH (ref 1.8–7.4)
NEUTROPHILS NFR BLD AUTO: 82.2 % — HIGH (ref 43–77)
NITRITE UR-MCNC: POSITIVE
NRBC # BLD: 0 /100 WBCS — SIGNIFICANT CHANGE UP (ref 0–0)
PH UR: 8.5 — HIGH (ref 5–8)
PLATELET # BLD AUTO: 315 K/UL — SIGNIFICANT CHANGE UP (ref 150–400)
POTASSIUM SERPL-MCNC: 4.3 MMOL/L — SIGNIFICANT CHANGE UP (ref 3.5–5.3)
POTASSIUM SERPL-SCNC: 4.3 MMOL/L — SIGNIFICANT CHANGE UP (ref 3.5–5.3)
PROT SERPL-MCNC: 7.8 G/DL — SIGNIFICANT CHANGE UP (ref 6–8.3)
PROT UR-MCNC: ABNORMAL
RBC # BLD: 4.52 M/UL — SIGNIFICANT CHANGE UP (ref 3.8–5.2)
RBC # FLD: 12.8 % — SIGNIFICANT CHANGE UP (ref 10.3–14.5)
RBC CASTS # UR COMP ASSIST: >50 /HPF — SIGNIFICANT CHANGE UP (ref 0–4)
SODIUM SERPL-SCNC: 133 MMOL/L — LOW (ref 135–145)
SP GR SPEC: 1.02 — SIGNIFICANT CHANGE UP (ref 1.01–1.02)
SPECIMEN SOURCE: SIGNIFICANT CHANGE UP
TRI-PHOS CRY UR QL COMP ASSIST: ABNORMAL
TROPONIN T, HIGH SENSITIVITY RESULT: 55 NG/L — HIGH (ref 0–51)
TROPONIN T, HIGH SENSITIVITY RESULT: 56 NG/L — HIGH (ref 0–51)
UROBILINOGEN FLD QL: NEGATIVE — SIGNIFICANT CHANGE UP
WBC # BLD: 17.82 K/UL — HIGH (ref 3.8–10.5)
WBC # FLD AUTO: 17.82 K/UL — HIGH (ref 3.8–10.5)
WBC UR QL: 7 /HPF — HIGH (ref 0–5)

## 2019-10-28 PROCEDURE — 71045 X-RAY EXAM CHEST 1 VIEW: CPT | Mod: 26

## 2019-10-28 PROCEDURE — 99285 EMERGENCY DEPT VISIT HI MDM: CPT

## 2019-10-28 PROCEDURE — 93010 ELECTROCARDIOGRAM REPORT: CPT

## 2019-10-28 RX ORDER — HEPARIN SODIUM 5000 [USP'U]/ML
5000 INJECTION INTRAVENOUS; SUBCUTANEOUS EVERY 12 HOURS
Refills: 0 | Status: DISCONTINUED | OUTPATIENT
Start: 2019-10-28 | End: 2019-11-06

## 2019-10-28 RX ORDER — CEFTRIAXONE 500 MG/1
1000 INJECTION, POWDER, FOR SOLUTION INTRAMUSCULAR; INTRAVENOUS EVERY 24 HOURS
Refills: 0 | Status: DISCONTINUED | OUTPATIENT
Start: 2019-10-29 | End: 2019-10-29

## 2019-10-28 RX ORDER — METOPROLOL TARTRATE 50 MG
100 TABLET ORAL DAILY
Refills: 0 | Status: DISCONTINUED | OUTPATIENT
Start: 2019-10-28 | End: 2019-11-06

## 2019-10-28 RX ORDER — CEFTRIAXONE 500 MG/1
1000 INJECTION, POWDER, FOR SOLUTION INTRAMUSCULAR; INTRAVENOUS ONCE
Refills: 0 | Status: COMPLETED | OUTPATIENT
Start: 2019-10-28 | End: 2019-10-28

## 2019-10-28 RX ORDER — CEFTRIAXONE 500 MG/1
INJECTION, POWDER, FOR SOLUTION INTRAMUSCULAR; INTRAVENOUS
Refills: 0 | Status: DISCONTINUED | OUTPATIENT
Start: 2019-10-28 | End: 2019-10-29

## 2019-10-28 RX ORDER — HYDROCHLOROTHIAZIDE 25 MG
25 TABLET ORAL DAILY
Refills: 0 | Status: DISCONTINUED | OUTPATIENT
Start: 2019-10-28 | End: 2019-10-28

## 2019-10-28 RX ORDER — SODIUM CHLORIDE 9 MG/ML
1000 INJECTION, SOLUTION INTRAVENOUS ONCE
Refills: 0 | Status: COMPLETED | OUTPATIENT
Start: 2019-10-28 | End: 2019-10-28

## 2019-10-28 RX ORDER — DIGOXIN 250 MCG
0.12 TABLET ORAL DAILY
Refills: 0 | Status: DISCONTINUED | OUTPATIENT
Start: 2019-10-28 | End: 2019-11-06

## 2019-10-28 RX ADMIN — SODIUM CHLORIDE 1000 MILLILITER(S): 9 INJECTION, SOLUTION INTRAVENOUS at 06:34

## 2019-10-28 RX ADMIN — Medication 100 MILLIGRAM(S): at 18:46

## 2019-10-28 RX ADMIN — HEPARIN SODIUM 5000 UNIT(S): 5000 INJECTION INTRAVENOUS; SUBCUTANEOUS at 18:46

## 2019-10-28 RX ADMIN — Medication 0.12 MILLIGRAM(S): at 18:46

## 2019-10-28 RX ADMIN — CEFTRIAXONE 100 MILLIGRAM(S): 500 INJECTION, POWDER, FOR SOLUTION INTRAMUSCULAR; INTRAVENOUS at 13:16

## 2019-10-28 NOTE — ED ADULT NURSE REASSESSMENT NOTE - NS ED NURSE REASSESS COMMENT FT1
pt resting comfortably in bed. pt found to have stage 3 ulcer to saccum and bilateral leg redness MD diamond aware. will continue to reassess.

## 2019-10-28 NOTE — H&P ADULT - ASSESSMENT
89 yo female with pmhx of HTN, CHF who has been experiencing worsening weakness x 2 wks, s/p mech fall yesterday, CT and xrays neg  unable to ambulate  pt lives w dgtr who is unable to take care of patient at home    #Fall/weakness- unable to ambulate  PT eval  prn tylenol for pain/discomfort    #UTI: (positive UA, leucocytosis) cont ceftriaxone   fu ucx, bld cx    #NITA: prerenal in the setting of FTT and diuretics  hold thiazide and furosemide  hyponatremia- likely thiazide related, monitor off meds    #Abdominal distension: chronic, CT reviewed  family refused any intervention as long as pt asymptomatic from it    #CHF: last echo aug 2018- normal study- normal LV systolic function  hold home diuretics in the setting of hyponatremia and NITA  resume digoxin, pending levels  mild trops d/t elev cr, ekg reviewed-sinus bakari 57    # HTN: resume metoprolol , hold lasix and dyazide    #GOC: jaguar dgtr and reshma on phone who spoke to pcp dr Ly and wish no aggressive measures  made pt DNR/DNI    [facility]  [phone]

## 2019-10-28 NOTE — ED PROVIDER NOTE - CLINICAL SUMMARY MEDICAL DECISION MAKING FREE TEXT BOX
89yo woman presents to the ED for progressive weakness and inability of family to take care of her at home with no fever or localizing symptoms and with workup with imaging done yesterday's ED visit. Will recheck eletrolytes after repletion yesterday. Will evaluate with CXR for possible PNA. Will admit for further management. 89yo woman presents to the ED for progressive weakness and inability of family to take care of her at home with no fever or localizing symptoms and with workup with imaging done yesterday's ED visit. Will recheck electrolytes after repletion yesterday. Will evaluate with CXR for possible PNA. Will admit for further management.

## 2019-10-28 NOTE — H&P ADULT - NSHPPHYSICALEXAM_GEN_ALL_CORE
PHYSICAL EXAM:  GENERAL: NAD, well-groomed, well-developed  HEAD:  Atraumatic, Normocephalic  EYES: EOMI, PERRLA, conjunctiva and sclera clear  ENMT: No tonsillar erythema, exudates, or enlargement; Moist mucous membranes  NECK: Supple, No JVD, Normal thyroid  HEART: Regular rate and rhythm; No murmurs, rubs, or gallops  RESPIRATORY: CTA B/L, No W/R/R  ABDOMEN: Soft, Nontender, distended; Bowel sounds present  NEUROLOGY: A&Ox2-3, nonfocal, moving all extremities, rt leg dec rom dt pain post fall  EXTREMITIES: b/l nonpitting edema  SKIN: warm, dry, normal color, no rash or abnormal lesions  echymosis rt medial thigh lt elbow

## 2019-10-28 NOTE — ED PROVIDER NOTE - NS ED ROS FT
General: no fevers, +generalized weakness  Head: no headache  Eyes: no acute vision change  ENT: no nasal discharge/congestion  CV: no chest pain  Resp: no SOB  GI: no N/V/D, +abdominal distension  : no dysuria  MSK: +bilateral thigh pain  Skin: no new rash  Neuro: no focal weakness, no change in sensation

## 2019-10-28 NOTE — ED ADULT NURSE NOTE - NSIMPLEMENTINTERV_GEN_ALL_ED
Implemented All Fall Risk Interventions:  [place] to call system. Call bell, personal items and telephone within reach. Instruct patient to call for assistance. Room bathroom lighting operational. Non-slip footwear when patient is off stretcher. Physically safe environment: no spills, clutter or unnecessary equipment. Stretcher in lowest position, wheels locked, appropriate side rails in place. Provide visual cue, wrist band, yellow gown, etc. Monitor gait and stability. Monitor for mental status changes and reorient to person, place, and time. Review medications for side effects contributing to fall risk. Reinforce activity limits and safety measures with patient and family. 22-Apr-2017 13:01

## 2019-10-28 NOTE — H&P ADULT - HISTORY OF PRESENT ILLNESS
89 yo female with pmhx of HTN, CHF who has been experiencing worsening weakness x 2 wks, seen in ED yesterday because she slid out of her chair onto the floor yesterday.  Pt had CT head, C-spine, XR's.  Pt had mild hypokalemia and was repleted and discharged.  Family now represents to Ed because they state that they can no longer care for pt at home because she is too weak for them to help her to the bathroom. Pt's CTAP yesterday obtained because of abdominal distention recommended possible rectal tube and surg consult.  Pt and family declined this intervention.  She is now brought back by family because they cannot care for her at home.  No other new complaints.      Discussion with family re: pt's long-term plan for care.  They are stating that they can no longer care for her, she is DNR/DNI, they don't wish to have any invasive interventions. 89 yo female with pmhx of HTN, CHF who has been experiencing worsening weakness x 2 wks, seen in ED yesterday because she slid out of her chair onto the floor yesterday.  Pt had CT head, C-spine, XR's.  Pt had mild hypokalemia and was repleted and discharged.  Family now represents to Ed because they state that they can no longer care for pt at home because she is too weak for them to help her to the bathroom. Pt's CTAP yesterday obtained because of abdominal distention recommended possible rectal tube and surg consult.  Pt and family declined this intervention.   No other new complaints.      ROS: pt [place], pain rt leg and low back post fall  no cp/sob/dizziness/fever/chills/abd pain/n/v  no urinary or bowel habit changes- known urinary incontinence

## 2019-10-28 NOTE — ED PROVIDER NOTE - ATTENDING CONTRIBUTION TO CARE
Attending MD Barreto.  Agree with above.  Pt is a 89 yo female with pmhx of HTN, CHF who has been experiencing worsening weakness x 2 wks, seen in ED yesterday because she slid out of her chair onto the floor yesterday.  Pt had CT head, C-spine, XR's.  Pt had mild hypokalemia and was repleted and discharged.  Family now represents to Ed because they state that they can no longer care for pt at home because she is too weak for them to help her to the bathroom. Pt's CTAP yesterday obtained because of abdominal distention recommended possible rectal tube and surg consult.  Pt and family declined this intervention.  She is now brought back by family because they cannot care for her at home.  No other new complaints.      Discussion with family re: pt's long-term plan for care.  They are stating that they can no longer care for her, she is DNR/DNI, they don't wish to have any invasive interventions.   Family states that if pt is able to be improved to the point that she can ambulate with assistance they would like to take her home but she is too weak to ambulate currently.  No report of specific infectious sxs however labs from earlier visit noted to indicate leukocytosis to 14 and no CXR noted. Will obtain CXR and basic labs and admit to medicine for eval for rehab vs. placement.

## 2019-10-28 NOTE — H&P ADULT - NSICDXPASTMEDICALHX_GEN_ALL_CORE_FT
PAST MEDICAL HISTORY:  CHF (congestive heart failure)     Heart failure     HTN (hypertension)     Paroxysmal atrial fibrillation

## 2019-10-28 NOTE — ED PROVIDER NOTE - OBJECTIVE STATEMENT
89yo woman PMH HTN, CHF who was recently seen in ED yesterday after she slipped off her chair and fell on the floor due to progressive weakness over the past 2 weeks was brought to the ED by EMS due to continued weakness. Pt's daughter states that she cannot continue to take care of her mother because she is unable to walk. She walks with a walker at baseline but has been unable to do so in the past 2 weeks. Pt denies any inciting event. No headache, no recent change in vision, no focal weakness in arms or legs. No chest pain or SOB. She has abdominal distension which was evaluated yesterday and was offered a rectal tube but family declined. She denies any n/v. 89yo woman PMH HTN, CHF who was recently seen in ED yesterday after she slipped off her chair and fell on the floor due to progressive weakness over the past 2 weeks was brought to the ED by EMS due to continued weakness. Pt's daughter states that she cannot continue to take care of her mother because she is unable to walk. She walks with a walker at baseline but has been unable to do so in the past 2 weeks. Pt denies any inciting event. No headache, no recent change in vision, no focal weakness in arms or legs. No chest pain or SOB. She has abdominal distension which was evaluated yesterday and was offered a rectal tube but family declined. She denies any n/v. She denies chest pain or SOB.

## 2019-10-28 NOTE — ED ADULT TRIAGE NOTE - CHIEF COMPLAINT QUOTE
brought in by family for increasing weakness. family unable to care for patient  requesting evaluation for placement

## 2019-10-28 NOTE — H&P ADULT - NSICDXFAMILYHX_GEN_ALL_CORE_FT
FAMILY HISTORY:  No pertinent family history in first degree relatives    Sibling  Still living? No  Family history of throat cancer, Age at diagnosis: Age Unknown

## 2019-10-28 NOTE — H&P ADULT - NSHPLABSRESULTS_GEN_ALL_CORE
LABS:                        13.5   17.82 )-----------( 315      ( 28 Oct 2019 06:14 )             39.8     10-28    133<L>  |  89<L>  |  76<H>  ----------------------------<  175<H>  4.3   |  22  |  1.65<H>    Ca    9.2      28 Oct 2019 06:14    TPro  7.8  /  Alb  3.6  /  TBili  0.8  /  DBili  x   /  AST  53<H>  /  ALT  20  /  AlkPhos  102  10-28      CAPILLARY BLOOD GLUCOSE            Urinalysis Basic - ( 28 Oct 2019 08:24 )    Color: Yellow / Appearance: Slightly Turbid / S.017 / pH: x  Gluc: x / Ketone: Negative  / Bili: Negative / Urobili: Negative   Blood: x / Protein: 30 mg/dL / Nitrite: Positive   Leuk Esterase: Large / RBC: >50 /hpf / WBC 7 /HPF   Sq Epi: x / Non Sq Epi: 8 / Bacteria: Moderate        RADIOLOGY & ADDITIONAL TESTS:

## 2019-10-28 NOTE — ED PROVIDER NOTE - PHYSICAL EXAMINATION
General: frail-appearing elderly woman in no acute distress  Head: normocephalic, atraumatic  Eyes: clear eyes with no discharge or redness  Mouth: dry mucous membranes  Neck: supple neck  CV: normal rate and rhythm, + bilateral LE pitting edema, peripheral pulses 2+ bilaterally  Respiratory: clear to auscultation bilaterally  Abdomen: distended but soft and nontender  : no suprapubic tenderness, no CVAT  MSK: no Cspine tenderness to palpation, no midline tenderness to palpation  Neuro: alert and oriented x2-3, speech clear, moving all extremities spontaneously  Extremities: full ROM, no tenderness to palpation of joints General: frail-appearing elderly woman in no acute distress  Head: normocephalic, atraumatic  Eyes: clear eyes with no discharge or redness  Mouth: dry mucous membranes  Neck: supple neck  CV: normal rate and rhythm, + bilateral LE pitting edema with redness bilaterally but minimal warmth, peripheral pulses 2+ bilaterally  Respiratory: clear to auscultation bilaterally  Abdomen: distended but soft and nontender  : no suprapubic tenderness, no CVAT  MSK: no Cspine tenderness to palpation, no midline tenderness to palpation  Neuro: alert and oriented x2-3, speech clear, moving all extremities spontaneously  Extremities: full ROM, no tenderness to palpation of joints

## 2019-10-29 LAB
ANION GAP SERPL CALC-SCNC: 15 MMOL/L — SIGNIFICANT CHANGE UP (ref 5–17)
ANION GAP SERPL CALC-SCNC: 20 MMOL/L — HIGH (ref 5–17)
BASOPHILS # BLD AUTO: 0.03 K/UL — SIGNIFICANT CHANGE UP (ref 0–0.2)
BASOPHILS NFR BLD AUTO: 0.1 % — SIGNIFICANT CHANGE UP (ref 0–2)
BUN SERPL-MCNC: 72 MG/DL — HIGH (ref 7–23)
BUN SERPL-MCNC: 80 MG/DL — HIGH (ref 7–23)
CALCIUM SERPL-MCNC: 9 MG/DL — SIGNIFICANT CHANGE UP (ref 8.4–10.5)
CALCIUM SERPL-MCNC: 9.3 MG/DL — SIGNIFICANT CHANGE UP (ref 8.4–10.5)
CHLORIDE SERPL-SCNC: 92 MMOL/L — LOW (ref 96–108)
CHLORIDE SERPL-SCNC: 97 MMOL/L — SIGNIFICANT CHANGE UP (ref 96–108)
CO2 SERPL-SCNC: 23 MMOL/L — SIGNIFICANT CHANGE UP (ref 22–31)
CO2 SERPL-SCNC: 24 MMOL/L — SIGNIFICANT CHANGE UP (ref 22–31)
CREAT SERPL-MCNC: 1.67 MG/DL — HIGH (ref 0.5–1.3)
CREAT SERPL-MCNC: 1.83 MG/DL — HIGH (ref 0.5–1.3)
EOSINOPHIL # BLD AUTO: 0 K/UL — SIGNIFICANT CHANGE UP (ref 0–0.5)
EOSINOPHIL NFR BLD AUTO: 0 % — SIGNIFICANT CHANGE UP (ref 0–6)
GLUCOSE BLDC GLUCOMTR-MCNC: 172 MG/DL — HIGH (ref 70–99)
GLUCOSE BLDC GLUCOMTR-MCNC: 178 MG/DL — HIGH (ref 70–99)
GLUCOSE SERPL-MCNC: 177 MG/DL — HIGH (ref 70–99)
GLUCOSE SERPL-MCNC: 214 MG/DL — HIGH (ref 70–99)
HBA1C BLD-MCNC: 7.8 % — HIGH (ref 4–5.6)
HCT VFR BLD CALC: 39.7 % — SIGNIFICANT CHANGE UP (ref 34.5–45)
HCT VFR BLD CALC: 40.5 % — SIGNIFICANT CHANGE UP (ref 34.5–45)
HGB BLD-MCNC: 13.6 G/DL — SIGNIFICANT CHANGE UP (ref 11.5–15.5)
HGB BLD-MCNC: 13.7 G/DL — SIGNIFICANT CHANGE UP (ref 11.5–15.5)
IMM GRANULOCYTES NFR BLD AUTO: 0.9 % — SIGNIFICANT CHANGE UP (ref 0–1.5)
LACTATE SERPL-SCNC: 1.7 MMOL/L — SIGNIFICANT CHANGE UP (ref 0.7–2)
LYMPHOCYTES # BLD AUTO: 1.83 K/UL — SIGNIFICANT CHANGE UP (ref 1–3.3)
LYMPHOCYTES # BLD AUTO: 6.5 % — LOW (ref 13–44)
MAGNESIUM SERPL-MCNC: 2.6 MG/DL — SIGNIFICANT CHANGE UP (ref 1.6–2.6)
MANUAL SMEAR VERIFICATION: SIGNIFICANT CHANGE UP
MCHC RBC-ENTMCNC: 30 PG — SIGNIFICANT CHANGE UP (ref 27–34)
MCHC RBC-ENTMCNC: 30 PG — SIGNIFICANT CHANGE UP (ref 27–34)
MCHC RBC-ENTMCNC: 33.8 GM/DL — SIGNIFICANT CHANGE UP (ref 32–36)
MCHC RBC-ENTMCNC: 34.3 GM/DL — SIGNIFICANT CHANGE UP (ref 32–36)
MCV RBC AUTO: 87.6 FL — SIGNIFICANT CHANGE UP (ref 80–100)
MCV RBC AUTO: 88.8 FL — SIGNIFICANT CHANGE UP (ref 80–100)
MONOCYTES # BLD AUTO: 2.11 K/UL — HIGH (ref 0–0.9)
MONOCYTES NFR BLD AUTO: 7.5 % — SIGNIFICANT CHANGE UP (ref 2–14)
NEUTROPHILS # BLD AUTO: 23.85 K/UL — HIGH (ref 1.8–7.4)
NEUTROPHILS NFR BLD AUTO: 85 % — HIGH (ref 43–77)
NRBC # BLD: 0 /100 WBCS — SIGNIFICANT CHANGE UP (ref 0–0)
PLAT MORPH BLD: NORMAL — SIGNIFICANT CHANGE UP
PLATELET # BLD AUTO: 323 K/UL — SIGNIFICANT CHANGE UP (ref 150–400)
PLATELET # BLD AUTO: 326 K/UL — SIGNIFICANT CHANGE UP (ref 150–400)
POTASSIUM SERPL-MCNC: 3 MMOL/L — LOW (ref 3.5–5.3)
POTASSIUM SERPL-MCNC: 3.1 MMOL/L — LOW (ref 3.5–5.3)
POTASSIUM SERPL-SCNC: 3 MMOL/L — LOW (ref 3.5–5.3)
POTASSIUM SERPL-SCNC: 3.1 MMOL/L — LOW (ref 3.5–5.3)
RBC # BLD: 4.53 M/UL — SIGNIFICANT CHANGE UP (ref 3.8–5.2)
RBC # BLD: 4.56 M/UL — SIGNIFICANT CHANGE UP (ref 3.8–5.2)
RBC # FLD: 13.1 % — SIGNIFICANT CHANGE UP (ref 10.3–14.5)
RBC # FLD: 13.1 % — SIGNIFICANT CHANGE UP (ref 10.3–14.5)
RBC BLD AUTO: NORMAL — SIGNIFICANT CHANGE UP
SODIUM SERPL-SCNC: 135 MMOL/L — SIGNIFICANT CHANGE UP (ref 135–145)
SODIUM SERPL-SCNC: 136 MMOL/L — SIGNIFICANT CHANGE UP (ref 135–145)
WBC # BLD: 25.57 K/UL — HIGH (ref 3.8–10.5)
WBC # BLD: 28.07 K/UL — HIGH (ref 3.8–10.5)
WBC # FLD AUTO: 25.57 K/UL — HIGH (ref 3.8–10.5)
WBC # FLD AUTO: 28.07 K/UL — HIGH (ref 3.8–10.5)

## 2019-10-29 RX ORDER — ERTAPENEM SODIUM 1 G/1
INJECTION, POWDER, LYOPHILIZED, FOR SOLUTION INTRAMUSCULAR; INTRAVENOUS
Refills: 0 | Status: DISCONTINUED | OUTPATIENT
Start: 2019-10-29 | End: 2019-10-30

## 2019-10-29 RX ORDER — POTASSIUM CHLORIDE 20 MEQ
10 PACKET (EA) ORAL
Refills: 0 | Status: DISCONTINUED | OUTPATIENT
Start: 2019-10-29 | End: 2019-10-29

## 2019-10-29 RX ORDER — SODIUM CHLORIDE 9 MG/ML
1000 INJECTION INTRAMUSCULAR; INTRAVENOUS; SUBCUTANEOUS
Refills: 0 | Status: DISCONTINUED | OUTPATIENT
Start: 2019-10-29 | End: 2019-11-05

## 2019-10-29 RX ORDER — POTASSIUM CHLORIDE 20 MEQ
10 PACKET (EA) ORAL
Refills: 0 | Status: COMPLETED | OUTPATIENT
Start: 2019-10-29 | End: 2019-10-30

## 2019-10-29 RX ORDER — ERTAPENEM SODIUM 1 G/1
500 INJECTION, POWDER, LYOPHILIZED, FOR SOLUTION INTRAMUSCULAR; INTRAVENOUS EVERY 24 HOURS
Refills: 0 | Status: DISCONTINUED | OUTPATIENT
Start: 2019-10-30 | End: 2019-10-30

## 2019-10-29 RX ORDER — POTASSIUM CHLORIDE 20 MEQ
10 PACKET (EA) ORAL
Refills: 0 | Status: COMPLETED | OUTPATIENT
Start: 2019-10-29 | End: 2019-10-29

## 2019-10-29 RX ORDER — ERTAPENEM SODIUM 1 G/1
500 INJECTION, POWDER, LYOPHILIZED, FOR SOLUTION INTRAMUSCULAR; INTRAVENOUS ONCE
Refills: 0 | Status: COMPLETED | OUTPATIENT
Start: 2019-10-29 | End: 2019-10-29

## 2019-10-29 RX ADMIN — HEPARIN SODIUM 5000 UNIT(S): 5000 INJECTION INTRAVENOUS; SUBCUTANEOUS at 06:04

## 2019-10-29 RX ADMIN — Medication 100 MILLIEQUIVALENT(S): at 09:47

## 2019-10-29 RX ADMIN — ERTAPENEM SODIUM 100 MILLIGRAM(S): 1 INJECTION, POWDER, LYOPHILIZED, FOR SOLUTION INTRAMUSCULAR; INTRAVENOUS at 21:03

## 2019-10-29 RX ADMIN — Medication 100 MILLIEQUIVALENT(S): at 12:23

## 2019-10-29 RX ADMIN — Medication 100 MILLIEQUIVALENT(S): at 10:49

## 2019-10-29 RX ADMIN — Medication 100 MILLIGRAM(S): at 06:04

## 2019-10-29 RX ADMIN — CEFTRIAXONE 100 MILLIGRAM(S): 500 INJECTION, POWDER, FOR SOLUTION INTRAMUSCULAR; INTRAVENOUS at 12:24

## 2019-10-29 RX ADMIN — HEPARIN SODIUM 5000 UNIT(S): 5000 INJECTION INTRAVENOUS; SUBCUTANEOUS at 18:30

## 2019-10-29 RX ADMIN — Medication 0.12 MILLIGRAM(S): at 06:04

## 2019-10-29 RX ADMIN — SODIUM CHLORIDE 100 MILLILITER(S): 9 INJECTION INTRAMUSCULAR; INTRAVENOUS; SUBCUTANEOUS at 10:49

## 2019-10-29 RX ADMIN — Medication 100 MILLIEQUIVALENT(S): at 21:48

## 2019-10-29 NOTE — PHYSICAL THERAPY INITIAL EVALUATION ADULT - GAIT DEVIATIONS NOTED, PT EVAL
decreased step length/decreased swing-to-stance ratio/decreased candelario/increased time in double stance/decreased weight-shifting ability/decreased velocity of limb motion/decreased stride length/shuffled steps, +buckle L knee.

## 2019-10-29 NOTE — PHYSICAL THERAPY INITIAL EVALUATION ADULT - PRECAUTIONS/LIMITATIONS, REHAB EVAL
fall precautions Continued: CXR: Low lung volumes. Platelike atelectasis left lower lobe./fall precautions

## 2019-10-29 NOTE — PROGRESS NOTE ADULT - ASSESSMENT
89 yo female with pmhx of HTN, HFpEF, chr afib who has been experiencing worsening weakness x 2 wks, s/p mech fall yesterday, CT and xrays neg  unable to ambulate  pt lives w dgtr who is unable to take care of patient at home    #Fall/weakness- unable to ambulate  PT eval  prn tylenol for pain/discomfort    #UTI: (positive UA, leucocytosis) will switch to ertepenem as wbc worsening  fu ucx, bld cx ngtd  renal sono  ID cs    #leucocytosis: ?UTI related vs other  afebrile-monitor temps, HD stable  may need rpt ct a/p if worsens    #NITA: prerenal in the setting of FTT and diuretics  hold thiazide and furosemide  hyponatremia- likely thiazide related, monitor off meds  renal sono  urine lytes    #Abdominal distension: chronic, CT reviewed  family refused any intervention as long as pt asymptomatic from it    #CHF: last echo aug 2018- normal study- normal LV systolic function  hold home diuretics in the setting of hyponatremia and NITA  resume digoxin, pending levels  mild trops d/t elev cr, ekg reviewed-sinus bakari 57    # HTN: resume metoprolol , hold lasix and dyazide    #GOC: dw dgtr and reshma on phone who spoke to pcp dr Ly and wish no aggressive measures  made pt DNR/DNI  s&S eval    [facility]  [phone] 89 yo female with pmhx of HTN, HFpEF, chr afib who has been experiencing worsening weakness x 2 wks, s/p mech fall yesterday, CT and xrays neg  unable to ambulate  pt lives w dgtr who is unable to take care of patient at home    #Fall/weakness- unable to ambulate  PT eval  prn tylenol for pain/discomfort    #UTI: (positive UA, leucocytosis) will switch to ertepenem as wbc worsening  fu ucx, bld cx ngtd  renal sono  ID cs    #leucocytosis: ?UTI related vs other  afebrile-monitor temps, HD stable  may need rpt ct a/p if worsens    #NITA: prerenal in the setting of FTT and diuretics  hold thiazide and furosemide  hyponatremia- likely thiazide related, monitor off meds  renal sono  urine lytes    #Abdominal distension: chronic, CT reviewed  family refused any intervention as long as pt asymptomatic from it    #elev BS: elev hba1c-new DM  monitor bld sugars    #CHF: last echo aug 2018- normal study- normal LV systolic function  hold home diuretics in the setting of hyponatremia and NITA  resume digoxin, pending levels  mild trops d/t elev cr, ekg reviewed-sinus bakari 57    # HTN: resume metoprolol , hold lasix and dyazide    #GOC: jaguar hanna and reshma on phone who spoke to pcp dr Ly and wish no aggressive measures  made pt DNR/DNI  s&S eval    [facility]  [phone]

## 2019-10-29 NOTE — PROGRESS NOTE ADULT - SUBJECTIVE AND OBJECTIVE BOX
Patient is a 89y old  Female who presents with a chief complaint of sp fall unable to ambulate (28 Oct 2019 12:37)      INTERVAL HPI/OVERNIGHT EVENTS: feels weak, denies any abd pain/back pain  afebrile non toxic looking  coughs w liquids  per staff  average po intake , 1normal bm yesterday,       Vital Signs Last 24 Hrs  T(C): 36.4 (29 Oct 2019 13:47), Max: 36.7 (29 Oct 2019 05:56)  T(F): 97.6 (29 Oct 2019 13:47), Max: 98.1 (29 Oct 2019 05:56)  HR: 71 (29 Oct 2019 13:47) (64 - 74)  BP: 129/85 (29 Oct 2019 13:47) (129/85 - 153/84)  BP(mean): --  RR: 20 (29 Oct 2019 13:47) (18 - 20)  SpO2: 94% (29 Oct 2019 13:47) (91% - 94%)    cefTRIAXone   IVPB      cefTRIAXone   IVPB 1000 milliGRAM(s) IV Intermittent every 24 hours  digoxin     Tablet 0.125 milliGRAM(s) Oral daily  heparin  Injectable 5000 Unit(s) SubCutaneous every 12 hours  metoprolol succinate  milliGRAM(s) Oral daily  sodium chloride 0.9%. 1000 milliLiter(s) IV Continuous <Continuous>      PHYSICAL EXAM:  GENERAL: NAD,   EYES: conjunctiva and sclera clear  ENMT: Moist mucous membranes  NECK: Supple, No JVD, Normal thyroid  NERVOUS SYSTEM:  Alert & Oriented X,   CHEST/LUNG: Clear to auscultation bilaterally; No rales, rhonchi, wheezing, or rubs  HEART: Regular rate and rhythm; No murmurs, rubs, or gallops  ABDOMEN: Soft, Nontender, distended; Bowel sounds present  EXTREMITIES:  2+ Peripheral Pulses, No clubbing, cyanosis, or edema  LYMPH: No lymphadenopathy noted  SKIN: No rashes or lesions    Consultant(s) Notes Reviewed:  [x ] YES  [ ] NO  Care Discussed with Consultants/Other Providers [ x] YES  [ ] NO    LABS:                        13.6   28.07 )-----------( 326      ( 29 Oct 2019 15:51 )             39.7     10    135  |  97  |  80<H>  ----------------------------<  177<H>  3.1<L>   |  23  |  1.83<H>    Ca    9.0      29 Oct 2019 15:51    TPro  7.8  /  Alb  3.6  /  TBili  0.8  /  DBili  x   /  AST  53<H>  /  ALT  20  /  AlkPhos  102  10-28      Urinalysis Basic - ( 28 Oct 2019 08:24 )    Color: Yellow / Appearance: Slightly Turbid / S.017 / pH: x  Gluc: x / Ketone: Negative  / Bili: Negative / Urobili: Negative   Blood: x / Protein: 30 mg/dL / Nitrite: Positive   Leuk Esterase: Large / RBC: >50 /hpf / WBC 7 /HPF   Sq Epi: x / Non Sq Epi: 8 / Bacteria: Moderate      CAPILLARY BLOOD GLUCOSE      POCT Blood Glucose.: 178 mg/dL (29 Oct 2019 17:20)        Urinalysis Basic - ( 28 Oct 2019 08:24 )    Color: Yellow / Appearance: Slightly Turbid / S.017 / pH: x  Gluc: x / Ketone: Negative  / Bili: Negative / Urobili: Negative   Blood: x / Protein: 30 mg/dL / Nitrite: Positive   Leuk Esterase: Large / RBC: >50 /hpf / WBC 7 /HPF   Sq Epi: x / Non Sq Epi: 8 / Bacteria: Moderate        Culture - Blood (collected 28 Oct 2019 16:53)  Source: .Blood  Preliminary Report (29 Oct 2019 17:01):    No growth to date.    Culture - Urine (collected 27 Oct 2019 10:18)  Source: .Urine  Final Report (28 Oct 2019 08:18):    No growth        RADIOLOGY & ADDITIONAL TESTS:    Imaging Personally Reviewed:  [x ] YES  [ ] NO

## 2019-10-29 NOTE — PHYSICAL THERAPY INITIAL EVALUATION ADULT - ADDITIONAL COMMENTS
history obtain by Dtr bedside. Pt lives in 2 family home with dtr and son in law, resides on first floor. as per dtr, pt was indep amb with Rollator and indep with all ADLs until about 2 weeks ago demonstrating progressive weakness, unable to amb. Dtr reports pt has also been "seeing things" stating there was a kitten on the windowsill of the hospital room.

## 2019-10-29 NOTE — PHYSICAL THERAPY INITIAL EVALUATION ADULT - MANUAL MUSCLE TESTING RESULTS, REHAB EVAL
Patient appears to be at baseline mental status grossly assessed due to/RLE & BUE 3/5, LLE at least 2/5

## 2019-10-29 NOTE — PHYSICAL THERAPY INITIAL EVALUATION ADULT - TRANSFER SAFETY CONCERNS NOTED: SIT/STAND, REHAB EVAL
decreased step length/stand pivot/decreased weight-shifting ability/losing balance/decreased balance during turns

## 2019-10-29 NOTE — PHYSICAL THERAPY INITIAL EVALUATION ADULT - PERTINENT HX OF CURRENT PROBLEM, REHAB EVAL
89 yo female with pmhx of HTN, CHF with worsening weakness x 2 wks, seen in ED 10/27 because she slid out of her chair onto the floor yesterday.  Pt had CT head, C-spine, XR's. Pt had mild hypokalemia and was repleted and DC'd. Family now represents to Ed because they state that they can no longer care for pt at home because she is too weak. CTAP obtained because of abdominal distention recommended possible rectal tube and surg consult. Pt and family declined this intervention.

## 2019-10-30 DIAGNOSIS — N17.9 ACUTE KIDNEY FAILURE, UNSPECIFIED: ICD-10-CM

## 2019-10-30 DIAGNOSIS — R14.0 ABDOMINAL DISTENSION (GASEOUS): ICD-10-CM

## 2019-10-30 DIAGNOSIS — D72.829 ELEVATED WHITE BLOOD CELL COUNT, UNSPECIFIED: ICD-10-CM

## 2019-10-30 DIAGNOSIS — R53.1 WEAKNESS: ICD-10-CM

## 2019-10-30 LAB
ANION GAP SERPL CALC-SCNC: 15 MMOL/L — SIGNIFICANT CHANGE UP (ref 5–17)
ANION GAP SERPL CALC-SCNC: 15 MMOL/L — SIGNIFICANT CHANGE UP (ref 5–17)
BASOPHILS # BLD AUTO: 0.05 K/UL — SIGNIFICANT CHANGE UP (ref 0–0.2)
BASOPHILS NFR BLD AUTO: 0.2 % — SIGNIFICANT CHANGE UP (ref 0–2)
BUN SERPL-MCNC: 86 MG/DL — HIGH (ref 7–23)
BUN SERPL-MCNC: 90 MG/DL — HIGH (ref 7–23)
CALCIUM SERPL-MCNC: 8.8 MG/DL — SIGNIFICANT CHANGE UP (ref 8.4–10.5)
CALCIUM SERPL-MCNC: 8.9 MG/DL — SIGNIFICANT CHANGE UP (ref 8.4–10.5)
CHLORIDE SERPL-SCNC: 99 MMOL/L — SIGNIFICANT CHANGE UP (ref 96–108)
CHLORIDE SERPL-SCNC: 99 MMOL/L — SIGNIFICANT CHANGE UP (ref 96–108)
CO2 SERPL-SCNC: 21 MMOL/L — LOW (ref 22–31)
CO2 SERPL-SCNC: 22 MMOL/L — SIGNIFICANT CHANGE UP (ref 22–31)
CREAT SERPL-MCNC: 1.85 MG/DL — HIGH (ref 0.5–1.3)
CREAT SERPL-MCNC: 1.91 MG/DL — HIGH (ref 0.5–1.3)
CULTURE RESULTS: SIGNIFICANT CHANGE UP
EOSINOPHIL # BLD AUTO: 0.02 K/UL — SIGNIFICANT CHANGE UP (ref 0–0.5)
EOSINOPHIL NFR BLD AUTO: 0.1 % — SIGNIFICANT CHANGE UP (ref 0–6)
GLUCOSE BLDC GLUCOMTR-MCNC: 149 MG/DL — HIGH (ref 70–99)
GLUCOSE BLDC GLUCOMTR-MCNC: 160 MG/DL — HIGH (ref 70–99)
GLUCOSE BLDC GLUCOMTR-MCNC: 163 MG/DL — HIGH (ref 70–99)
GLUCOSE SERPL-MCNC: 147 MG/DL — HIGH (ref 70–99)
GLUCOSE SERPL-MCNC: 155 MG/DL — HIGH (ref 70–99)
HCT VFR BLD CALC: 42.3 % — SIGNIFICANT CHANGE UP (ref 34.5–45)
HCT VFR BLD CALC: 42.9 % — SIGNIFICANT CHANGE UP (ref 34.5–45)
HGB BLD-MCNC: 14 G/DL — SIGNIFICANT CHANGE UP (ref 11.5–15.5)
HGB BLD-MCNC: 14.1 G/DL — SIGNIFICANT CHANGE UP (ref 11.5–15.5)
IMM GRANULOCYTES NFR BLD AUTO: 0.9 % — SIGNIFICANT CHANGE UP (ref 0–1.5)
LYMPHOCYTES # BLD AUTO: 2.31 K/UL — SIGNIFICANT CHANGE UP (ref 1–3.3)
LYMPHOCYTES # BLD AUTO: 8.7 % — LOW (ref 13–44)
MCHC RBC-ENTMCNC: 29.7 PG — SIGNIFICANT CHANGE UP (ref 27–34)
MCHC RBC-ENTMCNC: 30 PG — SIGNIFICANT CHANGE UP (ref 27–34)
MCHC RBC-ENTMCNC: 32.6 GM/DL — SIGNIFICANT CHANGE UP (ref 32–36)
MCHC RBC-ENTMCNC: 33.3 GM/DL — SIGNIFICANT CHANGE UP (ref 32–36)
MCV RBC AUTO: 89.2 FL — SIGNIFICANT CHANGE UP (ref 80–100)
MCV RBC AUTO: 92.1 FL — SIGNIFICANT CHANGE UP (ref 80–100)
MONOCYTES # BLD AUTO: 1.73 K/UL — HIGH (ref 0–0.9)
MONOCYTES NFR BLD AUTO: 6.5 % — SIGNIFICANT CHANGE UP (ref 2–14)
NEUTROPHILS # BLD AUTO: 22.29 K/UL — HIGH (ref 1.8–7.4)
NEUTROPHILS NFR BLD AUTO: 83.6 % — HIGH (ref 43–77)
NRBC # BLD: 0 /100 WBCS — SIGNIFICANT CHANGE UP (ref 0–0)
PLATELET # BLD AUTO: 293 K/UL — SIGNIFICANT CHANGE UP (ref 150–400)
PLATELET # BLD AUTO: 315 K/UL — SIGNIFICANT CHANGE UP (ref 150–400)
POTASSIUM SERPL-MCNC: 2.7 MMOL/L — CRITICAL LOW (ref 3.5–5.3)
POTASSIUM SERPL-MCNC: 3 MMOL/L — LOW (ref 3.5–5.3)
POTASSIUM SERPL-SCNC: 2.7 MMOL/L — CRITICAL LOW (ref 3.5–5.3)
POTASSIUM SERPL-SCNC: 3 MMOL/L — LOW (ref 3.5–5.3)
RAPID RVP RESULT: SIGNIFICANT CHANGE UP
RBC # BLD: 4.66 M/UL — SIGNIFICANT CHANGE UP (ref 3.8–5.2)
RBC # BLD: 4.74 M/UL — SIGNIFICANT CHANGE UP (ref 3.8–5.2)
RBC # FLD: 13.2 % — SIGNIFICANT CHANGE UP (ref 10.3–14.5)
RBC # FLD: 13.3 % — SIGNIFICANT CHANGE UP (ref 10.3–14.5)
SODIUM SERPL-SCNC: 135 MMOL/L — SIGNIFICANT CHANGE UP (ref 135–145)
SODIUM SERPL-SCNC: 136 MMOL/L — SIGNIFICANT CHANGE UP (ref 135–145)
SPECIMEN SOURCE: SIGNIFICANT CHANGE UP
WBC # BLD: 26.64 K/UL — HIGH (ref 3.8–10.5)
WBC # BLD: 28.18 K/UL — HIGH (ref 3.8–10.5)
WBC # FLD AUTO: 26.64 K/UL — HIGH (ref 3.8–10.5)
WBC # FLD AUTO: 28.18 K/UL — HIGH (ref 3.8–10.5)

## 2019-10-30 PROCEDURE — 76770 US EXAM ABDO BACK WALL COMP: CPT | Mod: 26

## 2019-10-30 PROCEDURE — 74176 CT ABD & PELVIS W/O CONTRAST: CPT | Mod: 26

## 2019-10-30 RX ORDER — ASCORBIC ACID 60 MG
500 TABLET,CHEWABLE ORAL DAILY
Refills: 0 | Status: DISCONTINUED | OUTPATIENT
Start: 2019-10-30 | End: 2019-11-06

## 2019-10-30 RX ORDER — POTASSIUM CHLORIDE 20 MEQ
10 PACKET (EA) ORAL
Refills: 0 | Status: COMPLETED | OUTPATIENT
Start: 2019-10-30 | End: 2019-10-30

## 2019-10-30 RX ORDER — POLYETHYLENE GLYCOL 3350 17 G/17G
17 POWDER, FOR SOLUTION ORAL DAILY
Refills: 0 | Status: DISCONTINUED | OUTPATIENT
Start: 2019-10-30 | End: 2019-11-06

## 2019-10-30 RX ORDER — SENNA PLUS 8.6 MG/1
2 TABLET ORAL AT BEDTIME
Refills: 0 | Status: DISCONTINUED | OUTPATIENT
Start: 2019-10-30 | End: 2019-11-05

## 2019-10-30 RX ADMIN — Medication 100 MILLIEQUIVALENT(S): at 23:31

## 2019-10-30 RX ADMIN — Medication 100 MILLIEQUIVALENT(S): at 00:35

## 2019-10-30 RX ADMIN — Medication 0.12 MILLIGRAM(S): at 09:12

## 2019-10-30 RX ADMIN — HEPARIN SODIUM 5000 UNIT(S): 5000 INJECTION INTRAVENOUS; SUBCUTANEOUS at 05:08

## 2019-10-30 RX ADMIN — Medication 100 MILLIEQUIVALENT(S): at 09:00

## 2019-10-30 RX ADMIN — Medication 100 MILLIEQUIVALENT(S): at 21:41

## 2019-10-30 RX ADMIN — Medication 100 MILLIEQUIVALENT(S): at 22:39

## 2019-10-30 RX ADMIN — HEPARIN SODIUM 5000 UNIT(S): 5000 INJECTION INTRAVENOUS; SUBCUTANEOUS at 18:00

## 2019-10-30 RX ADMIN — Medication 500 MILLIGRAM(S): at 11:41

## 2019-10-30 RX ADMIN — Medication 100 MILLIEQUIVALENT(S): at 11:37

## 2019-10-30 RX ADMIN — Medication 1 TABLET(S): at 11:40

## 2019-10-30 RX ADMIN — Medication 100 MILLIEQUIVALENT(S): at 05:39

## 2019-10-30 RX ADMIN — Medication 100 MILLIGRAM(S): at 05:09

## 2019-10-30 RX ADMIN — Medication 100 MILLIEQUIVALENT(S): at 10:10

## 2019-10-30 NOTE — SWALLOW BEDSIDE ASSESSMENT ADULT - SWALLOW EVAL: DIAGNOSIS
89 yo female with pmhx of HTN and CHF admitted for weakness and found to have leukocytosis and positive UA. Swallow consult requested as attending MD recorded pt coughs with liquids. On exam today pt is alert and awake and cooperative. The swallow was marked by impaired oral management with chewables, reduced ability to siphon fluid from a straw, and a suspected delay in the trigger of the pharyngeal swallow. No overt s/s of laryngeal penetration or aspiration observed on exam. RR noted to be 25. Given oral deficits and RR, would recommend a pureed diet at this time. Patient should be closely monitored for diet tolerance. This service will continue to follow.

## 2019-10-30 NOTE — SWALLOW BEDSIDE ASSESSMENT ADULT - PHARYNGEAL PHASE
Decreased laryngeal elevation Decreased laryngeal elevation/Delayed pharyngeal swallow Delayed pharyngeal swallow/Decreased laryngeal elevation

## 2019-10-30 NOTE — CONSULT NOTE ADULT - PROBLEM SELECTOR RECOMMENDATION 9
in [place], pt has had leukocytosis for 2 years with only one normal wbc noted  blood cx neg  no pyuria-- urine cx neg  cxr -- without infiltrate  sacral wounds do not look infected  had loose stool today-- if persists, send for cdiff, stool cx and pcr  d/c ertapenem  wound care consult for local care

## 2019-10-30 NOTE — DIETITIAN INITIAL EVALUATION ADULT. - NS FNS REASON FOR WEIGHT CHANG
other (specify)/may be related to age-related sarcopenia decreased po intake/in setting of increased calorie and protein needs

## 2019-10-30 NOTE — CONSULT NOTE ADULT - PROBLEM SELECTOR RECOMMENDATION 2
ct scan with bowel dilation  if diarrhea persists -- send stool studies  per daughter pt with chronically distended abd  for repeat imaging

## 2019-10-30 NOTE — DIETITIAN INITIAL EVALUATION ADULT. - OTHER INFO
Pt reported poor appetite with <50% PO intakes.  Pt reports no difficulty chewing.  Per 10/29/19 MD note, pt coughs with liquids; recommend NP to consider speech and swallow evaluation.  Pt has no c/o N+V, but reports constipation; last BM 10/28 with firm rigid stools per nurse chart.  Pt states that she has an allergy to mushrooms after one occasion of getting sick from eating it.      Usual food intakes include a variety of carbohydrate sources (cereal, sandwich); noted lack of protein in diet history.  Pt lives with daughter at home, who often brings her food from outside restaurants ([employer]) for dinner.    Per previous RD note and hospitalist note (10/29/17), pt's A1c was 8.0-8.1%.  Pt's current A1c 7.8% (10/29/19); may be related to pt's carbohydrate heavy diet PTA.  A1c value is within acceptable range given pt's elderly status. Pt reported poor appetite with <50% PO intakes.  Pt states that she hasn't been eating in the past 1-2 weeks.  Pt reports no difficulty chewing.  Per 10/29/19 MD note, pt coughs with liquids.  Pt is currently on no liquid diet; is receiving IV fluids.  Pending recommendations from 10/30 speech and swallow evaluation.  Pt has no c/o N+V, but reports constipation; last BM 10/28 with firm rigid stools per nurse chart.  Pt avoids mushrooms after one occasion of getting sick from eating it; will note in [facility]ORD preferences.      Usual food intakes include a variety of carbohydrate sources (cereal, sandwich); noted lack of protein in diet history.  Pt lives with daughter at home, who often brings her food from outside restaurants ([employer]) for dinner.    Per previous RD note and hospitalist note (10/29/17), pt's A1c was 8.0-8.1%.  Pt's current A1c 7.8% (10/29/19); may be related to pt's carbohydrate heavy diet PTA.  A1c value is within acceptable range given pt's elderly status.

## 2019-10-30 NOTE — DIETITIAN INITIAL EVALUATION ADULT. - FACTORS AFF FOOD INTAKE
change in mental status/difficulty feeding self/difficulty swallowing/persistent lack of appetite difficulty feeding self/persistent lack of appetite/change in mental status/difficulty swallowing/Per nurse chart, pt is confused at times

## 2019-10-30 NOTE — PROGRESS NOTE ADULT - ASSESSMENT
89 yo female with pmhx of HTN, HFpEF, chr afib who has been experiencing worsening weakness x 2 wks, s/p mech fall yesterday, CT and xrays neg  unable to ambulate  pt lives w dgtr who is unable to take care of patient at home    #Fall/weakness- unable to ambulate  PT eval  prn tylenol for pain/discomfort    #UTI: (positive UA, leucocytosis) will switch to ertepenem as wbc worsening  fu ucx, bld cx ngtd  renal sono  ID cs    #leucocytosis: ?UTI related vs other  afebrile-monitor temps, HD stable  may need rpt ct a/p if worsens    #NITA: prerenal in the setting of FTT and diuretics  hold thiazide and furosemide  hyponatremia- likely thiazide related, monitor off meds  renal sono  urine lytes    #Abdominal distension: chronic, CT reviewed  family refused any intervention as long as pt asymptomatic from it    #elev BS: elev hba1c-new DM  monitor bld sugars    #CHF: last echo aug 2018- normal study- normal LV systolic function  hold home diuretics in the setting of hyponatremia and NITA  resume digoxin, pending levels  mild trops d/t elev cr, ekg reviewed-sinus bakari 57    # HTN: resume metoprolol , hold lasix and dyazide    #GOC: jaguar hanna and reshma on phone who spoke to pcp dr Ly and wish no aggressive measures  made pt DNR/DNI  s&S eval    [facility]  [phone] 89 yo female with pmhx of HTN, HFpEF, chr afib who has been experiencing worsening weakness x 2 wks, s/p mech fall yesterday, CT and xrays neg  unable to ambulate  pt lives w dgtr who is unable to take care of patient at home    #Fall/weakness - unable to ambulate  PT eval  prn tylenol for pain/discomfort  s&S eval    # leukocytosis  appreciate ID recs  will observe off ABX  urine and blood cx neg  prior CT showed colonic distention and family did not want aggressive intervention  repeat CT abd pelvis with po contrast as dw dtr for worsening leukocytosis    #NITA: prerenal in the setting of FTT and diuretics  hold thiazide and furosemide  hyponatremia- likely thiazide related, monitor off meds  renal sono  renal consult    #elev BS, new DM  elev hba1c 7.8  monitor bld sugars    #CHF  last echo aug 2018- normal study- normal LV systolic function  hold home diuretics in the setting of hyponatremia and NITA  resume digoxin  mild trops d/t elev cr, ekg reviewed-sinus bakari 57    # HTN  resume metoprolol, hold lasix and dyazide    #GOC - dw dgtr and no aggressive measures  made pt DNR/DNI    [facility]  [phone]

## 2019-10-30 NOTE — CONSULT NOTE ADULT - ASSESSMENT
89 yo F w/ PMH HTN, CHF, PxAfib who presented for evaluation of fall 10/29/19. Found to have marked bowel distension and NITA. Nephrology is consulted for NITA management.    INTA  - baseline Cr in 2018 is 1.1  - admitted w/ elevated Cr  - NITA 2/2 urinary obstruction    - insert macedo catheter  - Avoid nephrotoxins including NSAIDs, IV contrast (if possible), [employer] products  - maintain MAP >65    Hypokalemia  - ? etiology for weakness  - poor PO intake by history from pt  - supplement as needed  - monitor closely after post-obstructive diuresis as it will decline    - repeat BMP this evening after repletion and placing macedo catheter    UTI  - has UTI by Hx (intermittent dysuria) and clinical findings  - recommend ceftriaxone until cx results    proteinuria/hematuria  - likely due to UTI as this is new for her since admission  - repeat UA after Abx completed

## 2019-10-30 NOTE — STUDENT SIGN OFF DOCUMENT - DOCUMENTS STUDENTS ARE SIGNED OFF ON
Vital Signs/Plan of Care/Input and Output/Assessment and Intervention Provider Contact Note/Swallow Bedside Assessment/Dietitian Initial Evaluation/Patient Profile

## 2019-10-30 NOTE — DIETITIAN INITIAL EVALUATION ADULT. - ENERGY NEEDS
Pertinent info: 89 year old female admitted for worsening weakness over past 2 weeks s/p fall.  Per H&P, PMH of HTN, HLD and atrial fibrillation; currently being treated for fall/weakness, UTI, CHF, HTN.  Diuretics on hold in response to hyponatremia (related to FTT) and hypokalemia.      Was previously seen by RD [facility] on October 2017. Diagnosed with DM2 in previous hospital admission per endocrinology note (10/30/17); pt not on insulin PTA per current H&P (10/28/19). Pertinent info: 89 year old female admitted for worsening weakness over past 2 weeks s/p fall.  Per H&P, PMH of HTN, HLD and atrial fibrillation; currently being treated for fall/weakness, UTI, CHF, HTN, FTT.  Diuretics on hold in response to hypokalemia.      Was previously seen by RD in [facility] on October 2017. Diagnosed with DM2 in previous hospital admission per endocrinology note (10/30/17); pt not on insulin PTA per current H&P (10/28/19). Pertinent info: 89 year old female admitted for worsening weakness over past 2 weeks s/p fall.  Per H&P, PMH of HTN, HLD and atrial fibrillation; currently being treated for fall/weakness, UTI, CHF, HTN, FTT.  Diuretics on hold in response to hypokalemia.      Per nursing documentation, stage II pressure injury on left buttock; +1 edema b/l ankles.    Was previously seen by RD in [facility] on October 2017. Diagnosed with DM2 in previous hospital admission per endocrinology note (10/30/17); pt not on insulin PTA per current H&P (10/28/19). Pertinent info: 89 year old female admitted for worsening weakness over past 2 weeks s/p fall.  Per H&P, PMH of HTN, HLD and atrial fibrillation; currently being treated for fall/weakness, UTI, CHF, HTN, FTT.  Diuretics on hold in response to hypokalemia.      Per nursing documentation, stage II pressure injury on left buttock and sacrum; +1 edema b/l ankles.    Was previously seen by RD in [facility] on October 2017. Diagnosed with DM2 in previous hospital admission per endocrinology note (10/30/17); pt not on insulin PTA per current H&P (10/28/19).

## 2019-10-30 NOTE — DIETITIAN INITIAL EVALUATION ADULT. - ADD RECOMMEND
Recommend NP to consider daily multivitamin and vitamin C for noted pressure ulcer injury.  Suggest Ensure Pudding TID to supplement pt's increased protein needs (related to pressure ulcer and age-related muscle loss).  Recommend NP to consider speech and swallow evaluation.  Monitor PO intakes, tolerance to diet prescription, weights, and labs.  Pt made aware that RD will remain available. Recommend NP to consider daily multivitamin and vitamin C for noted pressure ulcer injury.  Suggest Ensure Pudding TID to supplement pt's increased protein needs (related to pressure ulcer and age-related muscle loss).  RD remains available to make supplement recommendations pending speech and swallow results.  Monitor PO intakes, tolerance to diet prescription, weights, and labs.  Pt made aware that RD will remain available.

## 2019-10-30 NOTE — DIETITIAN INITIAL EVALUATION ADULT. - ETIOLOGY
Pt's increased protein needs related to age-related sarcopenia Inadequate energy and protein intake in setting of increased calorie and protein needs Inadequate energy and protein intake in setting of increased protein needs

## 2019-10-30 NOTE — SWALLOW BEDSIDE ASSESSMENT ADULT - SLP PERTINENT HISTORY OF CURRENT PROBLEM
89 yo female with pmhx of HTN, CHF who has been experiencing worsening weakness x 2 wks, seen in ED yesterday because she slid out of her chair onto the floor yesterday.  Pt had CT head, C-spine, XR's.  Pt had mild hypokalemia and was repleted and discharged.  Family now represents to Ed because they state that they can no longer care for pt at home because she is too weak for them to help her to the bathroom. Pt's CTAP yesterday obtained because of abdominal distention recommended possible rectal tube and surg consult. Pt and family declined this intervention.

## 2019-10-30 NOTE — CONSULT NOTE ADULT - SUBJECTIVE AND OBJECTIVE BOX
[facility]   MD Ivett Werner D.O. Fatima Sheikh, D.O. Ruoru Wong, PA    From 7 AM - 5 PM:  OFFICE: [phone]  Dr. Reyes cell: [phone]  Dr. Crouch cell: [phone]  Dr. Dumont cell: [phone]  CIERRA Vargas cell: [phone]    From 5 PM - 7 AM: Answering Service: [phone]      -- INITIAL RENAL CONSULT NOTE  --------------------------------------------------------------------------------  HPI: Ms. Obrien is a 89 yo F w/ PMH HTN, CHF, PxAfib who presented for evaluation of fall yesterday. Pt states she fell off a chair yesterday, she did not notice it was broken and she fell off. Has no complaints at this time except for occasional dysuria. No fevers or chills. Denies any prior renal disease or seeing a nephrologist previously. Per chart, pt was brought in for admission as the family is unable to care for her due to weakness.        PAST HISTORY  --------------------------------------------------------------------------------  PAST MEDICAL & SURGICAL HISTORY:  Heart failure  Paroxysmal atrial fibrillation  HTN (hypertension)  CHF (congestive heart failure)  No significant past surgical history  History of appendectomy    FAMILY HISTORY:  No pertinent family history in first degree relatives  Family history of throat cancer (Sibling)    PAST SOCIAL HISTORY:    ALLERGIES & MEDICATIONS  --------------------------------------------------------------------------------  Allergies    No Known Allergies    Intolerances      Standing Inpatient Medications  ascorbic acid 500 milliGRAM(s) Oral daily  digoxin     Tablet 0.125 milliGRAM(s) Oral daily  heparin  Injectable 5000 Unit(s) SubCutaneous every 12 hours  metoprolol succinate  milliGRAM(s) Oral daily  multivitamin 1 Tablet(s) Oral daily  senna 2 Tablet(s) Oral at bedtime  sodium chloride 0.9%. 1000 milliLiter(s) IV Continuous <Continuous>    PRN Inpatient Medications  polyethylene glycol 3350 17 Gram(s) Oral daily PRN      REVIEW OF SYSTEMS  --------------------------------------------------------------------------------  Gen: No fevers/chills  Skin: No rashes  Head/Eyes/Ears: Normal hearing,  Normal vision   Respiratory: No dyspnea, cough  CV: No chest pain  GI: No abdominal pain, diarrhea, constipation, nausea, vomiting  : + dysuria. no hematuria  MSK: No  edema  Heme: No easy bruising or bleeding  Psych: No significant depression    All other systems were reviewed and are negative, except as noted.    VITALS/PHYSICAL EXAM  --------------------------------------------------------------------------------  T(C): 36.6 (10-30-19 @ 05:52), Max: 36.7 (10-29-19 @ 21:04)  HR: 59 (10-30-19 @ 09:07) (55 - 67)  BP: 139/80 (10-30-19 @ 09:07) (120/72 - 141/84)  RR: 25 (10-30-19 @ 12:35) (18 - 25)  SpO2: 98% (10-30-19 @ 12:35) (95% - 99%)  Wt(kg): --        10-29-19 @ 07:01  -  10-30-19 @ 07:00  --------------------------------------------------------  IN: 2250 mL / OUT: 0 mL / NET: 2250 mL    10-30-19 @ 07:01  -  10-30-19 @ 18:25  --------------------------------------------------------  IN: 300 mL / OUT: 0 mL / NET: 300 mL      Physical Exam:  	Gen: NAD  	HEENT: MM dry  	Pulm: CTA B/L  	CV: S1S2, + murmur  	Abd: Soft, +BS, Very distended abdomen   	Ext: No LE edema B/L  	Neuro: Awake  	Skin: Warm and dry      LABS/STUDIES  --------------------------------------------------------------------------------              14.1   28.18 >-----------<  315      [10-30-19 @ 16:55]              42.3     135  |  99  |  90  ----------------------------<  155      [10-30-19 @ 16:55]  3.0   |  21  |  1.85        Ca     8.9     [10-30-19 @ 16:55]      Mg     2.6     [10-29-19 @ 15:51]            Creatinine Trend:  SCr 1.85 [10-30 @ 16:55]  SCr 1.91 [10-30 @ 06:40]  SCr 1.83 [10-29 @ 15:51]  SCr 1.67 [10-29 @ 06:16]  SCr 1.65 [10-28 @ 06:14]    Urinalysis - [10-28-19 @ 08:24]      Color Yellow / Appearance Slightly Turbid / SG 1.017 / pH 8.5      Gluc Negative / Ketone Negative  / Bili Negative / Urobili Negative       Blood Moderate / Protein 30 mg/dL / Leuk Est Large / Nitrite Positive      RBC >50 / WBC 7 / Hyaline 0 / Gran  / Sq Epi  / Non Sq Epi 8 / Bacteria Moderate      HbA1c 7.8      [10-29-19 @ 13:12]      EXAM:  US KIDNEYS AND BLADDER                            PROCEDURE DATE:  10/30/2019            INTERPRETATION:  CLINICAL INFORMATION: Acute kidney injury.    COMPARISON: 10/27/2019    TECHNIQUE: Sonography of the kidneys and bladder.     FINDINGS:    Right kidney:  9 cm. Moderate hydronephrosis. Small cyst.    Left kidney:  9 cm. No renal mass, hydronephrosis or calculi. Multiple   cysts.    Urinary bladder: The bladder volume is 950 mL. The patient is unable to   void.    IMPRESSION:     Moderate right hydronephrosis.    Distended bladder.                    VERITO FAY M.D., ATTENDING RADIOLOGIST  This document has been electronically signed. Oct 30 2019  3:24PM

## 2019-10-30 NOTE — CHART NOTE - NSCHARTNOTEFT_GEN_A_CORE
Upon Nutritional Assessment by the Registered Dietitian your patient was determined to meet criteria / has evidence of the following diagnosis/diagnoses:          [ ]  Mild Protein Calorie Malnutrition        [x ]  Moderate Protein Calorie Malnutrition        [ ] Severe Protein Calorie Malnutrition        [ ] Unspecified Protein Calorie Malnutrition        [ ] Underweight / BMI <19        [ ] Morbid Obesity / BMI > 40      Findings as based on:  [x ] Comprehensive nutrition assessment   [ ] Nutrition Focused Physical Exam  [ x] Other: poor appetite with <50% PO intakes x 2 weeks, 13% weight loss in >6 months      Nutrition Plan/Recommendations:   consider daily multivitamin and vitamin C for noted pressure ulcer injury.  Suggest Ensure Pudding TID to supplement pt's increased protein needs         PROVIDER Section:     By signing this assessment you are acknowledging and agree with the diagnosis/diagnoses assigned by the Registered Dietitian    Comments:

## 2019-10-30 NOTE — DIETITIAN INITIAL EVALUATION ADULT. - PHYSICAL APPEARANCE
Height: 5'0", Weight: 130 lbs (10/29), BMI: 25.4, IBW: 100 lbs +/- 10%/underweight/other (specify) Pt agreed to nutrition focused physical exam; exam was performed with RD.  Physical findings include:     Fat loss: moderate (orbitals, buccals, upper arm region)  Muscle loss: severe (clavicles, shoulders, scapula), moderate (interosseous; may be age-related) Pt agreed to nutrition focused physical exam; exam was performed with RD.  Physical findings include:     Fat loss: moderate (orbitals, buccals, upper arm region)  Muscle loss: severe (clavicles, shoulders, scapula), moderate (interosseous; may be age-related)  Noted abdominal distention per interview observation

## 2019-10-30 NOTE — CONSULT NOTE ADULT - SUBJECTIVE AND OBJECTIVE BOX
[facility], Division of Infectious Diseases  ASTER Farr A. Lee  [phone]    EFRAIN GAMA  89y, Female  [id number]  HPI: pt is not a very good historian, history per daughter who pt lives with  89 yo female with pmhx of HTN, CHF who has been experiencing worsening weakness x 2 wks, seen in ED yesterday because she slid out of her chair onto the floor yesterday.    Per daughter, pt was able to ambulate with a walker and do most adl, and was able to have normal conversations.  Now has been slurring her words and could not get up or walk.  NO cough, no fever, no chills, no vomiting, no sick contacts, no recent antibx, no recent procedures.  per nursing had 3 loose watery stool this morning      PMH/PSH--  Heart failure  Paroxysmal atrial fibrillation  HTN (hypertension)  CHF (congestive heart failure)  History of appendectomy        Allergies--NKDA      Medications--  Antibiotics: ertapenem  IVPB 500 milliGRAM(s) IV Intermittent every 24 hours  ertapenem  IVPB        Immunologic:   Other: ascorbic acid  digoxin     Tablet  heparin  Injectable  metoprolol succinate ER  multivitamin  polyethylene glycol 3350 PRN  potassium chloride  10 mEq/100 mL IVPB  senna  sodium chloride 0.9%.      Social History--  EtOH: denies ***  Tobacco: denies ***  Drug Use: denies ***    Family/Marital History--  Family history of throat cancer (Sibling)    lives with daughter    Remainder not relevant to clinical concern.    Travel/Environmental/Occupational History:  was a homemaker    Review of Systems:  A >=10-point review of systems was obtained.     unable to obtain   Review of systems otherwise negative except as previously noted.    Physical Exam--  Vital Signs: T(F): 97.8 (10-30-19 @ 05:52), Max: 98 (10-29-19 @ 21:04)  HR: 59 (10-30-19 @ 09:07)  BP: 139/80 (10-30-19 @ 09:07)  RR: 18 (10-30-19 @ 09:07)  SpO2: 99% (10-30-19 @ 09:07)  Wt(kg): --  General: Nontoxic-appearing Female in no acute distress.  HEENT: AT/NC. +NC  Neck: Not rigid. No sense of mass.  Nodes: None palpable.  Lungs: Clear bilaterally without rales, wheezing or rhonchi  Heart: distant heart sounds s1s2  Abdomen: Bowel sounds present and normoactive. firm. distended. Nontender.   Back: No spinal tenderness. No costovertebral angle tenderness.   Extremities: No cyanosis or clubbing. trace edema. dermatitis   back;; decubitus clean-- no discharge, no surrounding cellulitis  :  hemorrhoids, ? rectal prolapse  Skin: Warm. Dry. Good turgor. No rash. No vasculitic stigmata.  Psychiatric: mild confusion        Laboratory & Imaging Data--  CBC                        14.0   26.64 )-----------( 293      ( 30 Oct 2019 09:15 )             42.9       Chemistries  10-30    136  |  99  |  86<H>  ----------------------------<  147<H>  2.7<LL>   |  22  |  1.91<H>    Ca    8.8      30 Oct 2019 06:40  Mg     2.6     10-29        Culture Data    Culture - Urine (collected 29 Oct 2019 06:12)  Source: .Urine  Final Report (30 Oct 2019 07:23):    <10,000 CFU/mL Normal Urogenital [place]    Culture - Blood (collected 28 Oct 2019 16:53)  Source: .Blood  Preliminary Report (29 Oct 2019 17:01):    No growth to date.    Culture - Urine (collected 27 Oct 2019 10:18)  Source: .Urine  Final Report (28 Oct 2019 08:18):    No growth    < from: Xray Chest 1 View- PORTABLE-Urgent (10.28.19 @ 07:23) >    EXAM:  XR CHEST PORTABLE URGENT 1V                            PROCEDURE DATE:  10/28/2019            INTERPRETATION:  CLINICAL INFORMATION: Generalized weakness. Admission   x-ray.    TECHNIQUE: Frontal view of chest.        COMPARISON: Chest radiograph 8/20/2018    FINDINGS:    No pleural effusion or pneumothorax.  Heart size normal.  Low lung volumes. Platelike atelectasis left lower lobe..  No acute osseous abnormality.    IMPRESSION:     Low lung volumes. Platelike atelectasis left lower lobe.    < end of copied text >    Urinalysis + Microscopic Examination (10.28.19 @ 08:24)    Urine Appearance: Slightly Turbid    Specific Gravity: 1.017    Protein, Urine: 30 mg/dL    Urobilinogen: Negative    pH Urine: 8.5    Leukocyte Esterase Concentration: Large    Nitrite: Positive    Ketone - Urine: Negative    Bilirubin: Negative    Color: Yellow    Blood, Urine: Moderate    Glucose Qualitative, Urine: Negative    Red Blood Cell - Urine: >50 /hpf    White Blood Cell - Urine: 7: verified by microscopic /HPF    Epithelial Cells: 8    Hyaline Casts: 0 /lpf    Triple Phosphate Crystals: Many    Bacteria: Moderate    < from: CT Abdomen and Pelvis w/ Oral Cont (10.27.19 @ 09:42) >    EXAM:  CT ABDOMEN AND PELVIS OC                            PROCEDURE DATE:  10/27/2019            INTERPRETATION:  CLINICAL INFORMATION: Abdominal distention    COMPARISON: None.    PROCEDURE:   CT of the Abdomen and Pelvis was performed without intravenous contrast.   Intravenous contrast: None.  Oral contrast: positive contrast was administered.  Sagittal and coronal reformats were performed.    FINDINGS:    LOWER CHEST: Bibasilar subsegmental atelectasis. Coronary artery   calcifications.    Evaluation of the parenchymal organs and vascular structures is limited   without intravenous contrast.    LIVER: Within normal limits.  BILE DUCTS: Normal caliber.  GALLBLADDER: Within normal limits.  SPLEEN: Within normal limits.  PANCREAS: Within normal limits.  ADRENALS: Within normal limits.  KIDNEYS/URETERS: Left renal cyst noted.    BLADDER: Within normal limits.  REPRODUCTIVE ORGANS: The uterus is normal. There is no adnexal mass.    BOWEL: Markedly distended sigmoid, descending, and transverse colon,   measuring up to 11.0 cm. No evidence of pneumatosis, free intraperitoneal   air.     PERITONEUM: No ascites.    VESSELS: Calcific atherosclerosis of the aorta and its branches.    RETROPERITONEUM/LYMPH NODES: No lymphadenopathy.      ABDOMINAL WALL: Mild subcutaneous edema.    BONES: Degenerative changes of the spine. Chronic compression deformity   of T11-T12. Grade 1 anterolisthesis of L3 on L4. Scoliosis. Chronic   deformity of the right  hip.    IMPRESSION:     Marked distention of the colon without definite transition point or   evidence of sigmoid volvulus. This appears slightly worse than on   previous pelvic x-ray of 2015 and may reflect chronic bowel dilatation.      < end of copied text >        < from: CT Head No Cont (10.27.19 @ 09:47) >    IMPRESSION: Volume loss, microvascular disease. No CT evidence of acute   intracranial hemorrhage. Bony irregularity with possible nondisplaced   fracture of the anterior bony left frontalsinus, with left frontal sinus   and anterior ethmoidal opacification and bubbly secretions, possible   irregularity of the left cribriform plate, with limitations assessment by   motion, repeat thin section maxillofacial CT may better assess as   clinically warranted.    < end of copied text > [facility], Division of Infectious Diseases  ASTER Farr A. Lee  [phone]    EFRAIN GAMA  89y, Female  [id number]  HPI: pt is not a very good historian, history per daughter who pt lives with  89 yo female with pmhx of HTN, CHF who has been experiencing worsening weakness x 2 wks, seen in ED yesterday because she slid out of her chair onto the floor yesterday.    Per daughter, pt was able to ambulate with a walker and do most adl, and was able to have normal conversations.  Now has been slurring her words and could not get up or walk.  NO cough, no fever, no chills, no vomiting, no sick contacts, no recent antibx, no recent procedures.  per nursing had 3 loose watery stool this morning      PMH/PSH--  Heart failure  Paroxysmal atrial fibrillation  HTN (hypertension)  CHF (congestive heart failure)  History of appendectomy        Allergies--NKDA      Medications--  Antibiotics: ertapenem  IVPB 500 milliGRAM(s) IV Intermittent every 24 hours  ertapenem  IVPB        Immunologic:   Other: ascorbic acid  digoxin     Tablet  heparin  Injectable  metoprolol succinate ER  multivitamin  polyethylene glycol 3350 PRN  potassium chloride  10 mEq/100 mL IVPB  senna  sodium chloride 0.9%.      Social History--  EtOH: denies ***  Tobacco: denies ***  Drug Use: denies ***    Family/Marital History--  Family history of throat cancer (Sibling)    lives with daughter    Remainder not relevant to clinical concern.    Travel/Environmental/Occupational History:  was a homemaker    Review of Systems:  A >=10-point review of systems was obtained.     unable to obtain   Review of systems otherwise negative except as previously noted.    Physical Exam--  Vital Signs: T(F): 97.8 (10-30-19 @ 05:52), Max: 98 (10-29-19 @ 21:04)  HR: 59 (10-30-19 @ 09:07)  BP: 139/80 (10-30-19 @ 09:07)  RR: 18 (10-30-19 @ 09:07)  SpO2: 99% (10-30-19 @ 09:07)  Wt(kg): --  General: Nontoxic-appearing Female in mild distress.  HEENT: AT/NC. +NC  Neck: Not rigid. No sense of mass.  Nodes: None palpable.  Lungs: Clear bilaterally without rales, wheezing or rhonchi  Heart: distant heart sounds s1s2  Abdomen: Bowel sounds present and normoactive. firm. distended. Nontender.   Back: No spinal tenderness. No costovertebral angle tenderness.   Extremities: No cyanosis or clubbing. trace edema. dermatitis   back;; decubitus clean-- no discharge, no surrounding cellulitis  :  hemorrhoids, ? rectal prolapse  Skin: Warm. Dry. Good turgor. No rash. No vasculitic stigmata.  Psychiatric: mild confusion        Laboratory & Imaging Data--  CBC                        14.0   26.64 )-----------( 293      ( 30 Oct 2019 09:15 )             42.9       Chemistries  10-30    136  |  99  |  86<H>  ----------------------------<  147<H>  2.7<LL>   |  22  |  1.91<H>    Ca    8.8      30 Oct 2019 06:40  Mg     2.6     10-29        Culture Data    Culture - Urine (collected 29 Oct 2019 06:12)  Source: .Urine  Final Report (30 Oct 2019 07:23):    <10,000 CFU/mL Normal Urogenital [place]    Culture - Blood (collected 28 Oct 2019 16:53)  Source: .Blood  Preliminary Report (29 Oct 2019 17:01):    No growth to date.    Culture - Urine (collected 27 Oct 2019 10:18)  Source: .Urine  Final Report (28 Oct 2019 08:18):    No growth    < from: Xray Chest 1 View- PORTABLE-Urgent (10.28.19 @ 07:23) >    EXAM:  XR CHEST PORTABLE URGENT 1V                            PROCEDURE DATE:  10/28/2019            INTERPRETATION:  CLINICAL INFORMATION: Generalized weakness. Admission   x-ray.    TECHNIQUE: Frontal view of chest.        COMPARISON: Chest radiograph 8/20/2018    FINDINGS:    No pleural effusion or pneumothorax.  Heart size normal.  Low lung volumes. Platelike atelectasis left lower lobe..  No acute osseous abnormality.    IMPRESSION:     Low lung volumes. Platelike atelectasis left lower lobe.    < end of copied text >    Urinalysis + Microscopic Examination (10.28.19 @ 08:24)    Urine Appearance: Slightly Turbid    Specific Gravity: 1.017    Protein, Urine: 30 mg/dL    Urobilinogen: Negative    pH Urine: 8.5    Leukocyte Esterase Concentration: Large    Nitrite: Positive    Ketone - Urine: Negative    Bilirubin: Negative    Color: Yellow    Blood, Urine: Moderate    Glucose Qualitative, Urine: Negative    Red Blood Cell - Urine: >50 /hpf    White Blood Cell - Urine: 7: verified by microscopic /HPF    Epithelial Cells: 8    Hyaline Casts: 0 /lpf    Triple Phosphate Crystals: Many    Bacteria: Moderate    < from: CT Abdomen and Pelvis w/ Oral Cont (10.27.19 @ 09:42) >    EXAM:  CT ABDOMEN AND PELVIS OC                            PROCEDURE DATE:  10/27/2019            INTERPRETATION:  CLINICAL INFORMATION: Abdominal distention    COMPARISON: None.    PROCEDURE:   CT of the Abdomen and Pelvis was performed without intravenous contrast.   Intravenous contrast: None.  Oral contrast: positive contrast was administered.  Sagittal and coronal reformats were performed.    FINDINGS:    LOWER CHEST: Bibasilar subsegmental atelectasis. Coronary artery   calcifications.    Evaluation of the parenchymal organs and vascular structures is limited   without intravenous contrast.    LIVER: Within normal limits.  BILE DUCTS: Normal caliber.  GALLBLADDER: Within normal limits.  SPLEEN: Within normal limits.  PANCREAS: Within normal limits.  ADRENALS: Within normal limits.  KIDNEYS/URETERS: Left renal cyst noted.    BLADDER: Within normal limits.  REPRODUCTIVE ORGANS: The uterus is normal. There is no adnexal mass.    BOWEL: Markedly distended sigmoid, descending, and transverse colon,   measuring up to 11.0 cm. No evidence of pneumatosis, free intraperitoneal   air.     PERITONEUM: No ascites.    VESSELS: Calcific atherosclerosis of the aorta and its branches.    RETROPERITONEUM/LYMPH NODES: No lymphadenopathy.      ABDOMINAL WALL: Mild subcutaneous edema.    BONES: Degenerative changes of the spine. Chronic compression deformity   of T11-T12. Grade 1 anterolisthesis of L3 on L4. Scoliosis. Chronic   deformity of the right  hip.    IMPRESSION:     Marked distention of the colon without definite transition point or   evidence of sigmoid volvulus. This appears slightly worse than on   previous pelvic x-ray of 2015 and may reflect chronic bowel dilatation.      < end of copied text >        < from: CT Head No Cont (10.27.19 @ 09:47) >    IMPRESSION: Volume loss, microvascular disease. No CT evidence of acute   intracranial hemorrhage. Bony irregularity with possible nondisplaced   fracture of the anterior bony left frontalsinus, with left frontal sinus   and anterior ethmoidal opacification and bubbly secretions, possible   irregularity of the left cribriform plate, with limitations assessment by   motion, repeat thin section maxillofacial CT may better assess as   clinically warranted.    < end of copied text >

## 2019-10-30 NOTE — SWALLOW BEDSIDE ASSESSMENT ADULT - SLP GENERAL OBSERVATIONS
Pt encountered supine in bed, 2L O2 via nasal cannula, RR 25 (NP made aware), Pt encountered supine in bed, 2L O2 via nasal cannula, RR 25 (NP made aware), able to follow commands and relay wants and needs.

## 2019-10-30 NOTE — DIETITIAN INITIAL EVALUATION ADULT. - REASON INDICATOR FOR ASSESSMENT
Pt initial assessment for consult; Seen for stage 2 pressure injury on left buttock per nurse chart    89 year old female admitted for worsening weakness over past 2 weeks s/p fall; was previously seen by RD in [facility] on October 2017    Source: Patient, Medical Record, Nurse, Previous H&P (10/28/17), Previous RD note (10/29/17) Pt initial assessment for consult; Seen for stage 2 pressure injury on left buttock per nurse chart    Source: Patient, Medical Record, Nurse, Previous H&P (10/28/17), Previous RD note (10/29/17), Previous endocrinology note (10/30/17)

## 2019-10-30 NOTE — SWALLOW BEDSIDE ASSESSMENT ADULT - ORAL PREPARATORY PHASE
reduced stripping of utensil difficulty generating intraoral pressure to siphon liquid from straw prolonged (>60sec) mastication

## 2019-10-30 NOTE — SWALLOW BEDSIDE ASSESSMENT ADULT - ADDITIONAL RECOMMENDATIONS
Maintain good oral hygiene to reduce oral pathogens.   This service will continue to follow to assess candidacy for diet upgrade when medically optimized.

## 2019-10-30 NOTE — PROGRESS NOTE ADULT - SUBJECTIVE AND OBJECTIVE BOX
Patient is a 89y old  Female who presents with a chief complaint of sp fall unable to ambulate (30 Oct 2019 11:38)      SUBJECTIVE / OVERNIGHT EVENTS:    Patient seen and examined. denies cp sob.       Vital Signs Last 24 Hrs  T(C): 36.6 (30 Oct 2019 05:52), Max: 36.7 (29 Oct 2019 21:04)  T(F): 97.8 (30 Oct 2019 05:52), Max: 98 (29 Oct 2019 21:04)  HR: 59 (30 Oct 2019 09:07) (55 - 71)  BP: 139/80 (30 Oct 2019 09:07) (120/72 - 141/84)  BP(mean): --  RR: 25 (30 Oct 2019 12:35) (18 - 25)  SpO2: 98% (30 Oct 2019 12:35) (94% - 99%)  I&O's Summary    29 Oct 2019 07:01  -  30 Oct 2019 07:00  --------------------------------------------------------  IN: 2250 mL / OUT: 0 mL / NET: 2250 mL    30 Oct 2019 07:01  -  30 Oct 2019 13:04  --------------------------------------------------------  IN: 300 mL / OUT: 0 mL / NET: 300 mL        PE:  GENERAL: NAD, AAOx3  HEAD:  Atraumatic, Normocephalic  EYES: EOMI, PERRLA, conjunctiva and sclera clear  NECK: Supple, No JVD  CHEST/LUNG: CTABL, No wheeze  HEART: Regular rate and rhythm; + murmur  ABDOMEN: Soft, Nontender, Nondistended; Bowel sounds present  EXTREMITIES:  2+ Peripheral Pulses, No clubbing, cyanosis, or edema  SKIN: No rashes or lesions  NEURO: No focal deficits    LABS:                        14.0   26.64 )-----------( 293      ( 30 Oct 2019 09:15 )             42.9     10-30    136  |  99  |  86<H>  ----------------------------<  147<H>  2.7<LL>   |  22  |  1.91<H>    Ca    8.8      30 Oct 2019 06:40  Mg     2.6     10-29        CAPILLARY BLOOD GLUCOSE      POCT Blood Glucose.: 163 mg/dL (30 Oct 2019 09:35)  POCT Blood Glucose.: 172 mg/dL (29 Oct 2019 21:44)  POCT Blood Glucose.: 178 mg/dL (29 Oct 2019 17:20)            RADIOLOGY & ADDITIONAL TESTS:    Imaging Personally Reviewed:  [x] YES  [ ] NO    Consultant(s) Notes Reviewed:  [x] YES  [ ] NO    MEDICATIONS  (STANDING):  ascorbic acid 500 milliGRAM(s) Oral daily  digoxin     Tablet 0.125 milliGRAM(s) Oral daily  heparin  Injectable 5000 Unit(s) SubCutaneous every 12 hours  metoprolol succinate  milliGRAM(s) Oral daily  multivitamin 1 Tablet(s) Oral daily  senna 2 Tablet(s) Oral at bedtime  sodium chloride 0.9%. 1000 milliLiter(s) (100 mL/Hr) IV Continuous <Continuous>    MEDICATIONS  (PRN):  polyethylene glycol 3350 17 Gram(s) Oral daily PRN Constipation      Care Discussed with Consultants/Other Providers [x] YES  [ ] NO    HEALTH ISSUES - PROBLEM Dx:  Weakness: Weakness  NITA (acute kidney injury): NITA (acute kidney injury)  Abdominal distension: Abdominal distension  Leukocytosis: Leukocytosis Patient is a 89y old  Female who presents with a chief complaint of sp fall unable to ambulate (30 Oct 2019 11:38)      SUBJECTIVE / OVERNIGHT EVENTS:    Patient seen and examined. denies cp sob. co fatigue. denies abd pain. states abd has been distended for many years. denies constipation.      Vital Signs Last 24 Hrs  T(C): 36.6 (30 Oct 2019 05:52), Max: 36.7 (29 Oct 2019 21:04)  T(F): 97.8 (30 Oct 2019 05:52), Max: 98 (29 Oct 2019 21:04)  HR: 59 (30 Oct 2019 09:07) (55 - 71)  BP: 139/80 (30 Oct 2019 09:07) (120/72 - 141/84)  BP(mean): --  RR: 25 (30 Oct 2019 12:35) (18 - 25)  SpO2: 98% (30 Oct 2019 12:35) (94% - 99%)  I&O's Summary    29 Oct 2019 07:01  -  30 Oct 2019 07:00  --------------------------------------------------------  IN: 2250 mL / OUT: 0 mL / NET: 2250 mL    30 Oct 2019 07:01  -  30 Oct 2019 13:04  --------------------------------------------------------  IN: 300 mL / OUT: 0 mL / NET: 300 mL        PE:  GENERAL: NAD, AAOx2  HEAD:  Atraumatic, Normocephalic  EYES: EOMI, PERRLA, conjunctiva and sclera clear  NECK: Supple, No JVD  CHEST/LUNG: CTABL, No wheeze  HEART: Regular rate and rhythm; no murmur  ABDOMEN: Soft, distended but not tender ; Bowel sounds present  EXTREMITIES:  2+ Peripheral Pulses, No clubbing, cyanosis, or edema  SKIN: No rashes or lesions  NEURO: No focal deficits    LABS:                        14.0   26.64 )-----------( 293      ( 30 Oct 2019 09:15 )             42.9     10-30    136  |  99  |  86<H>  ----------------------------<  147<H>  2.7<LL>   |  22  |  1.91<H>    Ca    8.8      30 Oct 2019 06:40  Mg     2.6     10-29        CAPILLARY BLOOD GLUCOSE      POCT Blood Glucose.: 163 mg/dL (30 Oct 2019 09:35)  POCT Blood Glucose.: 172 mg/dL (29 Oct 2019 21:44)  POCT Blood Glucose.: 178 mg/dL (29 Oct 2019 17:20)            RADIOLOGY & ADDITIONAL TESTS:    Imaging Personally Reviewed:  [x] YES  [ ] NO    Consultant(s) Notes Reviewed:  [x] YES  [ ] NO    MEDICATIONS  (STANDING):  ascorbic acid 500 milliGRAM(s) Oral daily  digoxin     Tablet 0.125 milliGRAM(s) Oral daily  heparin  Injectable 5000 Unit(s) SubCutaneous every 12 hours  metoprolol succinate  milliGRAM(s) Oral daily  multivitamin 1 Tablet(s) Oral daily  senna 2 Tablet(s) Oral at bedtime  sodium chloride 0.9%. 1000 milliLiter(s) (100 mL/Hr) IV Continuous <Continuous>    MEDICATIONS  (PRN):  polyethylene glycol 3350 17 Gram(s) Oral daily PRN Constipation      Care Discussed with Consultants/Other Providers [x] YES  [ ] NO    HEALTH ISSUES - PROBLEM Dx:  Weakness: Weakness  NITA (acute kidney injury): NITA (acute kidney injury)  Abdominal distension: Abdominal distension  Leukocytosis: Leukocytosis

## 2019-10-30 NOTE — DIETITIAN INITIAL EVALUATION ADULT. - DIET TYPE
HOSPITALIST DAILY PROGRESS NOTE:  Date: 12/7/2017   Attending: Julio C Liang MD      Juanita Stout is a 86 year old female who was admitted on 12/5/2017 for shortness of breath and possible acute on chronic right knee infection.     S: Hemoglobin 6.8 this morning.. Feels slightly better in terms of breathing. No new complaints. No obvious signs of bleeding.    Scheduled Medications   • ferrous sulfate  325 mg Oral BID WC   • sodium chloride (PF)  2 mL Injection 2 times per day   • amiodarone  200 mg Oral Daily   • amLODIPine  10 mg Oral Daily   • atorvastatin  20 mg Oral Nightly   • nystatin   Topical 2 times per day   • LORazepam  1 mg Oral Nightly   • levothyroxine  100 mcg Oral QAM AC   • furosemide  20 mg Oral Daily   • ceFAZolin  2,000 mg Intravenous 3 times per day        O:   Vital 24 Hour Range Last Value   Temperature Temp  Min: 97.7 °F (36.5 °C)  Max: 99.8 °F (37.7 °C) 97.7 °F (36.5 °C) (12/07/17 0832)   Pulse Pulse  Min: 66  Max: 78 67 (12/07/17 0832)   Respiratory Resp  Min: 22  Max: 24 22 (12/07/17 0522)   Non-Invasive  Blood Pressure BP  Min: 118/46  Max: 152/60 152/60 (12/07/17 0832)   Pulse Oximetry SpO2  Min: 94 %  Max: 95 % 95 % (12/07/17 0832)   Weight 68 kg Weight: 71 kg   Height 5' 3\" (160 cm) Height: 5' 3\" (160 cm)   I/O last 3 completed shifts:  In: 1339 [P.O.:986; Blood:353]  Out: 450 [Urine:450]     Physical Exam   General - Patient is in no acute distress. Patient is conversing freely in full sentences.   HEENT - Sclerae are anicteric. Oropharyngeal mucous membranes are moist. Neck is soft and supple.   CV - Regular rate and rhythm without additional heart sounds. No carotid bruits. Peripheral pulses are 2+ and symmetric.   Pulm - Lungs are clear to auscultation bilaterally. No wheezes, rales or rhonchi.   Abd - Soft, non-tender and non-distended. Bowel sounds are normoactive. No guarding or rebound tenderness.   Ext - R knee with area of fluctuance along anterolateral aspect.    Skin- No rashes or lesions.   Neuro - Alert and orient to person, place and time. CNs II-XII are intact. Strength, sensation, and coordination are grossly intact.     Labs     Recent Labs  Lab 12/07/17  0550 12/06/17  1227 12/06/17  0622 12/05/17  1207   SODIUM 137  --  134* 134*   POTASSIUM 3.7 4.0 3.7 4.1   CHLORIDE 103  --  100 100   CO2 23  --  23 25   BUN 20  --  17 20   CREATININE 0.91  --  0.98* 0.85   GLUCOSE 103*  --  94 104*        Recent Labs  Lab 12/07/17  0830 12/07/17  0550 12/06/17  1513 12/06/17  0622 12/05/17  1207   WBC  --  20.5*  --  16.1* 18.2*   HGB  --  7.8* 8.1* 6.8* 7.1*   HCT  --  24.6*  --  21.2* 22.1*   PLT  --  254  --  238 224   MCV  --  83.4  --  83.5 81.9   PST 5*  --   --   --   --           -Assessment and Plan-     Shortness of breath: possibly multifactorial.  in the setting of mild acute on chronic diastolic heart failure as well as anemia (see below). XR chest with congestive changes and effusions. Gave 1x dose of IV lasix. Symptoms improved. Resume PO lasix. Strict intake/output. Daily weights.     Chronic R knee septic prepatellar bursitis: now with area of fluctuance, initially concerning for acute on chronic infection, however cx with no growth. ORtho and ID on board. Per ID, can resume minocycline and follow up with outpt provider.     PAF: continue Amiodarone     Anemia, acute on chronic. - Hemoglobin 6.8 on 12/6. Received 1U pRBC. FOBT negative. Iron panel suggestive of CY. Continue PO iron supplemenation.     CLL: has chronic leukocytosis, though slightly higher than before. Has follow up with her oncologist on 12/18     History of CAD- Continue usual cardiac medications. ASA, plavix recently discontinued for intractable epistaxis. We'll continue to hold given anemia     HTN - continue usual blood pressure medications     Abnormal UA: pt not suggesting signs of UTI, and cx consistent with contamination.     FEN/Nutrition: regular  DVT Prophylaxis: lovenox     CODE  STATUS: FULL      Signed:  Julio C Liang MD      supplement (specify)/Ensure Pudding TID/dysphagia 1, pureed, no liquids

## 2019-10-31 LAB
ANION GAP SERPL CALC-SCNC: 12 MMOL/L — SIGNIFICANT CHANGE UP (ref 5–17)
ANION GAP SERPL CALC-SCNC: 15 MMOL/L — SIGNIFICANT CHANGE UP (ref 5–17)
BLD GP AB SCN SERPL QL: NEGATIVE — SIGNIFICANT CHANGE UP
BUN SERPL-MCNC: 82 MG/DL — HIGH (ref 7–23)
BUN SERPL-MCNC: 82 MG/DL — HIGH (ref 7–23)
CALCIUM SERPL-MCNC: 8.6 MG/DL — SIGNIFICANT CHANGE UP (ref 8.4–10.5)
CALCIUM SERPL-MCNC: 8.7 MG/DL — SIGNIFICANT CHANGE UP (ref 8.4–10.5)
CHLORIDE SERPL-SCNC: 98 MMOL/L — SIGNIFICANT CHANGE UP (ref 96–108)
CHLORIDE SERPL-SCNC: 99 MMOL/L — SIGNIFICANT CHANGE UP (ref 96–108)
CO2 SERPL-SCNC: 21 MMOL/L — LOW (ref 22–31)
CO2 SERPL-SCNC: 22 MMOL/L — SIGNIFICANT CHANGE UP (ref 22–31)
CREAT SERPL-MCNC: 1.4 MG/DL — HIGH (ref 0.5–1.3)
CREAT SERPL-MCNC: 1.56 MG/DL — HIGH (ref 0.5–1.3)
CRP SERPL-MCNC: 5.45 MG/DL — HIGH (ref 0–0.4)
ERYTHROCYTE [SEDIMENTATION RATE] IN BLOOD: 53 MM/HR — HIGH (ref 0–20)
GLUCOSE SERPL-MCNC: 150 MG/DL — HIGH (ref 70–99)
GLUCOSE SERPL-MCNC: 192 MG/DL — HIGH (ref 70–99)
HCT VFR BLD CALC: 38.7 % — SIGNIFICANT CHANGE UP (ref 34.5–45)
HCT VFR BLD CALC: 40.6 % — SIGNIFICANT CHANGE UP (ref 34.5–45)
HGB BLD-MCNC: 13.2 G/DL — SIGNIFICANT CHANGE UP (ref 11.5–15.5)
HGB BLD-MCNC: 13.2 G/DL — SIGNIFICANT CHANGE UP (ref 11.5–15.5)
MCHC RBC-ENTMCNC: 30.1 PG — SIGNIFICANT CHANGE UP (ref 27–34)
MCHC RBC-ENTMCNC: 30.6 PG — SIGNIFICANT CHANGE UP (ref 27–34)
MCHC RBC-ENTMCNC: 32.5 GM/DL — SIGNIFICANT CHANGE UP (ref 32–36)
MCHC RBC-ENTMCNC: 34.1 GM/DL — SIGNIFICANT CHANGE UP (ref 32–36)
MCV RBC AUTO: 89.8 FL — SIGNIFICANT CHANGE UP (ref 80–100)
MCV RBC AUTO: 92.7 FL — SIGNIFICANT CHANGE UP (ref 80–100)
NRBC # BLD: 0 /100 WBCS — SIGNIFICANT CHANGE UP (ref 0–0)
PLATELET # BLD AUTO: 285 K/UL — SIGNIFICANT CHANGE UP (ref 150–400)
PLATELET # BLD AUTO: 300 K/UL — SIGNIFICANT CHANGE UP (ref 150–400)
POTASSIUM SERPL-MCNC: 2.7 MMOL/L — CRITICAL LOW (ref 3.5–5.3)
POTASSIUM SERPL-MCNC: 3.2 MMOL/L — LOW (ref 3.5–5.3)
POTASSIUM SERPL-SCNC: 2.7 MMOL/L — CRITICAL LOW (ref 3.5–5.3)
POTASSIUM SERPL-SCNC: 3.2 MMOL/L — LOW (ref 3.5–5.3)
RBC # BLD: 4.31 M/UL — SIGNIFICANT CHANGE UP (ref 3.8–5.2)
RBC # BLD: 4.38 M/UL — SIGNIFICANT CHANGE UP (ref 3.8–5.2)
RBC # FLD: 13.2 % — SIGNIFICANT CHANGE UP (ref 10.3–14.5)
RBC # FLD: 13.2 % — SIGNIFICANT CHANGE UP (ref 10.3–14.5)
RH IG SCN BLD-IMP: POSITIVE — SIGNIFICANT CHANGE UP
SODIUM SERPL-SCNC: 132 MMOL/L — LOW (ref 135–145)
SODIUM SERPL-SCNC: 135 MMOL/L — SIGNIFICANT CHANGE UP (ref 135–145)
WBC # BLD: 22.22 K/UL — HIGH (ref 3.8–10.5)
WBC # BLD: 22.46 K/UL — HIGH (ref 3.8–10.5)
WBC # FLD AUTO: 22.22 K/UL — HIGH (ref 3.8–10.5)
WBC # FLD AUTO: 22.46 K/UL — HIGH (ref 3.8–10.5)

## 2019-10-31 RX ORDER — POTASSIUM CHLORIDE 20 MEQ
10 PACKET (EA) ORAL
Refills: 0 | Status: COMPLETED | OUTPATIENT
Start: 2019-10-31 | End: 2019-10-31

## 2019-10-31 RX ORDER — SIMETHICONE 80 MG/1
80 TABLET, CHEWABLE ORAL EVERY 6 HOURS
Refills: 0 | Status: DISCONTINUED | OUTPATIENT
Start: 2019-10-31 | End: 2019-11-06

## 2019-10-31 RX ORDER — POTASSIUM CHLORIDE 20 MEQ
40 PACKET (EA) ORAL
Refills: 0 | Status: DISCONTINUED | OUTPATIENT
Start: 2019-10-31 | End: 2019-11-06

## 2019-10-31 RX ORDER — CHLORHEXIDINE GLUCONATE 213 G/1000ML
1 SOLUTION TOPICAL DAILY
Refills: 0 | Status: DISCONTINUED | OUTPATIENT
Start: 2019-10-31 | End: 2019-11-06

## 2019-10-31 RX ORDER — POTASSIUM CHLORIDE 20 MEQ
40 PACKET (EA) ORAL ONCE
Refills: 0 | Status: COMPLETED | OUTPATIENT
Start: 2019-10-31 | End: 2019-10-31

## 2019-10-31 RX ORDER — POTASSIUM CHLORIDE 20 MEQ
40 PACKET (EA) ORAL EVERY 4 HOURS
Refills: 0 | Status: DISCONTINUED | OUTPATIENT
Start: 2019-10-31 | End: 2019-10-31

## 2019-10-31 RX ADMIN — SIMETHICONE 80 MILLIGRAM(S): 80 TABLET, CHEWABLE ORAL at 18:03

## 2019-10-31 RX ADMIN — Medication 40 MILLIEQUIVALENT(S): at 18:01

## 2019-10-31 RX ADMIN — SIMETHICONE 80 MILLIGRAM(S): 80 TABLET, CHEWABLE ORAL at 23:02

## 2019-10-31 RX ADMIN — HEPARIN SODIUM 5000 UNIT(S): 5000 INJECTION INTRAVENOUS; SUBCUTANEOUS at 18:03

## 2019-10-31 RX ADMIN — SIMETHICONE 80 MILLIGRAM(S): 80 TABLET, CHEWABLE ORAL at 11:41

## 2019-10-31 RX ADMIN — Medication 100 MILLIEQUIVALENT(S): at 08:46

## 2019-10-31 RX ADMIN — Medication 1 TABLET(S): at 11:41

## 2019-10-31 RX ADMIN — SENNA PLUS 2 TABLET(S): 8.6 TABLET ORAL at 23:02

## 2019-10-31 RX ADMIN — Medication 100 MILLIGRAM(S): at 11:41

## 2019-10-31 RX ADMIN — Medication 500 MILLIGRAM(S): at 11:41

## 2019-10-31 RX ADMIN — Medication 100 MILLIEQUIVALENT(S): at 07:23

## 2019-10-31 RX ADMIN — Medication 100 MILLIEQUIVALENT(S): at 06:17

## 2019-10-31 RX ADMIN — Medication 0.12 MILLIGRAM(S): at 05:10

## 2019-10-31 RX ADMIN — Medication 40 MILLIEQUIVALENT(S): at 18:03

## 2019-10-31 NOTE — CHART NOTE - NSCHARTNOTEFT_GEN_A_CORE
PA called for evaluation pt hematuria    pt 89y/o F admitted for s/p Fall. Currently NITA and urinary retention. nephrology been f/u with pt. recommend  Macedo placement. Macedo placed last night by RN, was draining clear yellow urine, now become dark red color.  pt had US yesterday show distended bladder, PVR > 900cc. per RN , after macedo placement, over night pt urine output more then 1000cc in 5h. seen and examined pt at bed side. Macedo in place, draining well, dark red color urine see in the tube. hand irrigation with water, no clot, urine turn light pink right way.     -No CBI needed, monitor urine color  -Monitor for postobstructive diuresis  -well PO and IV hydration, BMP Q6h  -called back as needed

## 2019-10-31 NOTE — PROGRESS NOTE ADULT - SUBJECTIVE AND OBJECTIVE BOX
Patient is a 89y old  Female who presents with a chief complaint of sp fall unable to ambulate (30 Oct 2019 18:24)      SUBJECTIVE / OVERNIGHT EVENTS:    Patient seen and examined. co fatigue. denies cp sob. no abd pain.      Vital Signs Last 24 Hrs  T(C): 36.4 (31 Oct 2019 04:29), Max: 36.4 (31 Oct 2019 04:29)  T(F): 97.5 (31 Oct 2019 04:29), Max: 97.5 (31 Oct 2019 04:29)  HR: 57 (31 Oct 2019 04:29) (57 - 59)  BP: 125/61 (31 Oct 2019 04:29) (125/61 - 127/74)  BP(mean): --  RR: 16 (31 Oct 2019 04:29) (16 - 25)  SpO2: 97% (31 Oct 2019 04:29) (96% - 98%)  I&O's Summary    30 Oct 2019 07:01  -  31 Oct 2019 07:00  --------------------------------------------------------  IN: 1280 mL / OUT: 1850 mL / NET: -570 mL    31 Oct 2019 07:01  -  31 Oct 2019 11:58  --------------------------------------------------------  IN: 0 mL / OUT: 150 mL / NET: -150 mL        PE:  GENERAL: NAD, AAOx2  HEAD:  Atraumatic, Normocephalic  EYES: EOMI, PERRLA, conjunctiva and sclera clear  NECK: Supple, No JVD  CHEST/LUNG: CTABL, No wheeze  HEART: Regular rate and rhythm; no murmur  ABDOMEN: Soft, distended but not tender ; Bowel sounds present  EXTREMITIES:  2+ Peripheral Pulses, No clubbing, cyanosis, or edema  SKIN: No rashes or lesions  NEURO: No focal deficits    LABS:                        13.2   22.22 )-----------( 300      ( 31 Oct 2019 08:57 )             40.6     10-31    135  |  99  |  82<H>  ----------------------------<  150<H>  2.7<LL>   |  21<L>  |  1.56<H>    Ca    8.6      31 Oct 2019 05:21  Mg     2.6     10-29        CAPILLARY BLOOD GLUCOSE      POCT Blood Glucose.: 149 mg/dL (30 Oct 2019 21:31)  POCT Blood Glucose.: 160 mg/dL (30 Oct 2019 16:27)            RADIOLOGY & ADDITIONAL TESTS:    Imaging Personally Reviewed:  [x] YES  [ ] NO    Consultant(s) Notes Reviewed:  [x] YES  [ ] NO    MEDICATIONS  (STANDING):  ascorbic acid 500 milliGRAM(s) Oral daily  chlorhexidine 4% Liquid 1 Application(s) Topical daily  digoxin     Tablet 0.125 milliGRAM(s) Oral daily  heparin  Injectable 5000 Unit(s) SubCutaneous every 12 hours  metoprolol succinate  milliGRAM(s) Oral daily  multivitamin 1 Tablet(s) Oral daily  potassium chloride   Solution 40 milliEquivalent(s) Oral two times a day  senna 2 Tablet(s) Oral at bedtime  simethicone 80 milliGRAM(s) Chew every 6 hours  sodium chloride 0.9%. 1000 milliLiter(s) (100 mL/Hr) IV Continuous <Continuous>    MEDICATIONS  (PRN):  polyethylene glycol 3350 17 Gram(s) Oral daily PRN Constipation      Care Discussed with Consultants/Other Providers [x] YES  [ ] NO    HEALTH ISSUES - PROBLEM Dx:  Weakness: Weakness  NITA (acute kidney injury): NITA (acute kidney injury)  Abdominal distension: Abdominal distension  Leukocytosis: Leukocytosis

## 2019-10-31 NOTE — PROGRESS NOTE ADULT - PROBLEM SELECTOR PLAN 1
has had leukocytosis in sunrise appears to be chronic  blood and urine cx negative  no clear infectious etiology driving leukocytosis  monitor off antibx

## 2019-10-31 NOTE — PROGRESS NOTE ADULT - SUBJECTIVE AND OBJECTIVE BOX
[facility], Division of Infectious Diseases  ASTER Farr A. Lee  [phone]    Name: EFRAIN GAMA  Age: 89y  Gender: Female  MRN: [id number]    Interval History--  Notes reviewed  no new complaints  states her lips are dry  she still hasnt had a bowel movement    Past Medical History--  Heart failure  Paroxysmal atrial fibrillation  HTN (hypertension)  CHF (congestive heart failure)  No significant past surgical history  History of appendectomy  No significant past surgical history      For details regarding the patient's social history, family history, and other miscellaneous elements, please refer the initial infectious diseases consultation and/or the admitting history and physical examination for this admission.    Allergies    No Known Allergies    Intolerances        Medications--  Antibiotics:    Immunologic:    Other:  ascorbic acid  chlorhexidine 4% Liquid  digoxin     Tablet  heparin  Injectable  metoprolol succinate ER  multivitamin  polyethylene glycol 3350 PRN  potassium chloride    Tablet ER  potassium chloride   Solution  senna  simethicone  sodium chloride 0.9%.      Review of Systems--  A 10-point review of systems was obtained.     Pertinent positives and negatives--  Constitutional: No fevers. No Chills. No Rigors.   Cardiovascular: No chest pain. No palpitations.  Respiratory: No shortness of breath. No cough.  Gastrointestinal: No nausea or vomiting. No diarrhea or constipation.   Psychiatric: no anxiety    Review of systems otherwise negative except as previously noted.    Physical Examination--  Vital Signs: T(F): 97.4 (10-31-19 @ 12:00), Max: 97.5 (10-31-19 @ 04:29)  HR: 56 (10-31-19 @ 12:00)  BP: 121/72 (10-31-19 @ 12:00)  RR: 17 (10-31-19 @ 12:00)  SpO2: 96% (10-31-19 @ 12:00)  Wt(kg): --  General: Nontoxic-appearing Female in no acute distress.  HEENT: AT/NC. Anicteric. dry mucus membranes  Neck: Not rigid. No sense of mass.  Nodes: None palpable.  Lungs: Clear bilaterally without rales, wheezing or rhonchi  Heart: Regular rate and rhythm. No Murmur.  Abdomen: Bowel sounds present and normoactive. Soft. distended. Nontender.   Extremities: No cyanosis or clubbing. No edema.   gu macedo with blood tinged urine  Skin: Warm. Dry. Good turgor. No rash. No vasculitic stigmata.  Psychiatric: Appropriate affect and mood for situation.         Laboratory Studies--  CBC                        13.2   22.22 )-----------( 300      ( 31 Oct 2019 08:57 )             40.6       Chemistries  10-31    132<L>  |  98  |  82<H>  ----------------------------<  192<H>  3.2<L>   |  22  |  1.40<H>    Ca    8.7      31 Oct 2019 12:44  Mg     2.6     10-29        Culture Data    Culture - Urine (collected 29 Oct 2019 06:12)  Source: .Urine  Final Report (30 Oct 2019 07:23):    <10,000 CFU/mL Normal Urogenital [place]    Culture - Blood (collected 28 Oct 2019 16:53)  Source: .Blood  Preliminary Report (29 Oct 2019 17:01):    No growth to date.    Culture - Urine (collected 27 Oct 2019 10:18)  Source: .Urine  Final Report (28 Oct 2019 08:18):    No growth

## 2019-10-31 NOTE — PROGRESS NOTE ADULT - SUBJECTIVE AND OBJECTIVE BOX
[facility]   MD Ivett Werner D.O. Fatima Sheikh, D.O. Ruoru Wong, PA    From 7 AM - 5 PM:  OFFICE: [phone]  Dr. Reyes cell: [phone]  Dr. Crouch cell: [phone]  Dr. Dumont cell: [phone]  CIERRA Vargas cell: [phone]    From 5 PM - 7 AM: Answering Service: [phone]        RENAL FOLLOW UP NOTE  --------------------------------------------------------------------------------  HPI: Pt seen and examined this AM. States she feels the same as yesterday. Is not eating/drinking much. Is on NS @120 mL/hr        PAST HISTORY  --------------------------------------------------------------------------------  No significant changes to PMH, PSH, FHx, SHx, unless otherwise noted    ALLERGIES & MEDICATIONS  --------------------------------------------------------------------------------  Allergies    No Known Allergies    Intolerances      Standing Inpatient Medications  ascorbic acid 500 milliGRAM(s) Oral daily  chlorhexidine 4% Liquid 1 Application(s) Topical daily  digoxin     Tablet 0.125 milliGRAM(s) Oral daily  heparin  Injectable 5000 Unit(s) SubCutaneous every 12 hours  metoprolol succinate  milliGRAM(s) Oral daily  multivitamin 1 Tablet(s) Oral daily  potassium chloride    Tablet ER 40 milliEquivalent(s) Oral once  potassium chloride   Solution 40 milliEquivalent(s) Oral two times a day  senna 2 Tablet(s) Oral at bedtime  simethicone 80 milliGRAM(s) Chew every 6 hours  sodium chloride 0.9%. 1000 milliLiter(s) IV Continuous <Continuous>    PRN Inpatient Medications  polyethylene glycol 3350 17 Gram(s) Oral daily PRN      REVIEW OF SYSTEMS  --------------------------------------------------------------------------------  General: no fever  CVS: no chest pain  RESP: no sob, no cough  ABD: no abdominal pain  : no dysuria,  MSK: no edema     VITALS/PHYSICAL EXAM  --------------------------------------------------------------------------------  T(C): 36.3 (10-31-19 @ 12:00), Max: 36.4 (10-31-19 @ 04:29)  HR: 56 (10-31-19 @ 12:00) (56 - 59)  BP: 121/72 (10-31-19 @ 12:00) (121/72 - 127/74)  RR: 17 (10-31-19 @ 12:00) (16 - 18)  SpO2: 96% (10-31-19 @ 12:00) (96% - 97%)  Wt(kg): --        10-30-19 @ 07:01  -  10-31-19 @ 07:00  --------------------------------------------------------  IN: 1280 mL / OUT: 1850 mL / NET: -570 mL    10-31-19 @ 07:01  -  10-31-19 @ 14:47  --------------------------------------------------------  IN: 0 mL / OUT: 150 mL / NET: -150 mL      Physical Exam:  	Gen: NAD  	HEENT: MMM  	Pulm: CTA B/L  	CV: S1S2, + murmur  	Abd: Soft, +BS, Very distended abdomen  	Ext: No LE edema B/L              Neuro: Awake   	Skin: Warm and Dry   	    LABS/STUDIES  --------------------------------------------------------------------------------              13.2   22.22 >-----------<  300      [10-31-19 @ 08:57]              40.6     132  |  98  |  82  ----------------------------<  192      [10-31-19 @ 12:44]  3.2   |  22  |  1.40        Ca     8.7     [10-31-19 @ 12:44]      Mg     2.6     [10-29-19 @ 15:51]            Creatinine Trend:  SCr 1.40 [10-31 @ 12:44]  SCr 1.56 [10-31 @ 05:21]  SCr 1.85 [10-30 @ 16:55]  SCr 1.91 [10-30 @ 06:40]  SCr 1.83 [10-29 @ 15:51]    Urinalysis - [10-28-19 @ 08:24]      Color Yellow / Appearance Slightly Turbid / SG 1.017 / pH 8.5      Gluc Negative / Ketone Negative  / Bili Negative / Urobili Negative       Blood Moderate / Protein 30 mg/dL / Leuk Est Large / Nitrite Positive      RBC >50 / WBC 7 / Hyaline 0 / Gran  / Sq Epi  / Non Sq Epi 8 / Bacteria Moderate      HbA1c 7.8      [10-29-19 @ 13:12]

## 2019-10-31 NOTE — PROGRESS NOTE ADULT - ASSESSMENT
89 yo F w/ PMH HTN, CHF, PxAfib who presented for evaluation of fall 10/29/19. Found to have marked bowel distension and NITA. Nephrology is consulted for NITA management.    NITA  - baseline Cr in 2018 is 1.1  - admitted w/ elevated Cr  - NITA 2/2 urinary obstruction    - Avoid nephrotoxins including NSAIDs, IV contrast (if possible), [employer] products  - maintain MAP >65    - monitor I/O's closely  - monitor for post-obstructive diuresis    - continue IV NS for now given poor PO intake    Hematuria  - likely traumatic from large volume diuresis from relief of obstruction  - macedo catheter placed yesterday w/ over 1000 mL urine output  - urology following for hematuria  - trend H/H    Hypokalemia  - likely 2/2 poor PO intake  - supplement as needed  - monitor closely after post-obstructive diuresis as it will decline    - repeat BMP again this evening after repletion    Proteinuria/hematuria  - likely due to UTI as this is new for her since admission  - repeat UA after Abx completed

## 2019-10-31 NOTE — PROGRESS NOTE ADULT - ASSESSMENT
89 yo female with pmhx of HTN, HFpEF, chr afib who has been experiencing worsening weakness x 2 wks, s/p mech fall yesterday, CT and xrays neg. unable to ambulate.    # Fall/weakness  PT eval  prn tylenol for pain/discomfort  S&S recs pureed texture diet    # leukocytosis  acute on chronic leukocytosis likely 2/2 urinary retention, now downtrending  appreciate ID recs, will observe off ABX, no signs of infection  urine and blood cx neg  repeat CT shows no change in colonic distention - will not pursue further investigation as family does not want aggressive intervention    # NITA  2/2 urinary retention and mod right hydro  >900cc urine in bladder on US, sp macedo placement for bladder decompression  hematuria likely 2/2 rapid bladder decompression, appreciate urology recs, no indication for CBI  hold thiazide and furosemide  appreciate renal recs    #elev BS, new DM  elev hba1c 7.8  will be conservative given advance age  monitor bld sugars    #CHF  last echo aug 2018- normal study- normal LV systolic function  hold home diuretics in the setting of hyponatremia and NITA  cont digoxin  mild trops d/t elev cr, ekg reviewed-sinus bakari 57    # HTN  cont metoprolol, hold lasix and dyazide    #GOC - dw dgtr and no aggressive measures  DNR/DNI    [facility]  [phone] 89 yo female with pmhx of HTN, HFpEF, chr afib who has been experiencing worsening weakness x 2 wks, s/p mech fall yesterday, CT and xrays neg. unable to ambulate.    # Fall/weakness  PT eval  prn tylenol for pain/discomfort  S&S recs pureed texture diet    # leukocytosis  acute on chronic leukocytosis likely 2/2 urinary retention, now downtrending  appreciate ID recs, will observe off ABX, no signs of infection  urine and blood cx neg  repeat CT shows no change in colonic distention - will not pursue further investigation as family does not want aggressive intervention    # NITA  2/2 urinary retention and mod right hydro  >900cc urine in bladder on US, sp macedo placement for bladder decompression  hematuria likely 2/2 rapid bladder decompression, appreciate urology recs, no indication for CBI  hold thiazide and furosemide  appreciate renal recs  k repletion    #elev BS, new DM  elev hba1c 7.8  will be conservative given advance age  monitor bld sugars    #CHF  last echo aug 2018- normal study- normal LV systolic function  hold home diuretics in the setting of hyponatremia and NITA  cont digoxin  mild trops d/t elev cr, ekg reviewed-sinus bakari 57    # HTN  cont metoprolol, hold lasix and dyazide    #GOC - dw dgtr and no aggressive measures  DNR/DNI    [facility]  [phone]

## 2019-10-31 NOTE — PROGRESS NOTE ADULT - PROBLEM SELECTOR PLAN 2
persists  ct without colonic distention  and family does not want to pursue furtr diagnostic or intervention

## 2019-10-31 NOTE — PROVIDER CONTACT NOTE (CRITICAL VALUE NOTIFICATION) - ACTION/TREATMENT ORDERED:
repletions ordered
Administer ordered K supplementation, will continue to monitor and maintain pt safety.

## 2019-10-31 NOTE — CHART NOTE - NSCHARTNOTEFT_GEN_A_CORE
Notified by RN pt with gross hematuria  Pt examined at [facility], [facility], non-toxic appearing  pt with a Styles placement last night around hr 2200, Pt now with 400cc of ashley hematuria. VSS. as per RN bladder scan >500cc. Urology called for a evaluation for a potential CBI overnight.   - CBC/ T&S STAT ordered  - will f/u urology consultation  - hold AM SQ heparin   -f/u with nephro   - will endorse to primary team Notified by RN pt with gross hematuria  Pt examined at [facility], [facility], non-toxic appearing  pt with a Styles placement last night around hr 2200, Pt now with 400cc of ashley hematuria. VSS. as per RN bladder scan >500cc. Urology called for a evaluation for a potential CBI overnight. Urology at  a bedside, bladder irrigated, hematuria most likely 2/2 UTI vs. bladder distention.    - CBC/ T&S STAT ordered  - hold AM SQ heparin   -f/u with nephro   - will endorse to primary team

## 2019-11-01 LAB
ANION GAP SERPL CALC-SCNC: 14 MMOL/L — SIGNIFICANT CHANGE UP (ref 5–17)
BUN SERPL-MCNC: 71 MG/DL — HIGH (ref 7–23)
CALCIUM SERPL-MCNC: 8.8 MG/DL — SIGNIFICANT CHANGE UP (ref 8.4–10.5)
CHLORIDE SERPL-SCNC: 104 MMOL/L — SIGNIFICANT CHANGE UP (ref 96–108)
CO2 SERPL-SCNC: 20 MMOL/L — LOW (ref 22–31)
CREAT SERPL-MCNC: 1.27 MG/DL — SIGNIFICANT CHANGE UP (ref 0.5–1.3)
GLUCOSE BLDC GLUCOMTR-MCNC: 155 MG/DL — HIGH (ref 70–99)
GLUCOSE BLDC GLUCOMTR-MCNC: 190 MG/DL — HIGH (ref 70–99)
GLUCOSE SERPL-MCNC: 158 MG/DL — HIGH (ref 70–99)
HCT VFR BLD CALC: 41.6 % — SIGNIFICANT CHANGE UP (ref 34.5–45)
HGB BLD-MCNC: 13.3 G/DL — SIGNIFICANT CHANGE UP (ref 11.5–15.5)
MAGNESIUM SERPL-MCNC: 2.8 MG/DL — HIGH (ref 1.6–2.6)
MCHC RBC-ENTMCNC: 29.4 PG — SIGNIFICANT CHANGE UP (ref 27–34)
MCHC RBC-ENTMCNC: 32 GM/DL — SIGNIFICANT CHANGE UP (ref 32–36)
MCV RBC AUTO: 92 FL — SIGNIFICANT CHANGE UP (ref 80–100)
PLATELET # BLD AUTO: 323 K/UL — SIGNIFICANT CHANGE UP (ref 150–400)
POTASSIUM SERPL-MCNC: 3.5 MMOL/L — SIGNIFICANT CHANGE UP (ref 3.5–5.3)
POTASSIUM SERPL-SCNC: 3.5 MMOL/L — SIGNIFICANT CHANGE UP (ref 3.5–5.3)
RBC # BLD: 4.52 M/UL — SIGNIFICANT CHANGE UP (ref 3.8–5.2)
RBC # FLD: 13.5 % — SIGNIFICANT CHANGE UP (ref 10.3–14.5)
SODIUM SERPL-SCNC: 138 MMOL/L — SIGNIFICANT CHANGE UP (ref 135–145)
WBC # BLD: 17.16 K/UL — HIGH (ref 3.8–10.5)
WBC # FLD AUTO: 17.16 K/UL — HIGH (ref 3.8–10.5)

## 2019-11-01 RX ADMIN — Medication 1 TABLET(S): at 13:10

## 2019-11-01 RX ADMIN — Medication 500 MILLIGRAM(S): at 13:10

## 2019-11-01 RX ADMIN — Medication 40 MILLIEQUIVALENT(S): at 06:21

## 2019-11-01 RX ADMIN — CHLORHEXIDINE GLUCONATE 1 APPLICATION(S): 213 SOLUTION TOPICAL at 13:14

## 2019-11-01 RX ADMIN — HEPARIN SODIUM 5000 UNIT(S): 5000 INJECTION INTRAVENOUS; SUBCUTANEOUS at 18:01

## 2019-11-01 RX ADMIN — Medication 0.12 MILLIGRAM(S): at 06:21

## 2019-11-01 RX ADMIN — HEPARIN SODIUM 5000 UNIT(S): 5000 INJECTION INTRAVENOUS; SUBCUTANEOUS at 06:21

## 2019-11-01 RX ADMIN — SIMETHICONE 80 MILLIGRAM(S): 80 TABLET, CHEWABLE ORAL at 18:01

## 2019-11-01 RX ADMIN — SENNA PLUS 2 TABLET(S): 8.6 TABLET ORAL at 21:21

## 2019-11-01 RX ADMIN — SIMETHICONE 80 MILLIGRAM(S): 80 TABLET, CHEWABLE ORAL at 06:21

## 2019-11-01 RX ADMIN — Medication 40 MILLIEQUIVALENT(S): at 18:01

## 2019-11-01 RX ADMIN — Medication 100 MILLIGRAM(S): at 06:21

## 2019-11-01 RX ADMIN — SIMETHICONE 80 MILLIGRAM(S): 80 TABLET, CHEWABLE ORAL at 13:10

## 2019-11-01 NOTE — PROGRESS NOTE ADULT - ASSESSMENT
89 yo female with pmhx of HTN, HFpEF, chr afib who has been experiencing worsening weakness x 2 wks, s/p mech fall yesterday, CT and xrays neg. unable to ambulate.    # Fall/weakness  PT eval  prn tylenol for pain  S&S recs pureed texture diet    # leukocytosis  acute on chronic leukocytosis likely 2/2 urinary retention, now downtrending  appreciate ID recs, will observe off ABX, no signs of infection  urine and blood cx neg  repeat CT shows no change in colonic distention - will not pursue further investigation as family does not want aggressive intervention    # NITA  2/2 urinary retention and mod right hydro  >900cc urine in bladder on US, sp macedo placement for bladder decompression  hematuria likely 2/2 rapid bladder decompression, appreciate urology recs, no indication for CBI  fu urology on when to TOV  hold thiazide and furosemide  appreciate renal recs  k repletion    #elev BS, new DM  elev hba1c 7.8  will be conservative given advance age  monitor bld sugars    #CHF  last echo aug 2018- normal study- normal LV systolic function  hold home diuretics in the setting of hyponatremia and NITA  cont digoxin  mild trops d/t elev cr, ekg reviewed-sinus bakari 57    # HTN  cont metoprolol, hold lasix and dyazide    #GOC - dw dgtr and no aggressive measures  DNR/DNI    [facility]  [phone]

## 2019-11-01 NOTE — PROGRESS NOTE ADULT - SUBJECTIVE AND OBJECTIVE BOX
infectious diseases progress note:    EFRAIN GAMA is a 89y y. o. Female patient    Patient reports: "I am okay considering this belly"    ROS:    EYES:  Negative  blurry vision or double vision  GASTROINTESTINAL:  Negative for nausea, vomiting, diarrhea  -otherwise negative except for subjective    Allergies    No Known Allergies    Intolerances        ANTIBIOTICS/RELEVANT:  antimicrobials    immunologic:    OTHER:  ascorbic acid 500 milliGRAM(s) Oral daily  chlorhexidine 4% Liquid 1 Application(s) Topical daily  digoxin     Tablet 0.125 milliGRAM(s) Oral daily  heparin  Injectable 5000 Unit(s) SubCutaneous every 12 hours  metoprolol succinate  milliGRAM(s) Oral daily  multivitamin 1 Tablet(s) Oral daily  polyethylene glycol 3350 17 Gram(s) Oral daily PRN  potassium chloride   Solution 40 milliEquivalent(s) Oral two times a day  senna 2 Tablet(s) Oral at bedtime  simethicone 80 milliGRAM(s) Chew every 6 hours  sodium chloride 0.9%. 1000 milliLiter(s) IV Continuous <Continuous>      Objective:  Vital Signs Last 24 Hrs  T(C): 36.4 (01 Nov 2019 04:38), Max: 36.4 (01 Nov 2019 04:38)  T(F): 97.6 (01 Nov 2019 04:38), Max: 97.6 (01 Nov 2019 04:38)  HR: 55 (01 Nov 2019 04:38) (54 - 56)  BP: 132/71 (01 Nov 2019 04:38) (111/66 - 132/71)  BP(mean): --  RR: 18 (01 Nov 2019 04:38) (17 - 18)  SpO2: 97% (01 Nov 2019 04:38) (96% - 98%)    T(C): 36.4 (11-01-19 @ 04:38), Max: 36.4 (10-31-19 @ 04:29)  T(C): 36.4 (11-01-19 @ 04:38), Max: 36.7 (10-29-19 @ 21:04)  T(C): 36.4 (11-01-19 @ 04:38), Max: 36.7 (10-29-19 @ 05:56)    PHYSICAL EXAM:  Constitutional: Well-developed, well nourished  Eyes: PERRLA, EOMI  Ear/Nose/Throat: oropharynx normal	  Neck: no JVD, no lymphadenopathy, supple  Respiratory: no accessory muscle use  Cardiovascular: RRR,   Gastrointestinal: firm distended abd  Extremities: no clubbing, no cyanosis, edema absent  -noted bloody urine in macedo and bag      LABS:                        13.3   17.16 )-----------( 323      ( 01 Nov 2019 08:45 )             41.6       17.16 11-01 @ 08:45  22.22 10-31 @ 08:57  22.46 10-31 @ 05:21  28.18 10-30 @ 16:55  26.64 10-30 @ 09:15  28.07 10-29 @ 15:51  25.57 10-29 @ 08:14  17.82 10-28 @ 06:14  14.93 10-27 @ 06:27      11-01    138  |  104  |  71<H>  ----------------------------<  158<H>  3.5   |  20<L>  |  1.27    Ca    8.8      01 Nov 2019 06:31  Mg     2.8     11-01        Creatinine, Serum: 1.27 mg/dL (11-01-19 @ 06:31)  Creatinine, Serum: 1.40 mg/dL (10-31-19 @ 12:44)  Creatinine, Serum: 1.56 mg/dL (10-31-19 @ 05:21)  Creatinine, Serum: 1.85 mg/dL (10-30-19 @ 16:55)  Creatinine, Serum: 1.91 mg/dL (10-30-19 @ 06:40)  Creatinine, Serum: 1.83 mg/dL (10-29-19 @ 15:51)  Creatinine, Serum: 1.67 mg/dL (10-29-19 @ 06:16)  Creatinine, Serum: 1.65 mg/dL (10-28-19 @ 06:14)  Creatinine, Serum: 1.80 mg/dL (10-27-19 @ 11:08)  Creatinine, Serum: 1.94 mg/dL (10-27-19 @ 06:27)                MICROBIOLOGY:              RADIOLOGY & ADDITIONAL STUDIES:

## 2019-11-01 NOTE — PROGRESS NOTE ADULT - SUBJECTIVE AND OBJECTIVE BOX
Patient is a 89y old  Female who presents with a chief complaint of sp fall unable to ambulate (01 Nov 2019 09:12)      SUBJECTIVE / OVERNIGHT EVENTS:    Patient seen and examined. denies cp sob. co fatigue.       Vital Signs Last 24 Hrs  T(C): 36.4 (01 Nov 2019 04:38), Max: 36.4 (01 Nov 2019 04:38)  T(F): 97.6 (01 Nov 2019 04:38), Max: 97.6 (01 Nov 2019 04:38)  HR: 55 (01 Nov 2019 04:38) (54 - 55)  BP: 132/71 (01 Nov 2019 04:38) (111/66 - 132/71)  BP(mean): --  RR: 18 (01 Nov 2019 04:38) (18 - 18)  SpO2: 97% (01 Nov 2019 04:38) (97% - 98%)  I&O's Summary    31 Oct 2019 07:01  -  01 Nov 2019 07:00  --------------------------------------------------------  IN: 660 mL / OUT: 1015 mL / NET: -355 mL    01 Nov 2019 07:01  -  01 Nov 2019 12:16  --------------------------------------------------------  IN: 180 mL / OUT: 0 mL / NET: 180 mL        PE:  GENERAL: NAD, AAOx1, frail  HEAD:  Atraumatic, Normocephalic  EYES: EOMI, PERRLA, conjunctiva and sclera clear  NECK: Supple, No JVD  CHEST/LUNG: CTABL, No wheeze  HEART: Regular rate and rhythm; no murmur  ABDOMEN: Soft, Nontender, distended; Bowel sounds present  : macedo blood tinged urine   EXTREMITIES:  2+ Peripheral Pulses, No clubbing, cyanosis, or edema  SKIN: No rashes or lesions  NEURO: No focal deficits    LABS:                        13.3   17.16 )-----------( 323      ( 01 Nov 2019 08:45 )             41.6     11-01    138  |  104  |  71<H>  ----------------------------<  158<H>  3.5   |  20<L>  |  1.27    Ca    8.8      01 Nov 2019 06:31  Mg     2.8     11-01        CAPILLARY BLOOD GLUCOSE      POCT Blood Glucose.: 190 mg/dL (01 Nov 2019 08:49)            RADIOLOGY & ADDITIONAL TESTS:    Imaging Personally Reviewed:  [x] YES  [ ] NO    Consultant(s) Notes Reviewed:  [x] YES  [ ] NO    MEDICATIONS  (STANDING):  ascorbic acid 500 milliGRAM(s) Oral daily  chlorhexidine 4% Liquid 1 Application(s) Topical daily  digoxin     Tablet 0.125 milliGRAM(s) Oral daily  heparin  Injectable 5000 Unit(s) SubCutaneous every 12 hours  metoprolol succinate  milliGRAM(s) Oral daily  multivitamin 1 Tablet(s) Oral daily  potassium chloride   Solution 40 milliEquivalent(s) Oral two times a day  senna 2 Tablet(s) Oral at bedtime  simethicone 80 milliGRAM(s) Chew every 6 hours  sodium chloride 0.9%. 1000 milliLiter(s) (100 mL/Hr) IV Continuous <Continuous>    MEDICATIONS  (PRN):  polyethylene glycol 3350 17 Gram(s) Oral daily PRN Constipation      Care Discussed with Consultants/Other Providers [x] YES  [ ] NO    HEALTH ISSUES - PROBLEM Dx:  Weakness: Weakness  NITA (acute kidney injury): NITA (acute kidney injury)  Abdominal distension: Abdominal distension  Leukocytosis: Leukocytosis

## 2019-11-01 NOTE — PROGRESS NOTE ADULT - PROBLEM SELECTOR PLAN 2
persists  ct without colonic distention  and family does not want to pursue further diagnostic or intervention

## 2019-11-01 NOTE — PROGRESS NOTE ADULT - PROBLEM SELECTOR PLAN 4
likely multifactorial  including age    Thank you for consulting us and involving us in the management of this most interesting and challenging case.     Please Call with any further questions

## 2019-11-01 NOTE — PROGRESS NOTE ADULT - ASSESSMENT
89 yo F w/ PMH HTN, CHF, PxAfib who presented for evaluation of fall 10/29/19. Found to have marked bowel distension and NITA. Nephrology is consulted for NITA management.    NITA  - baseline Cr in 2018 is 1.1  - admitted w/ elevated Cr  - NITA 2/2 urinary obstruction  - Cr improving s/p macedo placement    - Avoid nephrotoxins including NSAIDs, IV contrast (if possible), Fleet's products  - maintain MAP >65    - monitor I/O's closely  - monitor for post-obstructive diuresis    Hematuria  - likely traumatic from large volume diuresis from relief of obstruction  - macedo catheter placed 10/30/19 w/ over 1000 mL urine output  - urology following for hematuria  - trend H/H    Hypokalemia  - likely 2/2 poor PO intake  - supplement as needed  - monitor    Proteinuria/hematuria  - likely due to UTI as this is new for her since admission  - repeat UA after Abx completed

## 2019-11-01 NOTE — CHART NOTE - NSCHARTNOTEFT_GEN_A_CORE
macdeo placed for retention 10/30 (900cc)- urine remains punch colored - spoke with urology regarding TOV - they recommend 3-5 days before attempting

## 2019-11-01 NOTE — PROGRESS NOTE ADULT - SUBJECTIVE AND OBJECTIVE BOX
[facility] [place]   MD Ivett Werner D.O. Fatima Sheikh, D.O. Ruoru Wong, PA    From 7 AM - 5 PM:  OFFICE: [phone]  Dr. Reyes cell: [phone]  Dr. Crouch cell: [phone]  Dr. Dumont cell: [phone]  CIERRA Vagras cell: [phone]    From 5 PM - 7 AM: Answering Service: [phone]        RENAL FOLLOW UP NOTE  --------------------------------------------------------------------------------  HPI: Pt seen and examined. Has no complaints this AM. Has persistent abdominal distension, no new/other symptoms this AM.        PAST HISTORY  --------------------------------------------------------------------------------  No significant changes to PMH, PSH, FHx, SHx, unless otherwise noted    ALLERGIES & MEDICATIONS  --------------------------------------------------------------------------------  Allergies    No Known Allergies    Intolerances      Standing Inpatient Medications  ascorbic acid 500 milliGRAM(s) Oral daily  chlorhexidine 4% Liquid 1 Application(s) Topical daily  digoxin     Tablet 0.125 milliGRAM(s) Oral daily  heparin  Injectable 5000 Unit(s) SubCutaneous every 12 hours  metoprolol succinate  milliGRAM(s) Oral daily  multivitamin 1 Tablet(s) Oral daily  potassium chloride   Solution 40 milliEquivalent(s) Oral two times a day  senna 2 Tablet(s) Oral at bedtime  simethicone 80 milliGRAM(s) Chew every 6 hours  sodium chloride 0.9%. 1000 milliLiter(s) IV Continuous <Continuous>    PRN Inpatient Medications  polyethylene glycol 3350 17 Gram(s) Oral daily PRN      REVIEW OF SYSTEMS  --------------------------------------------------------------------------------  General: no fever  CVS: no chest pain  RESP: no sob, no cough  ABD: no abdominal pain  : no dysuria,  MSK: no edema     VITALS/PHYSICAL EXAM  --------------------------------------------------------------------------------  T(C): 36.4 (11-01-19 @ 04:38), Max: 36.4 (11-01-19 @ 04:38)  HR: 55 (11-01-19 @ 04:38) (54 - 55)  BP: 132/71 (11-01-19 @ 04:38) (111/66 - 132/71)  RR: 18 (11-01-19 @ 04:38) (18 - 18)  SpO2: 97% (11-01-19 @ 04:38) (97% - 98%)  Wt(kg): --        10-31-19 @ 07:01  -  11-01-19 @ 07:00  --------------------------------------------------------  IN: 660 mL / OUT: 1015 mL / NET: -355 mL    11-01-19 @ 07:01  -  11-01-19 @ 12:33  --------------------------------------------------------  IN: 180 mL / OUT: 0 mL / NET: 180 mL      Physical Exam:  	Gen: NAD  	HEENT: MMM  	Pulm: CTA B/L  	CV: S1S2, + murmur  	Abd: Soft, +BS, Very distended abdomen  	Ext: No LE edema B/L              Neuro: Awake   	Skin: Warm and Dry     LABS/STUDIES  --------------------------------------------------------------------------------              13.3   17.16 >-----------<  323      [11-01-19 @ 08:45]              41.6     138  |  104  |  71  ----------------------------<  158      [11-01-19 @ 06:31]  3.5   |  20  |  1.27        Ca     8.8     [11-01-19 @ 06:31]      Mg     2.8     [11-01-19 @ 06:31]            Creatinine Trend:  SCr 1.27 [11-01 @ 06:31]  SCr 1.40 [10-31 @ 12:44]  SCr 1.56 [10-31 @ 05:21]  SCr 1.85 [10-30 @ 16:55]  SCr 1.91 [10-30 @ 06:40]    Urinalysis - [10-28-19 @ 08:24]      Color Yellow / Appearance Slightly Turbid / SG 1.017 / pH 8.5      Gluc Negative / Ketone Negative  / Bili Negative / Urobili Negative       Blood Moderate / Protein 30 mg/dL / Leuk Est Large / Nitrite Positive      RBC >50 / WBC 7 / Hyaline 0 / Gran  / Sq Epi  / Non Sq Epi 8 / Bacteria Moderate      HbA1c 7.8      [10-29-19 @ 13:12]

## 2019-11-02 LAB
ANION GAP SERPL CALC-SCNC: 11 MMOL/L — SIGNIFICANT CHANGE UP (ref 5–17)
BUN SERPL-MCNC: 57 MG/DL — HIGH (ref 7–23)
CALCIUM SERPL-MCNC: 8.9 MG/DL — SIGNIFICANT CHANGE UP (ref 8.4–10.5)
CHLORIDE SERPL-SCNC: 113 MMOL/L — HIGH (ref 96–108)
CO2 SERPL-SCNC: 13 MMOL/L — LOW (ref 22–31)
CREAT SERPL-MCNC: 1.06 MG/DL — SIGNIFICANT CHANGE UP (ref 0.5–1.3)
CULTURE RESULTS: SIGNIFICANT CHANGE UP
GLUCOSE BLDC GLUCOMTR-MCNC: 168 MG/DL — HIGH (ref 70–99)
GLUCOSE BLDC GLUCOMTR-MCNC: 175 MG/DL — HIGH (ref 70–99)
GLUCOSE BLDC GLUCOMTR-MCNC: 213 MG/DL — HIGH (ref 70–99)
GLUCOSE BLDC GLUCOMTR-MCNC: 255 MG/DL — HIGH (ref 70–99)
GLUCOSE BLDC GLUCOMTR-MCNC: 272 MG/DL — HIGH (ref 70–99)
GLUCOSE SERPL-MCNC: 175 MG/DL — HIGH (ref 70–99)
HCT VFR BLD CALC: 50.2 % — HIGH (ref 34.5–45)
HGB BLD-MCNC: 14.8 G/DL — SIGNIFICANT CHANGE UP (ref 11.5–15.5)
MAGNESIUM SERPL-MCNC: 2.9 MG/DL — HIGH (ref 1.6–2.6)
MCHC RBC-ENTMCNC: 29.5 GM/DL — LOW (ref 32–36)
MCHC RBC-ENTMCNC: 30.2 PG — SIGNIFICANT CHANGE UP (ref 27–34)
MCV RBC AUTO: 102.4 FL — HIGH (ref 80–100)
PLATELET # BLD AUTO: 337 K/UL — SIGNIFICANT CHANGE UP (ref 150–400)
POTASSIUM SERPL-MCNC: 4.4 MMOL/L — SIGNIFICANT CHANGE UP (ref 3.5–5.3)
POTASSIUM SERPL-SCNC: 4.4 MMOL/L — SIGNIFICANT CHANGE UP (ref 3.5–5.3)
RBC # BLD: 4.9 M/UL — SIGNIFICANT CHANGE UP (ref 3.8–5.2)
RBC # FLD: 14 % — SIGNIFICANT CHANGE UP (ref 10.3–14.5)
SODIUM SERPL-SCNC: 137 MMOL/L — SIGNIFICANT CHANGE UP (ref 135–145)
SPECIMEN SOURCE: SIGNIFICANT CHANGE UP
WBC # BLD: 15.26 K/UL — HIGH (ref 3.8–10.5)
WBC # FLD AUTO: 15.26 K/UL — HIGH (ref 3.8–10.5)

## 2019-11-02 PROCEDURE — 71045 X-RAY EXAM CHEST 1 VIEW: CPT | Mod: 26

## 2019-11-02 RX ORDER — DEXTROSE 50 % IN WATER 50 %
12.5 SYRINGE (ML) INTRAVENOUS ONCE
Refills: 0 | Status: DISCONTINUED | OUTPATIENT
Start: 2019-11-02 | End: 2019-11-06

## 2019-11-02 RX ORDER — INSULIN LISPRO 100/ML
VIAL (ML) SUBCUTANEOUS
Refills: 0 | Status: DISCONTINUED | OUTPATIENT
Start: 2019-11-02 | End: 2019-11-06

## 2019-11-02 RX ORDER — SODIUM CHLORIDE 9 MG/ML
1000 INJECTION, SOLUTION INTRAVENOUS
Refills: 0 | Status: DISCONTINUED | OUTPATIENT
Start: 2019-11-02 | End: 2019-11-06

## 2019-11-02 RX ORDER — DEXTROSE 50 % IN WATER 50 %
25 SYRINGE (ML) INTRAVENOUS ONCE
Refills: 0 | Status: DISCONTINUED | OUTPATIENT
Start: 2019-11-02 | End: 2019-11-06

## 2019-11-02 RX ORDER — GLUCAGON INJECTION, SOLUTION 0.5 MG/.1ML
1 INJECTION, SOLUTION SUBCUTANEOUS ONCE
Refills: 0 | Status: DISCONTINUED | OUTPATIENT
Start: 2019-11-02 | End: 2019-11-06

## 2019-11-02 RX ORDER — INSULIN LISPRO 100/ML
VIAL (ML) SUBCUTANEOUS AT BEDTIME
Refills: 0 | Status: DISCONTINUED | OUTPATIENT
Start: 2019-11-02 | End: 2019-11-06

## 2019-11-02 RX ORDER — IPRATROPIUM/ALBUTEROL SULFATE 18-103MCG
3 AEROSOL WITH ADAPTER (GRAM) INHALATION ONCE
Refills: 0 | Status: COMPLETED | OUTPATIENT
Start: 2019-11-02 | End: 2019-11-02

## 2019-11-02 RX ORDER — CITRIC ACID/SODIUM CITRATE 300-500 MG
10 SOLUTION, ORAL ORAL
Refills: 0 | Status: COMPLETED | OUTPATIENT
Start: 2019-11-02 | End: 2019-11-03

## 2019-11-02 RX ORDER — DEXTROSE 50 % IN WATER 50 %
15 SYRINGE (ML) INTRAVENOUS ONCE
Refills: 0 | Status: DISCONTINUED | OUTPATIENT
Start: 2019-11-02 | End: 2019-11-06

## 2019-11-02 RX ADMIN — Medication 100 MILLIGRAM(S): at 05:15

## 2019-11-02 RX ADMIN — SIMETHICONE 80 MILLIGRAM(S): 80 TABLET, CHEWABLE ORAL at 18:13

## 2019-11-02 RX ADMIN — Medication 40 MILLIEQUIVALENT(S): at 05:14

## 2019-11-02 RX ADMIN — Medication 0.12 MILLIGRAM(S): at 10:31

## 2019-11-02 RX ADMIN — Medication 3: at 12:09

## 2019-11-02 RX ADMIN — Medication 500 MILLIGRAM(S): at 11:39

## 2019-11-02 RX ADMIN — CHLORHEXIDINE GLUCONATE 1 APPLICATION(S): 213 SOLUTION TOPICAL at 11:40

## 2019-11-02 RX ADMIN — SENNA PLUS 2 TABLET(S): 8.6 TABLET ORAL at 21:47

## 2019-11-02 RX ADMIN — SIMETHICONE 80 MILLIGRAM(S): 80 TABLET, CHEWABLE ORAL at 11:39

## 2019-11-02 RX ADMIN — HEPARIN SODIUM 5000 UNIT(S): 5000 INJECTION INTRAVENOUS; SUBCUTANEOUS at 05:15

## 2019-11-02 RX ADMIN — HEPARIN SODIUM 5000 UNIT(S): 5000 INJECTION INTRAVENOUS; SUBCUTANEOUS at 18:13

## 2019-11-02 RX ADMIN — Medication 3 MILLILITER(S): at 16:37

## 2019-11-02 RX ADMIN — SIMETHICONE 80 MILLIGRAM(S): 80 TABLET, CHEWABLE ORAL at 21:46

## 2019-11-02 RX ADMIN — Medication 1: at 18:18

## 2019-11-02 RX ADMIN — Medication 40 MILLIEQUIVALENT(S): at 18:13

## 2019-11-02 RX ADMIN — Medication 1 TABLET(S): at 11:39

## 2019-11-02 RX ADMIN — SIMETHICONE 80 MILLIGRAM(S): 80 TABLET, CHEWABLE ORAL at 01:13

## 2019-11-02 RX ADMIN — SIMETHICONE 80 MILLIGRAM(S): 80 TABLET, CHEWABLE ORAL at 05:15

## 2019-11-02 NOTE — PROGRESS NOTE ADULT - ASSESSMENT
89 yo F w/ PMH HTN, CHF, PxAfib who presented for evaluation of fall 10/29/19. Found to have marked bowel distension and NITA. Nephrology is consulted for NITA management.    NITA  - baseline Cr in 2018 is 1.1  - admitted w/ elevated Cr  - NITA 2/2 urinary obstruction  - Pt non-oliguric with good urine output   - Cr improving s/p macedo placement    - Avoid nephrotoxins including NSAIDs, IV contrast (if possible), Fleet's products  - maintain MAP >65    - monitor I/O's closely  - monitor for post-obstructive diuresis    Hematuria  - likely traumatic from large volume diuresis from relief of obstruction  - macedo catheter placed 10/30/19 w/ over 1000 mL urine output  - urology following for hematuria  - trend H/H    Hypokalemia  - likely 2/2 poor PO intake  - Improved today  - monitor    Proteinuria/hematuria  - likely due to UTI as this is new for her since admission  - repeat UA after Abx completed    Acidosis  Worse today   ? GI losses   Recommend Bicitra 10cc PO BID today

## 2019-11-02 NOTE — SWALLOW BEDSIDE ASSESSMENT ADULT - SWALLOW EVAL: RECOMMENDED FEEDING/EATING TECHNIQUES
If pt/family wish to continue an oral diet despite the risks of aspiration, MD/team Please enter the following as notes to RN: 1) Full assist with meals, 2) Crush meds or provide via alternate source, 3) ALL PO via teaspoon, 4) Provide small single bites and sips at slow rate, 5) Encourage successive swallows for oral and pharyngeal clearance, 6) Alternate food and liquids to aid in oral and pharyngeal clearance, 7) Stop feeding if pt exhibits coughing/increased RR 8) Upright positioning to at least 45 degrees as tolerated given abdominal distention 9) Upright positioning after eating for 30-60 minutes. 10) Aspiration precautions. Monitor for s/s aspiration/laryngeal penetration. If noted:  D/C p.o. intake, provide non-oral nutrition/hydration/meds
maintain upright posture during/after eating for 30 mins/small sips/bites/pace slowly with regard for RR/crush medication (when feasible)/position upright (90 degrees)

## 2019-11-02 NOTE — SWALLOW BEDSIDE ASSESSMENT ADULT - ASR SWALLOW RECOMMEND DIAG
MBS option presented to family who wish to defer at this time Option of MBS presented to family who wish to defer at this time

## 2019-11-02 NOTE — SWALLOW BEDSIDE ASSESSMENT ADULT - ASPIRATION PRECAUTIONS
strict aspiration precautions/yes
Monitor for s/s aspiration/laryngeal penetration. If noted:  D/C p.o. intake, provide non-oral nutrition/hydration/meds, and contact this service @ [phone]

## 2019-11-02 NOTE — SWALLOW BEDSIDE ASSESSMENT ADULT - SLP PRECAUTIONS/LIMITATIONS: VISION
impaired/Partially impaired: cannot see medication labels or newsprint, but can see obstacles in path, and the surrounding layout; can count fingers at arm's length
impaired/Partially impaired: cannot see medication labels or newsprint, but can see obstacles in path, and the surrounding layout; can count fingers at arm's length

## 2019-11-02 NOTE — SWALLOW BEDSIDE ASSESSMENT ADULT - SWALLOW EVAL: DIAGNOSIS
Pt with change in swallow function from previous assessment. Pt presents with a suspected pharyngeal dysphagia superimposed upon reduced coordination between respiration and deglutition with baseline RR 32. Swallow sequence characterized by delay in trigger of the pharyngeal swallow and overt s/s laryngeal penetration/aspiration across consistencies, most significant with thin liquid. Given worsening RR from previous assessment (which is greater than 30) and overt s/s laryngeal penetration/aspiration across consistencies pt deemed safest NPO, with non-oral nutrition/hydration/medications. Pt with change in swallow function from previous assessment. Pt presents with a suspected pharyngeal dysphagia superimposed upon reduced coordination between respiration and deglutition with baseline RR 32. Swallow sequence characterized by delay in trigger of the pharyngeal swallow, reduced hyolaryngeal elevation upon palpation and overt s/s laryngeal penetration/aspiration across consistencies (coughing increased in frequency from baseline cough), most significant with thin liquid. Given worsening RR from previous assessment (which is greater than 30) and overt s/s laryngeal penetration/aspiration across consistencies pt deemed safest NPO, with non-oral nutrition/hydration/medications. Pt with change in swallow function from previous assessment. Pt presents with a suspected pharyngeal dysphagia superimposed upon reduced coordination between respiration and deglutition with baseline RR 32. Swallow sequence characterized by delay in trigger of the pharyngeal swallow, reduced hyolaryngeal elevation upon palpation and overt s/s laryngeal penetration/aspiration across consistencies (pt with baseline cough but cough increased in frequency post PO intake), coughing was most significant with thin liquid. Given worsening RR from previous assessment (with RR >30) and overt s/s laryngeal penetration/aspiration across consistencies pt deemed safest NPO, with non-oral nutrition/hydration/medications.

## 2019-11-02 NOTE — PROGRESS NOTE ADULT - ASSESSMENT
89 yo female with pmhx of HTN, HFpEF, chr afib who has been experiencing worsening weakness x 2 wks, s/p mech fall yesterday, CT and xrays neg. unable to ambulate.    # Fall/weakness  PT eval  prn tylenol for pain  S&S recs pureed texture diet    # leukocytosis  acute on chronic leukocytosis likely 2/2 urinary retention, now downtrending  appreciate ID recs, will observe off ABX, no signs of infection  urine and blood cx neg  repeat CT shows no change in colonic distention - will not pursue further investigation as family does not want aggressive intervention    # NITA  2/2 urinary retention and mod right hydro  >900cc urine in bladder on US, sp macedo placement for bladder decompression  hematuria likely 2/2 rapid bladder decompression, appreciate urology recs, no indication for CBI  will TOV monday   hold thiazide and furosemide  appreciate renal recs  standing k supplement    #elev BS, new DM  elev hba1c 7.8  will be conservative given advance age  monitor bld sugars    #CHF  last echo aug 2018- normal study- normal LV systolic function  hold home diuretics in the setting of hyponatremia and NITA  cont digoxin  mild trops d/t elev cr, ekg reviewed-sinus bakari 57    # HTN  cont metoprolol, hold lasix and dyazide    #GOC - dw dgtr and no aggressive measures  DNR/DNI    [facility]  [phone]    dispo: TOV monday

## 2019-11-02 NOTE — SWALLOW BEDSIDE ASSESSMENT ADULT - SWALLOW EVAL: RECOMMENDED DIET
NPO, with non-oral nutrition, hydration, medications is recommended based upon the results of this examination; however, pt/family expressed to this service they wish to continue an oral diet despite the risks of aspiration. A conservative diet option may be Dysphagia 1 with honey thick liquid via teaspoon NPO, with non-oral nutrition, hydration, medications is recommended based upon the results of this examination; however, pt/family expressed to this service they wish for this 89 y/o pt to continue an oral diet despite the risks of aspiration. A conservative diet option may be Dysphagia 1 with honey thick liquid via teaspoon

## 2019-11-02 NOTE — SWALLOW BEDSIDE ASSESSMENT ADULT - SWALLOW EVAL: PATIENT/FAMILY GOALS STATEMENT
Pt's daughter reports pt with good intake up until ~2weeks ago when she became weakened and was unable to feed herself. PTA pt had been able to cut food up independently and self feed.
Daughters report pt with increased baseline cough that they noted today. Reports pt tolerated pureed diet without difficulty.

## 2019-11-02 NOTE — SWALLOW BEDSIDE ASSESSMENT ADULT - ASR SWALLOW DENTITION
some natural dentition, mission upper and lower molars
some natural dentition, mission upper and lower molars

## 2019-11-02 NOTE — PROGRESS NOTE ADULT - SUBJECTIVE AND OBJECTIVE BOX
[facility] – [place] [place] [facility]   MD HARVINDER LOCO D.O, PA    TELEPHONE NUMBERS:  OFFICE: [phone]  DR. BEATTY CELL: [phone]  SRIDHAR NORTON CELL: [phone]  DR. MURO CELL:  [phone]    RENAL FOLLOW UP NOTE  --------------------------------------------------------------------------------  HPI: Pt seen and examined at bedside.   Sidney LOPEZ, chest pain     PAST HISTORY  --------------------------------------------------------------------------------  No significant changes to PMH, PSH, FHx, SHx, unless otherwise noted    ALLERGIES & MEDICATIONS  --------------------------------------------------------------------------------  Allergies    No Known Allergies    Intolerances      Standing Inpatient Medications  ascorbic acid 500 milliGRAM(s) Oral daily  chlorhexidine 4% Liquid 1 Application(s) Topical daily  digoxin     Tablet 0.125 milliGRAM(s) Oral daily  heparin  Injectable 5000 Unit(s) SubCutaneous every 12 hours  metoprolol succinate  milliGRAM(s) Oral daily  multivitamin 1 Tablet(s) Oral daily  potassium chloride   Solution 40 milliEquivalent(s) Oral two times a day  senna 2 Tablet(s) Oral at bedtime  simethicone 80 milliGRAM(s) Chew every 6 hours  sodium chloride 0.9%. 1000 milliLiter(s) IV Continuous <Continuous>    PRN Inpatient Medications  polyethylene glycol 3350 17 Gram(s) Oral daily PRN      REVIEW OF SYSTEMS  --------------------------------------------------------------------------------  General: no fever  CVS: no chest pain  RESP: no sob, no cough  ABD: no abdominal pain  : no dysuria  MSK: no edema     VITALS/PHYSICAL EXAM  --------------------------------------------------------------------------------  T(C): 36.6 (11-02-19 @ 05:11), Max: 36.6 (11-02-19 @ 05:11)  HR: 51 (11-02-19 @ 05:11) (51 - 55)  BP: 133/77 (11-02-19 @ 05:11) (131/72 - 139/61)  RR: 18 (11-02-19 @ 05:11) (18 - 18)  SpO2: 98% (11-02-19 @ 05:11) (98% - 99%)  Wt(kg): --      11-01-19 @ 07:01  -  11-02-19 @ 07:00  --------------------------------------------------------  IN: 850 mL / OUT: 1000 mL / NET: -150 mL      Physical Exam:  	Gen: NAD  	HEENT: MMM  	Pulm: CTA B/L  	CV: S1S2  	Abd: Soft, +BS + distended  	Ext: No LE edema B/L                      Neuro: Awake   	Skin: Warm and Dry     LABS/STUDIES  --------------------------------------------------------------------------------              13.3   17.16 >-----------<  323      [11-01-19 @ 08:45]              41.6     137  |  113  |  57  ----------------------------<  175      [11-02-19 @ 07:01]  4.4   |  13  |  1.06        Ca     8.9     [11-02-19 @ 07:01]      Mg     2.9     [11-02-19 @ 07:01]      Creatinine Trend:  SCr 1.06 [11-02 @ 07:01]  SCr 1.27 [11-01 @ 06:31]  SCr 1.40 [10-31 @ 12:44]  SCr 1.56 [10-31 @ 05:21]  SCr 1.85 [10-30 @ 16:55]    Urinalysis - [10-28-19 @ 08:24]      Color Yellow / Appearance Slightly Turbid / SG 1.017 / pH 8.5      Gluc Negative / Ketone Negative  / Bili Negative / Urobili Negative       Blood Moderate / Protein 30 mg/dL / Leuk Est Large / Nitrite Positive      RBC >50 / WBC 7 / Hyaline 0 / Gran  / Sq Epi  / Non Sq Epi 8 / Bacteria Moderate      HbA1c 7.8      [10-29-19 @ 13:12]

## 2019-11-02 NOTE — SWALLOW BEDSIDE ASSESSMENT ADULT - CONSISTENCIES ADMINISTERED
thin liquid/x1 trial honey thick/puree thin/puree thin x2 trials; honey thick liquid x2 trials nectar thick

## 2019-11-02 NOTE — SWALLOW BEDSIDE ASSESSMENT ADULT - PHARYNGEAL PHASE
Cough post oral intake/Delayed pharyngeal swallow/Delayed cough post oral intake Cough post oral intake/Delayed cough post oral intake/Decreased laryngeal elevation/+ audible wheeze noted/Delayed pharyngeal swallow Decreased laryngeal elevation/Wet vocal quality post oral intake/Cough post oral intake/Delayed cough post oral intake/+ audible wheeze noted/Delayed pharyngeal swallow

## 2019-11-02 NOTE — PROGRESS NOTE ADULT - SUBJECTIVE AND OBJECTIVE BOX
Patient is a 89y old  Female who presents with a chief complaint of sp fall unable to ambulate (01 Nov 2019 12:32)      SUBJECTIVE / OVERNIGHT EVENTS:    Patient seen and examined. still blood tinged urine. no acute events. pt poor historian.      Vital Signs Last 24 Hrs  T(C): 36.6 (02 Nov 2019 05:11), Max: 36.6 (02 Nov 2019 05:11)  T(F): 97.9 (02 Nov 2019 05:11), Max: 97.9 (02 Nov 2019 05:11)  HR: 51 (02 Nov 2019 05:11) (51 - 55)  BP: 133/77 (02 Nov 2019 05:11) (131/72 - 139/61)  BP(mean): --  RR: 18 (02 Nov 2019 05:11) (18 - 18)  SpO2: 98% (02 Nov 2019 05:11) (98% - 99%)  I&O's Summary    01 Nov 2019 07:01  -  02 Nov 2019 07:00  --------------------------------------------------------  IN: 850 mL / OUT: 1000 mL / NET: -150 mL        PE:  GENERAL: NAD, AAOx2, frail  HEAD:  Atraumatic, Normocephalic  EYES: EOMI, PERRLA, conjunctiva and sclera clear  NECK: Supple, No JVD  CHEST/LUNG: CTABL, No wheeze  HEART: Regular rate and rhythm; no murmur  ABDOMEN: Soft, Nontender, distended; Bowel sounds present  gu: macedo blood tinged urine not clots  EXTREMITIES:  2+ Peripheral Pulses, +1 le edema  SKIN: No rashes or lesions  NEURO: No focal deficits    LABS:                        13.3   17.16 )-----------( 323      ( 01 Nov 2019 08:45 )             41.6     11-02    137  |  113<H>  |  57<H>  ----------------------------<  175<H>  4.4   |  13<L>  |  1.06    Ca    8.9      02 Nov 2019 07:01  Mg     2.9     11-02        CAPILLARY BLOOD GLUCOSE      POCT Blood Glucose.: 168 mg/dL (02 Nov 2019 08:14)  POCT Blood Glucose.: 155 mg/dL (01 Nov 2019 21:42)            RADIOLOGY & ADDITIONAL TESTS:    Imaging Personally Reviewed:  [x] YES  [ ] NO    Consultant(s) Notes Reviewed:  [x] YES  [ ] NO    MEDICATIONS  (STANDING):  ascorbic acid 500 milliGRAM(s) Oral daily  chlorhexidine 4% Liquid 1 Application(s) Topical daily  digoxin     Tablet 0.125 milliGRAM(s) Oral daily  heparin  Injectable 5000 Unit(s) SubCutaneous every 12 hours  metoprolol succinate  milliGRAM(s) Oral daily  multivitamin 1 Tablet(s) Oral daily  potassium chloride   Solution 40 milliEquivalent(s) Oral two times a day  senna 2 Tablet(s) Oral at bedtime  simethicone 80 milliGRAM(s) Chew every 6 hours  sodium chloride 0.9%. 1000 milliLiter(s) (100 mL/Hr) IV Continuous <Continuous>    MEDICATIONS  (PRN):  polyethylene glycol 3350 17 Gram(s) Oral daily PRN Constipation      Care Discussed with Consultants/Other Providers [x] YES  [ ] NO    HEALTH ISSUES - PROBLEM Dx:  Weakness: Weakness  NITA (acute kidney injury): NITA (acute kidney injury)  Abdominal distension: Abdominal distension  Leukocytosis: Leukocytosis

## 2019-11-02 NOTE — SWALLOW BEDSIDE ASSESSMENT ADULT - ADDITIONAL RECOMMENDATIONS
Maintain good oral hygiene.   Extensive discussion was has with pt/pt's daughters re: bedside swallow evaluation findings and recommendations. Pt's daughters expressed to this service they wish for pt to continue on an oral diet despite the risks of aspiration. Do not wish to pursue feeding tube at this time. Risks of aspiration (including pna, respiratory distress fatality) were explained. Pt/family made aware of a conservative diet option which may help to reduce but does not eliminate the risk of aspiration. Family verbalized understanding of the same. GERMAN Vargas and NP Ly aware of above. Maintain good oral hygiene.   Extensive discussion was had with pt/pt's daughters re: bedside swallow evaluation findings and recommendations. Pt's daughters expressed to this service they wish for pt to continue on an oral diet despite the risks of aspiration. Do not wish to pursue feeding tube at this time. Risks of aspiration (including pna, respiratory distress, death) were explained. Pt/family made aware of a conservative diet option which may help to reduce but does not eliminate the risk of aspiration. Family verbalized understanding of the same. GERMAN Vargas and NP Ly aware of above.

## 2019-11-02 NOTE — SWALLOW BEDSIDE ASSESSMENT ADULT - COMMENTS
Per attending note, coughs w liquids. #UTI: (positive UA, leucocytosis) will switch to ertepenem as wbc worsening, fu ucx, bld cx ngtd, renal sono, ID cs. Leucocytosis: ?UTI related vs other.   CXR: Low lung volumes. Platelike atelectasis left lower lobe. 10/27 CT A/P: Marked distention of the colon without definite transition point or evidence of sigmoid volvulus. This appears slightly worse than on previous pelvic x-ray of 2015 and may reflect chronic bowel dilatation. See imaging tab for CT Head and Cspine results. Xray: 2 small ossific densities posterior to the olecranon measuring up to 5 mm, with mild adjacent soft tissue swelling. There is also a subtle linear lucency in the posterior superior olecranon concerning for small nondisplaced fracture, and possible small elbow effusion. These findings are concerning for acute traumatic injury; CT or MRI of the elbow should be considered for further detail given history of fall.
Per attending note, coughs w liquids. #UTI: (positive UA, leucocytosis) will switch to ertepenem as wbc worsening, fu ucx, bld cx ngtd, renal sono, ID cs. Leucocytosis: ?UTI related vs other.   CXR: Low lung volumes. Platelike atelectasis left lower lobe. 10/27 CT A/P: Marked distention of the colon without definite transition point or evidence of sigmoid volvulus. This appears slightly worse than on previous pelvic x-ray of 2015 and may reflect chronic bowel dilatation. See imaging tab for CT Head and Cspine results. Xray: 2 small ossific densities posterior to the olecranon measuring up to 5 mm, with mild adjacent soft tissue swelling. There is also a subtle linear lucency in the posterior superior olecranon concerning for small nondisplaced fracture, and possible small elbow effusion. These findings are concerning for acute traumatic injury; CT or MRI of the elbow should be considered for further detail given history of fall. Medicine: GOC - dw dgtr and no aggressive measures; made pt DNR/DNI. + hematuria, no indication for CBI per Urology. ID: has had leukocytosis in sunrise appears to be chronic, blood and urine cx negative, no clear infectious etiology driving leukocytosis, monitor off antibx.

## 2019-11-02 NOTE — SWALLOW BEDSIDE ASSESSMENT ADULT - DIET PRIOR TO ADMI
Regular texture diet, as per pt's daughters at the bedside
Regular texture diet, as per pt's daughters at the bedside

## 2019-11-03 LAB
ANION GAP SERPL CALC-SCNC: 10 MMOL/L — SIGNIFICANT CHANGE UP (ref 5–17)
BUN SERPL-MCNC: 55 MG/DL — HIGH (ref 7–23)
CALCIUM SERPL-MCNC: 9.2 MG/DL — SIGNIFICANT CHANGE UP (ref 8.4–10.5)
CHLORIDE SERPL-SCNC: 107 MMOL/L — SIGNIFICANT CHANGE UP (ref 96–108)
CO2 SERPL-SCNC: 21 MMOL/L — LOW (ref 22–31)
CREAT SERPL-MCNC: 1.23 MG/DL — SIGNIFICANT CHANGE UP (ref 0.5–1.3)
GLUCOSE BLDC GLUCOMTR-MCNC: 177 MG/DL — HIGH (ref 70–99)
GLUCOSE BLDC GLUCOMTR-MCNC: 183 MG/DL — HIGH (ref 70–99)
GLUCOSE BLDC GLUCOMTR-MCNC: 184 MG/DL — HIGH (ref 70–99)
GLUCOSE BLDC GLUCOMTR-MCNC: 202 MG/DL — HIGH (ref 70–99)
GLUCOSE SERPL-MCNC: 200 MG/DL — HIGH (ref 70–99)
HCT VFR BLD CALC: 41.4 % — SIGNIFICANT CHANGE UP (ref 34.5–45)
HGB BLD-MCNC: 13.8 G/DL — SIGNIFICANT CHANGE UP (ref 11.5–15.5)
MCHC RBC-ENTMCNC: 30.4 PG — SIGNIFICANT CHANGE UP (ref 27–34)
MCHC RBC-ENTMCNC: 33.3 GM/DL — SIGNIFICANT CHANGE UP (ref 32–36)
MCV RBC AUTO: 91.2 FL — SIGNIFICANT CHANGE UP (ref 80–100)
NRBC # BLD: 0 /100 WBCS — SIGNIFICANT CHANGE UP (ref 0–0)
PLATELET # BLD AUTO: 301 K/UL — SIGNIFICANT CHANGE UP (ref 150–400)
POTASSIUM SERPL-MCNC: 4.1 MMOL/L — SIGNIFICANT CHANGE UP (ref 3.5–5.3)
POTASSIUM SERPL-SCNC: 4.1 MMOL/L — SIGNIFICANT CHANGE UP (ref 3.5–5.3)
RBC # BLD: 4.54 M/UL — SIGNIFICANT CHANGE UP (ref 3.8–5.2)
RBC # FLD: 14 % — SIGNIFICANT CHANGE UP (ref 10.3–14.5)
SODIUM SERPL-SCNC: 138 MMOL/L — SIGNIFICANT CHANGE UP (ref 135–145)
WBC # BLD: 18.36 K/UL — HIGH (ref 3.8–10.5)
WBC # FLD AUTO: 18.36 K/UL — HIGH (ref 3.8–10.5)

## 2019-11-03 RX ADMIN — Medication 500 MILLIGRAM(S): at 11:15

## 2019-11-03 RX ADMIN — Medication 40 MILLIEQUIVALENT(S): at 17:17

## 2019-11-03 RX ADMIN — Medication 40 MILLIEQUIVALENT(S): at 05:30

## 2019-11-03 RX ADMIN — SIMETHICONE 80 MILLIGRAM(S): 80 TABLET, CHEWABLE ORAL at 05:30

## 2019-11-03 RX ADMIN — SIMETHICONE 80 MILLIGRAM(S): 80 TABLET, CHEWABLE ORAL at 11:14

## 2019-11-03 RX ADMIN — Medication 100 MILLIGRAM(S): at 05:30

## 2019-11-03 RX ADMIN — Medication 1: at 18:13

## 2019-11-03 RX ADMIN — SIMETHICONE 80 MILLIGRAM(S): 80 TABLET, CHEWABLE ORAL at 17:17

## 2019-11-03 RX ADMIN — HEPARIN SODIUM 5000 UNIT(S): 5000 INJECTION INTRAVENOUS; SUBCUTANEOUS at 05:30

## 2019-11-03 RX ADMIN — Medication 10 MILLILITER(S): at 18:30

## 2019-11-03 RX ADMIN — HEPARIN SODIUM 5000 UNIT(S): 5000 INJECTION INTRAVENOUS; SUBCUTANEOUS at 17:17

## 2019-11-03 RX ADMIN — Medication 10 MILLILITER(S): at 05:30

## 2019-11-03 RX ADMIN — Medication 1: at 09:02

## 2019-11-03 RX ADMIN — CHLORHEXIDINE GLUCONATE 1 APPLICATION(S): 213 SOLUTION TOPICAL at 11:13

## 2019-11-03 RX ADMIN — Medication 0.12 MILLIGRAM(S): at 05:30

## 2019-11-03 RX ADMIN — Medication 1 TABLET(S): at 11:15

## 2019-11-03 RX ADMIN — Medication 2: at 12:30

## 2019-11-03 NOTE — PROGRESS NOTE ADULT - SUBJECTIVE AND OBJECTIVE BOX
Patient is a 89y old  Female who presents with a chief complaint of sp fall unable to ambulate (02 Nov 2019 09:41)      SUBJECTIVE / OVERNIGHT EVENTS:    Patient seen and examined.   co fatigue.    Vital Signs Last 24 Hrs  T(C): 36.3 (03 Nov 2019 05:15), Max: 36.8 (02 Nov 2019 20:41)  T(F): 97.4 (03 Nov 2019 05:15), Max: 98.2 (02 Nov 2019 20:41)  HR: 56 (03 Nov 2019 05:15) (56 - 66)  BP: 120/68 (03 Nov 2019 05:15) (106/61 - 120/68)  BP(mean): --  RR: 18 (03 Nov 2019 05:15) (18 - 20)  SpO2: 95% (03 Nov 2019 05:15) (95% - 97%)  I&O's Summary    02 Nov 2019 08:01  -  03 Nov 2019 07:00  --------------------------------------------------------  IN: 680 mL / OUT: 950 mL / NET: -270 mL        PE:  GENERAL: NAD, AAOx2, frail  HEAD:  Atraumatic, Normocephalic  EYES: EOMI, PERRLA, conjunctiva and sclera clear  NECK: Supple, No JVD  CHEST/LUNG: CTABL, No wheeze  HEART: Regular rate and rhythm; no murmur  ABDOMEN: Soft, Nontender, distended; Bowel sounds present  gu: macedo blood tinged urine not clots  EXTREMITIES:  2+ Peripheral Pulses, +1 le edema  SKIN: No rashes or lesions  NEURO: No focal deficits    LABS:                        14.8   15.26 )-----------( 337      ( 02 Nov 2019 10:23 )             50.2     11-02    137  |  113<H>  |  57<H>  ----------------------------<  175<H>  4.4   |  13<L>  |  1.06    Ca    8.9      02 Nov 2019 07:01  Mg     2.9     11-02        CAPILLARY BLOOD GLUCOSE      POCT Blood Glucose.: 183 mg/dL (03 Nov 2019 08:25)  POCT Blood Glucose.: 213 mg/dL (02 Nov 2019 21:40)  POCT Blood Glucose.: 175 mg/dL (02 Nov 2019 17:53)  POCT Blood Glucose.: 255 mg/dL (02 Nov 2019 11:57)  POCT Blood Glucose.: 272 mg/dL (02 Nov 2019 11:56)            RADIOLOGY & ADDITIONAL TESTS:    Imaging Personally Reviewed:  [x] YES  [ ] NO    Consultant(s) Notes Reviewed:  [x] YES  [ ] NO    MEDICATIONS  (STANDING):  ascorbic acid 500 milliGRAM(s) Oral daily  chlorhexidine 4% Liquid 1 Application(s) Topical daily  citric acid/sodium citrate Solution 10 milliLiter(s) Oral two times a day  dextrose 5%. 1000 milliLiter(s) (50 mL/Hr) IV Continuous <Continuous>  dextrose 50% Injectable 12.5 Gram(s) IV Push once  dextrose 50% Injectable 25 Gram(s) IV Push once  dextrose 50% Injectable 25 Gram(s) IV Push once  digoxin     Tablet 0.125 milliGRAM(s) Oral daily  heparin  Injectable 5000 Unit(s) SubCutaneous every 12 hours  insulin lispro (HumaLOG) corrective regimen sliding scale   SubCutaneous three times a day before meals  insulin lispro (HumaLOG) corrective regimen sliding scale   SubCutaneous at bedtime  metoprolol succinate  milliGRAM(s) Oral daily  multivitamin 1 Tablet(s) Oral daily  potassium chloride   Solution 40 milliEquivalent(s) Oral two times a day  senna 2 Tablet(s) Oral at bedtime  simethicone 80 milliGRAM(s) Chew every 6 hours  sodium chloride 0.9%. 1000 milliLiter(s) (100 mL/Hr) IV Continuous <Continuous>    MEDICATIONS  (PRN):  dextrose 40% Gel 15 Gram(s) Oral once PRN Blood Glucose LESS THAN 70 milliGRAM(s)/deciliter  glucagon  Injectable 1 milliGRAM(s) IntraMuscular once PRN Glucose LESS THAN 70 milligrams/deciliter  polyethylene glycol 3350 17 Gram(s) Oral daily PRN Constipation      Care Discussed with Consultants/Other Providers [x] YES  [ ] NO    HEALTH ISSUES - PROBLEM Dx:  Weakness: Weakness  NITA (acute kidney injury): NITA (acute kidney injury)  Abdominal distension: Abdominal distension  Leukocytosis: Leukocytosis

## 2019-11-03 NOTE — PROGRESS NOTE ADULT - ASSESSMENT
89 yo female with pmhx of HTN, HFpEF, chr afib who has been experiencing worsening weakness x 2 wks, s/p mech fall yesterday, CT and xrays neg. unable to ambulate.    # Fall/weakness  PT eval  prn tylenol for pain  S&S recs pureed texture diet    # leukocytosis  acute on chronic leukocytosis likely 2/2 urinary retention, now downtrending  appreciate ID recs, will observe off ABX, no signs of infection  urine and blood cx neg  repeat CT shows no change in colonic distention - will not pursue further investigation as family does not want aggressive intervention    # NITA  2/2 urinary retention and mod right hydro  >900cc urine in bladder on US, sp macedo placement for bladder decompression  hematuria likely 2/2 rapid bladder decompression, appreciate urology recs, no indication for CBI  will TOV monday   hold thiazide and furosemide  appreciate renal recs  standing k supplement    #elev BS, new DM  elev hba1c 7.8  will be conservative given advance age  monitor bld sugars    #CHF  last echo aug 2018- normal study- normal LV systolic function  hold home diuretics in the setting of hyponatremia and NITA  cont digoxin  mild trops d/t elev cr, ekg reviewed-sinus bakari 57    # HTN  cont metoprolol, hold lasix and dyazide    #GOC - dw dgtr and no aggressive measures  DNR/DNI    [facility]  [phone]    dispo: TOV monday

## 2019-11-03 NOTE — PROGRESS NOTE ADULT - SUBJECTIVE AND OBJECTIVE BOX
[facility]   MD HARVINDER LOCO D.O, PA    TELEPHONE NUMBERS:  OFFICE: [phone]  DR. BEATTY CELL: [phone]  SRIDHAR NORTON CELL: [phone]  DR. MURO CELL:  [phone]    RENAL FOLLOW UP NOTE  --------------------------------------------------------------------------------  HPI: Pt seen and examined at bedside.   Sidney SOB, chest pain     PAST HISTORY  --------------------------------------------------------------------------------  No significant changes to PMH, PSH, FHx, SHx, unless otherwise noted    ALLERGIES & MEDICATIONS  --------------------------------------------------------------------------------  Allergies    No Known Allergies    Intolerances      Standing Inpatient Medications  ascorbic acid 500 milliGRAM(s) Oral daily  chlorhexidine 4% Liquid 1 Application(s) Topical daily  citric acid/sodium citrate Solution 10 milliLiter(s) Oral two times a day  dextrose 5%. 1000 milliLiter(s) IV Continuous <Continuous>  dextrose 50% Injectable 12.5 Gram(s) IV Push once  dextrose 50% Injectable 25 Gram(s) IV Push once  dextrose 50% Injectable 25 Gram(s) IV Push once  digoxin     Tablet 0.125 milliGRAM(s) Oral daily  heparin  Injectable 5000 Unit(s) SubCutaneous every 12 hours  insulin lispro (HumaLOG) corrective regimen sliding scale   SubCutaneous three times a day before meals  insulin lispro (HumaLOG) corrective regimen sliding scale   SubCutaneous at bedtime  metoprolol succinate  milliGRAM(s) Oral daily  multivitamin 1 Tablet(s) Oral daily  potassium chloride   Solution 40 milliEquivalent(s) Oral two times a day  senna 2 Tablet(s) Oral at bedtime  simethicone 80 milliGRAM(s) Chew every 6 hours  sodium chloride 0.9%. 1000 milliLiter(s) IV Continuous <Continuous>    PRN Inpatient Medications  dextrose 40% Gel 15 Gram(s) Oral once PRN  glucagon  Injectable 1 milliGRAM(s) IntraMuscular once PRN  polyethylene glycol 3350 17 Gram(s) Oral daily PRN      REVIEW OF SYSTEMS  --------------------------------------------------------------------------------  General: no fever  CVS: no chest pain  RESP: no sob, no cough  ABD: no abdominal pain  : no dysuria  MSK: no edema     VITALS/PHYSICAL EXAM  --------------------------------------------------------------------------------  T(C): 36.3 (11-03-19 @ 05:15), Max: 36.8 (11-02-19 @ 20:41)  HR: 56 (11-03-19 @ 05:15) (56 - 66)  BP: 120/68 (11-03-19 @ 05:15) (106/61 - 120/68)  RR: 18 (11-03-19 @ 05:15) (18 - 20)  SpO2: 95% (11-03-19 @ 05:15) (95% - 97%)  Wt(kg): --        11-02-19 @ 08:01  -  11-03-19 @ 07:00  --------------------------------------------------------  IN: 680 mL / OUT: 950 mL / NET: -270 mL      Physical Exam:  	Gen: NAD  	HEENT: MMM  	Pulm: CTA B/L  	CV: S1S2  	Abd: Soft, +BS + distended  	Ext: No LE edema B/L                      Neuro: Awake   	Skin: Warm and Dry   	    LABS/STUDIES  --------------------------------------------------------------------------------              13.8   18.36 >-----------<  301      [11-03-19 @ 08:37]              41.4     138  |  107  |  55  ----------------------------<  200      [11-03-19 @ 08:37]  4.1   |  21  |  1.23        Ca     9.2     [11-03-19 @ 08:37]      Mg     2.9     [11-02-19 @ 07:01]      Creatinine Trend:  SCr 1.23 [11-03 @ 08:37]  SCr 1.06 [11-02 @ 07:01]  SCr 1.27 [11-01 @ 06:31]  SCr 1.40 [10-31 @ 12:44]  SCr 1.56 [10-31 @ 05:21]    Urinalysis - [10-28-19 @ 08:24]      Color Yellow / Appearance Slightly Turbid / SG 1.017 / pH 8.5      Gluc Negative / Ketone Negative  / Bili Negative / Urobili Negative       Blood Moderate / Protein 30 mg/dL / Leuk Est Large / Nitrite Positive      RBC >50 / WBC 7 / Hyaline 0 / Gran  / Sq Epi  / Non Sq Epi 8 / Bacteria Moderate      HbA1c 7.8      [10-29-19 @ 13:12]

## 2019-11-03 NOTE — PROGRESS NOTE ADULT - ASSESSMENT
89 yo F w/ PMH HTN, CHF, PxAfib who presented for evaluation of fall 10/29/19. Found to have marked bowel distension and NITA. Nephrology is consulted for NITA management.    NITA  - baseline Cr in 2018 is 1.1  - admitted w/ elevated Cr  - NITA 2/2 urinary obstruction  - Pt non-oliguric with good urine output   - Cr improving s/p macedo placement  - Scr stable    - Avoid nephrotoxins including NSAIDs, IV contrast (if possible), [employer] products  - maintain MAP >65    - monitor I/O's closely  - monitor for post-obstructive diuresis    Hematuria  - likely traumatic from large volume diuresis from relief of obstruction  - macedo catheter placed 10/30/19 w/ over 1000 mL urine output  - urology following for hematuria  - trend H/H    Hypokalemia  - likely 2/2 poor PO intake  - Improved today  - monitor    Proteinuria/hematuria  - likely due to UTI as this is new for her since admission  - repeat UA after Abx completed    Acidosis  Improved today  likely GI losses from watery stool   s/p Bicitra 10cc PO BID 11/2. Now off

## 2019-11-03 NOTE — PROGRESS NOTE ADULT - ASSESSMENT
89 yo female with pmhx of HTN, HFpEF, chr afib who has been experiencing worsening weakness x 2 wks, s/p mech fall yesterday, CT and xrays neg. unable to ambulate.    # Fall/weakness  PT eval  prn tylenol for pain  S&S recs pureed texture diet    # leukocytosis  acute on chronic leukocytosis likely 2/2 urinary retention, now downtrending  appreciate ID recs, will observe off ABX, no signs of infection  urine and blood cx neg  repeat CT shows no change in colonic distention - will not pursue further investigation as family does not want aggressive intervention    # NITA  2/2 urinary retention and mod right hydro  >900cc urine in bladder on US, sp macedo placement for bladder decompression  hematuria likely 2/2 rapid bladder decompression, appreciate urology recs, no indication for CBI  will TOV monday   hold thiazide and furosemide  appreciate renal recs  standing k supplement    #elev BS, new DM  elev hba1c 7.8  will be conservative given advance age  monitor bld sugars    #CHF  last echo aug 2018- normal study- normal LV systolic function  hold home diuretics in the setting of hyponatremia and NITA  cont digoxin  mild trops d/t elev cr, ekg reviewed-sinus bakari 57    # HTN  cont metoprolol, hold lasix and dyazide    #GOC - dw dgtr and no aggressive measures  DNR/DNI    [facility]  [phone]    dispo: TOV monday

## 2019-11-03 NOTE — PROGRESS NOTE ADULT - SUBJECTIVE AND OBJECTIVE BOX
Patient is a 89y old  Female who presents with a chief complaint of sp fall unable to ambulate (03 Nov 2019 08:43)      SUBJECTIVE / OVERNIGHT EVENTS:    Patient seen and examined. co fatigue. no abd pain nvd.       Vital Signs Last 24 Hrs  T(C): 36.3 (03 Nov 2019 05:15), Max: 36.8 (02 Nov 2019 20:41)  T(F): 97.4 (03 Nov 2019 05:15), Max: 98.2 (02 Nov 2019 20:41)  HR: 56 (03 Nov 2019 05:15) (56 - 66)  BP: 120/68 (03 Nov 2019 05:15) (106/61 - 120/68)  BP(mean): --  RR: 18 (03 Nov 2019 05:15) (18 - 20)  SpO2: 95% (03 Nov 2019 05:15) (95% - 97%)  I&O's Summary    02 Nov 2019 08:01  -  03 Nov 2019 07:00  --------------------------------------------------------  IN: 680 mL / OUT: 950 mL / NET: -270 mL        PE:  GENERAL: NAD, AAOx2, frail  HEAD:  Atraumatic, Normocephalic  EYES: EOMI, PERRLA, conjunctiva and sclera clear  NECK: Supple, No JVD  CHEST/LUNG: CTABL, No wheeze  HEART: Regular rate and rhythm; no murmur  ABDOMEN: Soft, Nontender, distended; Bowel sounds present  gu: macedo   EXTREMITIES:  2+ Peripheral Pulses, +1 le edema  SKIN: No rashes or lesions  NEURO: No focal deficits    LABS:                        13.8   18.36 )-----------( 301      ( 03 Nov 2019 08:37 )             41.4     11-03    138  |  107  |  55<H>  ----------------------------<  200<H>  4.1   |  21<L>  |  1.23    Ca    9.2      03 Nov 2019 08:37  Mg     2.9     11-02        CAPILLARY BLOOD GLUCOSE      POCT Blood Glucose.: 183 mg/dL (03 Nov 2019 08:25)  POCT Blood Glucose.: 213 mg/dL (02 Nov 2019 21:40)  POCT Blood Glucose.: 175 mg/dL (02 Nov 2019 17:53)  POCT Blood Glucose.: 255 mg/dL (02 Nov 2019 11:57)  POCT Blood Glucose.: 272 mg/dL (02 Nov 2019 11:56)            RADIOLOGY & ADDITIONAL TESTS:    Imaging Personally Reviewed:  [x] YES  [ ] NO    Consultant(s) Notes Reviewed:  [x] YES  [ ] NO    MEDICATIONS  (STANDING):  ascorbic acid 500 milliGRAM(s) Oral daily  chlorhexidine 4% Liquid 1 Application(s) Topical daily  citric acid/sodium citrate Solution 10 milliLiter(s) Oral two times a day  dextrose 5%. 1000 milliLiter(s) (50 mL/Hr) IV Continuous <Continuous>  dextrose 50% Injectable 12.5 Gram(s) IV Push once  dextrose 50% Injectable 25 Gram(s) IV Push once  dextrose 50% Injectable 25 Gram(s) IV Push once  digoxin     Tablet 0.125 milliGRAM(s) Oral daily  heparin  Injectable 5000 Unit(s) SubCutaneous every 12 hours  insulin lispro (HumaLOG) corrective regimen sliding scale   SubCutaneous three times a day before meals  insulin lispro (HumaLOG) corrective regimen sliding scale   SubCutaneous at bedtime  metoprolol succinate  milliGRAM(s) Oral daily  multivitamin 1 Tablet(s) Oral daily  potassium chloride   Solution 40 milliEquivalent(s) Oral two times a day  senna 2 Tablet(s) Oral at bedtime  simethicone 80 milliGRAM(s) Chew every 6 hours  sodium chloride 0.9%. 1000 milliLiter(s) (100 mL/Hr) IV Continuous <Continuous>    MEDICATIONS  (PRN):  dextrose 40% Gel 15 Gram(s) Oral once PRN Blood Glucose LESS THAN 70 milliGRAM(s)/deciliter  glucagon  Injectable 1 milliGRAM(s) IntraMuscular once PRN Glucose LESS THAN 70 milligrams/deciliter  polyethylene glycol 3350 17 Gram(s) Oral daily PRN Constipation      Care Discussed with Consultants/Other Providers [x] YES  [ ] NO    HEALTH ISSUES - PROBLEM Dx:  Weakness: Weakness  NITA (acute kidney injury): NITA (acute kidney injury)  Abdominal distension: Abdominal distension  Leukocytosis: Leukocytosis

## 2019-11-04 LAB
ANION GAP SERPL CALC-SCNC: 11 MMOL/L — SIGNIFICANT CHANGE UP (ref 5–17)
ANION GAP SERPL CALC-SCNC: 11 MMOL/L — SIGNIFICANT CHANGE UP (ref 5–17)
BASE EXCESS BLDA CALC-SCNC: -4.4 MMOL/L — LOW (ref -2–2)
BUN SERPL-MCNC: 52 MG/DL — HIGH (ref 7–23)
BUN SERPL-MCNC: 53 MG/DL — HIGH (ref 7–23)
CALCIUM SERPL-MCNC: 8.6 MG/DL — SIGNIFICANT CHANGE UP (ref 8.4–10.5)
CALCIUM SERPL-MCNC: 9.3 MG/DL — SIGNIFICANT CHANGE UP (ref 8.4–10.5)
CHLORIDE SERPL-SCNC: 108 MMOL/L — SIGNIFICANT CHANGE UP (ref 96–108)
CHLORIDE SERPL-SCNC: 110 MMOL/L — HIGH (ref 96–108)
CO2 BLDA-SCNC: 23 MMOL/L — SIGNIFICANT CHANGE UP (ref 22–30)
CO2 SERPL-SCNC: 20 MMOL/L — LOW (ref 22–31)
CO2 SERPL-SCNC: 22 MMOL/L — SIGNIFICANT CHANGE UP (ref 22–31)
CREAT SERPL-MCNC: 1.24 MG/DL — SIGNIFICANT CHANGE UP (ref 0.5–1.3)
CREAT SERPL-MCNC: 1.25 MG/DL — SIGNIFICANT CHANGE UP (ref 0.5–1.3)
GAS PNL BLDA: SIGNIFICANT CHANGE UP
GLUCOSE BLDC GLUCOMTR-MCNC: 159 MG/DL — HIGH (ref 70–99)
GLUCOSE BLDC GLUCOMTR-MCNC: 160 MG/DL — HIGH (ref 70–99)
GLUCOSE BLDC GLUCOMTR-MCNC: 204 MG/DL — HIGH (ref 70–99)
GLUCOSE BLDC GLUCOMTR-MCNC: 210 MG/DL — HIGH (ref 70–99)
GLUCOSE SERPL-MCNC: 175 MG/DL — HIGH (ref 70–99)
GLUCOSE SERPL-MCNC: 220 MG/DL — HIGH (ref 70–99)
HCO3 BLDA-SCNC: 22 MMOL/L — SIGNIFICANT CHANGE UP (ref 21–29)
HCT VFR BLD CALC: 39.5 % — SIGNIFICANT CHANGE UP (ref 34.5–45)
HCT VFR BLD CALC: 40.9 % — SIGNIFICANT CHANGE UP (ref 34.5–45)
HGB BLD-MCNC: 12.4 G/DL — SIGNIFICANT CHANGE UP (ref 11.5–15.5)
HGB BLD-MCNC: 13 G/DL — SIGNIFICANT CHANGE UP (ref 11.5–15.5)
HOROWITZ INDEX BLDA+IHG-RTO: 28 — SIGNIFICANT CHANGE UP
MCHC RBC-ENTMCNC: 29.3 PG — SIGNIFICANT CHANGE UP (ref 27–34)
MCHC RBC-ENTMCNC: 29.5 PG — SIGNIFICANT CHANGE UP (ref 27–34)
MCHC RBC-ENTMCNC: 31.4 GM/DL — LOW (ref 32–36)
MCHC RBC-ENTMCNC: 31.8 GM/DL — LOW (ref 32–36)
MCV RBC AUTO: 92.7 FL — SIGNIFICANT CHANGE UP (ref 80–100)
MCV RBC AUTO: 93.4 FL — SIGNIFICANT CHANGE UP (ref 80–100)
NRBC # BLD: 0 /100 WBCS — SIGNIFICANT CHANGE UP (ref 0–0)
NRBC # BLD: 0 /100 WBCS — SIGNIFICANT CHANGE UP (ref 0–0)
PCO2 BLDA: 46 MMHG — SIGNIFICANT CHANGE UP (ref 32–46)
PH BLDA: 7.3 — LOW (ref 7.35–7.45)
PLATELET # BLD AUTO: 304 K/UL — SIGNIFICANT CHANGE UP (ref 150–400)
PLATELET # BLD AUTO: 304 K/UL — SIGNIFICANT CHANGE UP (ref 150–400)
PO2 BLDA: 97 MMHG — SIGNIFICANT CHANGE UP (ref 74–108)
POTASSIUM SERPL-MCNC: 3.7 MMOL/L — SIGNIFICANT CHANGE UP (ref 3.5–5.3)
POTASSIUM SERPL-MCNC: 4.3 MMOL/L — SIGNIFICANT CHANGE UP (ref 3.5–5.3)
POTASSIUM SERPL-SCNC: 3.7 MMOL/L — SIGNIFICANT CHANGE UP (ref 3.5–5.3)
POTASSIUM SERPL-SCNC: 4.3 MMOL/L — SIGNIFICANT CHANGE UP (ref 3.5–5.3)
RBC # BLD: 4.23 M/UL — SIGNIFICANT CHANGE UP (ref 3.8–5.2)
RBC # BLD: 4.41 M/UL — SIGNIFICANT CHANGE UP (ref 3.8–5.2)
RBC # FLD: 14 % — SIGNIFICANT CHANGE UP (ref 10.3–14.5)
RBC # FLD: 14.2 % — SIGNIFICANT CHANGE UP (ref 10.3–14.5)
SAO2 % BLDA: 98 % — HIGH (ref 92–96)
SODIUM SERPL-SCNC: 141 MMOL/L — SIGNIFICANT CHANGE UP (ref 135–145)
SODIUM SERPL-SCNC: 141 MMOL/L — SIGNIFICANT CHANGE UP (ref 135–145)
WBC # BLD: 16.95 K/UL — HIGH (ref 3.8–10.5)
WBC # BLD: 17.73 K/UL — HIGH (ref 3.8–10.5)
WBC # FLD AUTO: 16.95 K/UL — HIGH (ref 3.8–10.5)
WBC # FLD AUTO: 17.73 K/UL — HIGH (ref 3.8–10.5)

## 2019-11-04 PROCEDURE — 71045 X-RAY EXAM CHEST 1 VIEW: CPT | Mod: 26

## 2019-11-04 RX ORDER — FUROSEMIDE 40 MG
40 TABLET ORAL ONCE
Refills: 0 | Status: COMPLETED | OUTPATIENT
Start: 2019-11-04 | End: 2019-11-04

## 2019-11-04 RX ORDER — TAMSULOSIN HYDROCHLORIDE 0.4 MG/1
0.4 CAPSULE ORAL AT BEDTIME
Refills: 0 | Status: DISCONTINUED | OUTPATIENT
Start: 2019-11-04 | End: 2019-11-06

## 2019-11-04 RX ORDER — IPRATROPIUM/ALBUTEROL SULFATE 18-103MCG
3 AEROSOL WITH ADAPTER (GRAM) INHALATION ONCE
Refills: 0 | Status: COMPLETED | OUTPATIENT
Start: 2019-11-04 | End: 2019-11-04

## 2019-11-04 RX ADMIN — Medication 0.12 MILLIGRAM(S): at 06:27

## 2019-11-04 RX ADMIN — Medication 40 MILLIEQUIVALENT(S): at 17:59

## 2019-11-04 RX ADMIN — SIMETHICONE 80 MILLIGRAM(S): 80 TABLET, CHEWABLE ORAL at 11:57

## 2019-11-04 RX ADMIN — SIMETHICONE 80 MILLIGRAM(S): 80 TABLET, CHEWABLE ORAL at 06:27

## 2019-11-04 RX ADMIN — SENNA PLUS 2 TABLET(S): 8.6 TABLET ORAL at 21:41

## 2019-11-04 RX ADMIN — HEPARIN SODIUM 5000 UNIT(S): 5000 INJECTION INTRAVENOUS; SUBCUTANEOUS at 17:59

## 2019-11-04 RX ADMIN — SIMETHICONE 80 MILLIGRAM(S): 80 TABLET, CHEWABLE ORAL at 17:59

## 2019-11-04 RX ADMIN — Medication 40 MILLIGRAM(S): at 21:29

## 2019-11-04 RX ADMIN — HEPARIN SODIUM 5000 UNIT(S): 5000 INJECTION INTRAVENOUS; SUBCUTANEOUS at 06:27

## 2019-11-04 RX ADMIN — Medication 1: at 18:05

## 2019-11-04 RX ADMIN — TAMSULOSIN HYDROCHLORIDE 0.4 MILLIGRAM(S): 0.4 CAPSULE ORAL at 21:41

## 2019-11-04 RX ADMIN — Medication 1: at 08:32

## 2019-11-04 RX ADMIN — Medication 1 TABLET(S): at 11:57

## 2019-11-04 RX ADMIN — Medication 40 MILLIEQUIVALENT(S): at 06:27

## 2019-11-04 RX ADMIN — Medication 2: at 13:24

## 2019-11-04 RX ADMIN — Medication 3 MILLILITER(S): at 21:03

## 2019-11-04 RX ADMIN — Medication 500 MILLIGRAM(S): at 11:57

## 2019-11-04 NOTE — PROVIDER CONTACT NOTE (OTHER) - BACKGROUND
Admitted for weakness over 2 weeks and s/p fall, hx heart failure, paroxysmal atrial fibrillation, abdominal distension

## 2019-11-04 NOTE — PROGRESS NOTE ADULT - ASSESSMENT
89 yo female with pmhx of HTN, HFpEF, chr afib who has been experiencing worsening weakness x 2 wks, s/p mech fall yesterday, CT and xrays neg. unable to ambulate.    # Fall/weakness FTT  PT eval  prn tylenol for pain  S&S recs pureed texture diet    # leukocytosis  acute on chronic leukocytosis likely 2/2 urinary retention, now at baseline  appreciate ID recs, will observe off ABX, no signs of infection  urine and blood cx neg  repeat CT shows no change in colonic distention - will not pursue further investigation as family does not want aggressive intervention    # NITA  2/2 urinary retention and mod right hydro  >900cc urine in bladder on US, sp macedo placement for bladder decompression  hematuria likely 2/2 rapid bladder decompression, appreciate urology recs, no indication for CBI  TOV today, if fails, recall urology and replace macedo  hold thiazide and furosemide  appreciate renal recs  standing k supplement    #elev BS, new DM  elev hba1c 7.8  will be conservative given advance age  monitor bld sugars    #CHF  last echo aug 2018- normal study- normal LV systolic function  hold home diuretics in the setting of hyponatremia and NITA  cont digoxin  mild trops d/t elev cr, ekg reviewed-sinus bakari 57    # HTN  cont metoprolol, hold lasix and dyazide    #GOC - dw dgtr and no aggressive measures  DNR/DNI    [facility]      dispo: TOV and DC planning

## 2019-11-04 NOTE — PROGRESS NOTE ADULT - SUBJECTIVE AND OBJECTIVE BOX
[facility] NEPHROLOGY PRACTICE   MD Ivett Werner D.O. Fatima Sheikh, D.O. Ruoru Wong, PA    From 7 AM - 5 PM:  OFFICE: [phone]  Dr. Reyes cell: [phone]  Dr. Crouch cell: [phone]  Dr. Dumont cell: [phone]  CIERRA Vargas cell: [phone]    From 5 PM - 7 AM: Answering Service: [phone]        RENAL FOLLOW UP NOTE  --------------------------------------------------------------------------------  HPI: Pt seen and examined. Has no complaints today.         PAST HISTORY  --------------------------------------------------------------------------------  No significant changes to PMH, PSH, FHx, SHx, unless otherwise noted    ALLERGIES & MEDICATIONS  --------------------------------------------------------------------------------  Allergies    No Known Allergies    Intolerances      Standing Inpatient Medications  ascorbic acid 500 milliGRAM(s) Oral daily  chlorhexidine 4% Liquid 1 Application(s) Topical daily  dextrose 5%. 1000 milliLiter(s) IV Continuous <Continuous>  dextrose 50% Injectable 12.5 Gram(s) IV Push once  dextrose 50% Injectable 25 Gram(s) IV Push once  dextrose 50% Injectable 25 Gram(s) IV Push once  digoxin     Tablet 0.125 milliGRAM(s) Oral daily  heparin  Injectable 5000 Unit(s) SubCutaneous every 12 hours  insulin lispro (HumaLOG) corrective regimen sliding scale   SubCutaneous three times a day before meals  insulin lispro (HumaLOG) corrective regimen sliding scale   SubCutaneous at bedtime  metoprolol succinate  milliGRAM(s) Oral daily  multivitamin 1 Tablet(s) Oral daily  potassium chloride   Solution 40 milliEquivalent(s) Oral two times a day  senna 2 Tablet(s) Oral at bedtime  simethicone 80 milliGRAM(s) Chew every 6 hours  sodium chloride 0.9%. 1000 milliLiter(s) IV Continuous <Continuous>    PRN Inpatient Medications  dextrose 40% Gel 15 Gram(s) Oral once PRN  glucagon  Injectable 1 milliGRAM(s) IntraMuscular once PRN  polyethylene glycol 3350 17 Gram(s) Oral daily PRN      REVIEW OF SYSTEMS  --------------------------------------------------------------------------------  General: no fever  CVS: no chest pain  RESP: no sob, no cough  ABD: no abdominal pain  : no dysuria,  MSK: no edema     VITALS/PHYSICAL EXAM  --------------------------------------------------------------------------------  T(C): 36.4 (11-04-19 @ 13:06), Max: 36.9 (11-04-19 @ 08:33)  HR: 56 (11-04-19 @ 13:06) (50 - 62)  BP: 125/68 (11-04-19 @ 13:06) (110/59 - 128/63)  RR: 16 (11-04-19 @ 13:06) (16 - 18)  SpO2: 97% (11-04-19 @ 13:06) (97% - 100%)  Wt(kg): --        11-03-19 @ 07:01  -  11-04-19 @ 07:00  --------------------------------------------------------  IN: 720 mL / OUT: 550 mL / NET: 170 mL    11-04-19 @ 07:01  -  11-04-19 @ 16:33  --------------------------------------------------------  IN: 480 mL / OUT: 60 mL / NET: 420 mL      Physical Exam:  	Gen: NAD, tachypnic  	HEENT: MMM  	Pulm: B/L rhonchi  	CV: S1S2  	Abd: Soft, +BS  	Ext: No LE edema B/L              Neuro: Awake   	Skin: Warm and Dry       LABS/STUDIES  --------------------------------------------------------------------------------              13.0   17.73 >-----------<  304      [11-04-19 @ 08:53]              40.9     141  |  108  |  52  ----------------------------<  175      [11-04-19 @ 08:53]  4.3   |  22  |  1.24        Ca     9.3     [11-04-19 @ 08:53]            Creatinine Trend:  SCr 1.24 [11-04 @ 08:53]  SCr 1.23 [11-03 @ 08:37]  SCr 1.06 [11-02 @ 07:01]  SCr 1.27 [11-01 @ 06:31]  SCr 1.40 [10-31 @ 12:44]    Urinalysis - [10-28-19 @ 08:24]      Color Yellow / Appearance Slightly Turbid / SG 1.017 / pH 8.5      Gluc Negative / Ketone Negative  / Bili Negative / Urobili Negative       Blood Moderate / Protein 30 mg/dL / Leuk Est Large / Nitrite Positive      RBC >50 / WBC 7 / Hyaline 0 / Gran  / Sq Epi  / Non Sq Epi 8 / Bacteria Moderate      HbA1c 7.8      [10-29-19 @ 13:12]

## 2019-11-04 NOTE — CHART NOTE - NSCHARTNOTEFT_GEN_A_CORE
Nutrition Follow Up Note  Patient seen for: Malnutrition follow up. Chart reviewed and events noted. Per chart, s/p SLP swallow evaluation  recommended for NPO, but family wishes to continue po diet and no enteral feed, no aggressive interventions.     Source: EMR; pt awake but with minimal response to questions    Diet : Dysphagia 1 honey with Ensure Pudding 3 x day and Glucerna 2 x day    Patient reports: no GI distress noted, +BM today per chart     PO intake : pt with poor po intakes persists, lunch tray left untouched at bedside     Source for PO intake: EMR reviewed      Daily Weight in k.9 (10-30)  % Weight Change    Pertinent Medications: MEDICATIONS  (STANDING):  ascorbic acid 500 milliGRAM(s) Oral daily  chlorhexidine 4% Liquid 1 Application(s) Topical daily  dextrose 5%. 1000 milliLiter(s) (50 mL/Hr) IV Continuous <Continuous>  dextrose 50% Injectable 12.5 Gram(s) IV Push once  dextrose 50% Injectable 25 Gram(s) IV Push once  dextrose 50% Injectable 25 Gram(s) IV Push once  digoxin     Tablet 0.125 milliGRAM(s) Oral daily  heparin  Injectable 5000 Unit(s) SubCutaneous every 12 hours  insulin lispro (HumaLOG) corrective regimen sliding scale   SubCutaneous three times a day before meals  insulin lispro (HumaLOG) corrective regimen sliding scale   SubCutaneous at bedtime  metoprolol succinate  milliGRAM(s) Oral daily  multivitamin 1 Tablet(s) Oral daily  potassium chloride   Solution 40 milliEquivalent(s) Oral two times a day  senna 2 Tablet(s) Oral at bedtime  simethicone 80 milliGRAM(s) Chew every 6 hours  sodium chloride 0.9%. 1000 milliLiter(s) (100 mL/Hr) IV Continuous <Continuous>    MEDICATIONS  (PRN):  dextrose 40% Gel 15 Gram(s) Oral once PRN Blood Glucose LESS THAN 70 milliGRAM(s)/deciliter  glucagon  Injectable 1 milliGRAM(s) IntraMuscular once PRN Glucose LESS THAN 70 milligrams/deciliter  polyethylene glycol 3350 17 Gram(s) Oral daily PRN Constipation     @ 08:53: Na 141, BUN 52<H>, Cr 1.24, <H>, K+ 4.3, Phos --, Mg --, Alk Phos --, ALT/SGPT --, AST/SGOT --, HbA1c --    Finger Sticks:  POCT Blood Glucose.: 210 mg/dL ( @ 13:21)  POCT Blood Glucose.: 160 mg/dL ( @ 08:17)  POCT Blood Glucose.: 184 mg/dL ( @ 21:46)  POCT Blood Glucose.: 177 mg/dL ( @ 18:07)      Skin per nursing documentation: stage 2 pressure injury on sacrum and left buttocks   Edema: +1 bilateral legs noted    Estimated Needs:   [ x] no change since previous assessment  [ ] recalculated:     Previous Nutrition Diagnosis: moderate malnutrition  Nutrition Diagnosis is: ongoing    New Nutrition Diagnosis: n/a     Interventions:     Recommend  1) Continue to monitor weight, lab values, and diet tolerance. Pt on comfort feeds per family's request. Care plan achieved.  2) RD remains available.      Monitoring and Evaluation:     Continue to monitor Nutritional intake, Tolerance to diet prescription, weights, labs, skin integrity    RD remains available upon request and will follow up per protocol

## 2019-11-04 NOTE — PROGRESS NOTE ADULT - ASSESSMENT
89 yo F w/ PMH HTN, CHF, PxAfib who presented for evaluation of fall 10/29/19. Found to have marked bowel distension and NITA. Nephrology is consulted for NITA management.    NITA  - baseline Cr in 2018 is 1.1  - admitted w/ elevated Cr  - NITA 2/2 urinary obstruction which resolved after macedo catheter placement    - macedo was removed this AM (11/4/19). Has not voided per RN. Bladder scan is high this afternoon    - place macedo catheter again for urinary retention and start flomax 0.4 mg daily    - Avoid nephrotoxins including NSAIDs, IV contrast (if possible), [employer] products  - maintain MAP >65    - monitor I/O's closely  - monitor for post-obstructive diuresis    Hematuria  - likely traumatic from large volume diuresis from relief of obstruction  - macedo catheter placed 10/30/19 w/ over 1000 mL urine output  - urology following for hematuria  - trend H/H    Hypokalemia  - likely 2/2 poor PO intake  - Improved today  - monitor    Proteinuria/hematuria  - likely due to UTI as this is new for her since admission  - repeat UA after Abx completed    Acidosis  Improved again today  likely GI losses from watery stool   s/p Bicitra 10cc PO BID 11/2. Now off

## 2019-11-04 NOTE — CHART NOTE - NSCHARTNOTEFT_GEN_A_CORE
Patient is a 89y old  Female who presents with a chief complaint of sp fall unable to ambulate (04 Nov 2019 16:33)      Vital Signs Last 24 Hrs  T(C): 36.5 (04 Nov 2019 21:28), Max: 36.9 (04 Nov 2019 08:33)  T(F): 97.7 (04 Nov 2019 21:28), Max: 98.5 (04 Nov 2019 08:33)  HR: 68 (04 Nov 2019 21:28) (50 - 68)  BP: 127/60 (04 Nov 2019 21:28) (114/66 - 129/60)  BP(mean): --  RR: 18 (04 Nov 2019 21:28) (16 - 26)  SpO2: 96% (04 Nov 2019 21:28) (96% - 100%)      Labs:                          13.0   17.73 )-----------( 304      ( 04 Nov 2019 08:53 )             40.9     11-04    141  |  108  |  52<H>  ----------------------------<  175<H>  4.3   |  22  |  1.24    Ca    9.3      04 Nov 2019 08:53          Radiology:  < from: Xray Chest 1 View- PORTABLE-Urgent (11.02.19 @ 17:26) >    IMPRESSION:    Clear lungs. No pleural effusion or pneumothorax.    The cardiomediastinal silhouette is normal in size and contour.    Dilated loops of bowel and colon within the upper abdomen again noted.    < end of copied text >        Physical Exam:  General: WN, mild distress, tachypneic  Neurology: A&Ox3, nonfocal, SOTO x 4  Head:  Normocephalic, atraumatic  Respiratory: rhonchi B/L to auscultation, tachypneic; O2 sat 97%  CV: RRR, S1S2, no murmur  Abdominal: round, distended, firm, nontender, active BS in all 4 quadrants, watery bowel movement  MSK: No edema, + peripheral pulses, FROM all 4 extremity    Assessment & Plan:  HPI:  89 yo female with pmhx of HTN, CHF who has been experiencing worsening weakness x 2 wks, seen in ED yesterday because she slid out of her chair onto the floor yesterday.  Pt had CT head, C-spine, XR's.  Pt had mild hypokalemia and was repleted and discharged.  Family now represents to Ed because they state that they can no longer care for pt at home because she is too weak for them to help her to the bathroom. Pt's CTAP yesterday obtained because of abdominal distention recommended possible rectal tube and surg consult.  Pt and family declined this intervention.   No other new complaints.  (28 Oct 2019 12:37)    Problem #1- Tachypnea, SOB  - RN to PA- Pt is tachypneic and wheezing  - Pt observed at bedside- tachypneic with B/L rhonchi to auscultation  - stat duoneb for SOB  - stat CBC, BMP, ABG  - urgent CXR- waiting for prelim report  - 40mg Lasix IV for possible fluid overload  - F/u with morning team in AM  - f/u with attending if pt declines    CIERRA Marinelli  [id number]

## 2019-11-05 LAB
CULTURE RESULTS: SIGNIFICANT CHANGE UP
GLUCOSE BLDC GLUCOMTR-MCNC: 201 MG/DL — HIGH (ref 70–99)
GLUCOSE BLDC GLUCOMTR-MCNC: 224 MG/DL — HIGH (ref 70–99)
GLUCOSE BLDC GLUCOMTR-MCNC: 254 MG/DL — HIGH (ref 70–99)
GLUCOSE BLDC GLUCOMTR-MCNC: 285 MG/DL — HIGH (ref 70–99)
SPECIMEN SOURCE: SIGNIFICANT CHANGE UP

## 2019-11-05 RX ORDER — FUROSEMIDE 40 MG
20 TABLET ORAL DAILY
Refills: 0 | Status: DISCONTINUED | OUTPATIENT
Start: 2019-11-05 | End: 2019-11-06

## 2019-11-05 RX ORDER — ASCORBIC ACID 60 MG
1 TABLET,CHEWABLE ORAL
Qty: 0 | Refills: 0 | DISCHARGE
Start: 2019-11-05

## 2019-11-05 RX ORDER — DIGOXIN 250 MCG
1 TABLET ORAL
Qty: 0 | Refills: 0 | DISCHARGE

## 2019-11-05 RX ORDER — SIMETHICONE 80 MG/1
1 TABLET, CHEWABLE ORAL
Qty: 0 | Refills: 0 | DISCHARGE
Start: 2019-11-05

## 2019-11-05 RX ORDER — POTASSIUM CHLORIDE 20 MEQ
30 PACKET (EA) ORAL
Qty: 0 | Refills: 0 | DISCHARGE
Start: 2019-11-05

## 2019-11-05 RX ORDER — INSULIN LISPRO 100/ML
0 VIAL (ML) SUBCUTANEOUS
Qty: 0 | Refills: 0 | DISCHARGE

## 2019-11-05 RX ORDER — IPRATROPIUM/ALBUTEROL SULFATE 18-103MCG
3 AEROSOL WITH ADAPTER (GRAM) INHALATION ONCE
Refills: 0 | Status: COMPLETED | OUTPATIENT
Start: 2019-11-05 | End: 2019-11-05

## 2019-11-05 RX ORDER — SENNA PLUS 8.6 MG/1
2 TABLET ORAL
Qty: 0 | Refills: 0 | DISCHARGE
Start: 2019-11-05

## 2019-11-05 RX ORDER — LANOLIN ALCOHOL/MO/W.PET/CERES
3 CREAM (GRAM) TOPICAL ONCE
Refills: 0 | Status: COMPLETED | OUTPATIENT
Start: 2019-11-05 | End: 2019-11-05

## 2019-11-05 RX ORDER — FUROSEMIDE 40 MG
1 TABLET ORAL
Qty: 0 | Refills: 0 | DISCHARGE

## 2019-11-05 RX ORDER — FUROSEMIDE 40 MG
1 TABLET ORAL
Qty: 0 | Refills: 0 | DISCHARGE
Start: 2019-11-05

## 2019-11-05 RX ORDER — TAMSULOSIN HYDROCHLORIDE 0.4 MG/1
1 CAPSULE ORAL
Qty: 0 | Refills: 0 | DISCHARGE
Start: 2019-11-05

## 2019-11-05 RX ORDER — METOPROLOL TARTRATE 50 MG
1 TABLET ORAL
Qty: 0 | Refills: 0 | DISCHARGE

## 2019-11-05 RX ORDER — POLYETHYLENE GLYCOL 3350 17 G/17G
17 POWDER, FOR SOLUTION ORAL
Qty: 0 | Refills: 0 | DISCHARGE
Start: 2019-11-05

## 2019-11-05 RX ORDER — IPRATROPIUM/ALBUTEROL SULFATE 18-103MCG
3 AEROSOL WITH ADAPTER (GRAM) INHALATION THREE TIMES A DAY
Refills: 0 | Status: DISCONTINUED | OUTPATIENT
Start: 2019-11-05 | End: 2019-11-06

## 2019-11-05 RX ADMIN — Medication 2: at 13:25

## 2019-11-05 RX ADMIN — Medication 40 MILLIEQUIVALENT(S): at 06:17

## 2019-11-05 RX ADMIN — Medication 3: at 09:59

## 2019-11-05 RX ADMIN — Medication 20 MILLIGRAM(S): at 10:01

## 2019-11-05 RX ADMIN — SIMETHICONE 80 MILLIGRAM(S): 80 TABLET, CHEWABLE ORAL at 01:24

## 2019-11-05 RX ADMIN — SIMETHICONE 80 MILLIGRAM(S): 80 TABLET, CHEWABLE ORAL at 18:06

## 2019-11-05 RX ADMIN — Medication 3 MILLILITER(S): at 14:05

## 2019-11-05 RX ADMIN — HEPARIN SODIUM 5000 UNIT(S): 5000 INJECTION INTRAVENOUS; SUBCUTANEOUS at 06:17

## 2019-11-05 RX ADMIN — Medication 500 MILLIGRAM(S): at 13:27

## 2019-11-05 RX ADMIN — Medication 40 MILLIEQUIVALENT(S): at 18:06

## 2019-11-05 RX ADMIN — Medication 0.12 MILLIGRAM(S): at 06:16

## 2019-11-05 RX ADMIN — Medication 100 MILLIGRAM(S): at 06:16

## 2019-11-05 RX ADMIN — SIMETHICONE 80 MILLIGRAM(S): 80 TABLET, CHEWABLE ORAL at 13:26

## 2019-11-05 RX ADMIN — TAMSULOSIN HYDROCHLORIDE 0.4 MILLIGRAM(S): 0.4 CAPSULE ORAL at 22:09

## 2019-11-05 RX ADMIN — Medication 3: at 18:06

## 2019-11-05 RX ADMIN — Medication 3 MILLILITER(S): at 22:09

## 2019-11-05 RX ADMIN — Medication 1 TABLET(S): at 13:26

## 2019-11-05 RX ADMIN — HEPARIN SODIUM 5000 UNIT(S): 5000 INJECTION INTRAVENOUS; SUBCUTANEOUS at 18:06

## 2019-11-05 RX ADMIN — SIMETHICONE 80 MILLIGRAM(S): 80 TABLET, CHEWABLE ORAL at 06:16

## 2019-11-05 RX ADMIN — CHLORHEXIDINE GLUCONATE 1 APPLICATION(S): 213 SOLUTION TOPICAL at 13:21

## 2019-11-05 NOTE — PROGRESS NOTE ADULT - SUBJECTIVE AND OBJECTIVE BOX
Patient is a 89y old  Female who presents with a chief complaint of sp fall unable to ambulate (05 Nov 2019 08:37)      SUBJECTIVE / OVERNIGHT EVENTS:    Patient seen and examined. poor historian cannot provide ROS. denies dyspnea but overnight event co dyspnea and tachypnea sp lasix 40mg IVP.        Vital Signs Last 24 Hrs  T(C): 36.4 (05 Nov 2019 05:23), Max: 36.6 (04 Nov 2019 20:29)  T(F): 97.5 (05 Nov 2019 05:23), Max: 97.8 (04 Nov 2019 20:29)  HR: 75 (05 Nov 2019 05:23) (56 - 75)  BP: 116/62 (05 Nov 2019 05:23) (116/62 - 129/60)  BP(mean): --  RR: 18 (05 Nov 2019 05:23) (16 - 26)  SpO2: 95% (05 Nov 2019 05:23) (95% - 97%)  I&O's Summary    04 Nov 2019 07:01  -  05 Nov 2019 07:00  --------------------------------------------------------  IN: 1260 mL / OUT: 2180 mL / NET: -920 mL        PE:  GENERAL: NAD, AAOx1  HEAD:  Atraumatic, Normocephalic  EYES: EOMI, PERRLA, conjunctiva and sclera clear  NECK: Supple, No JVD  CHEST/LUNG: bl rales at base  HEART: irregular irregular; no murmur  ABDOMEN: Soft, Nontender, + distended; Bowel sounds present  EXTREMITIES:  2+ Peripheral Pulses, + le edema  SKIN: No rashes or lesions  NEURO: No focal deficits    LABS:                        12.4   16.95 )-----------( 304      ( 04 Nov 2019 22:15 )             39.5     11-04    141  |  110<H>  |  53<H>  ----------------------------<  220<H>  3.7   |  20<L>  |  1.25    Ca    8.6      04 Nov 2019 22:15        CAPILLARY BLOOD GLUCOSE      POCT Blood Glucose.: 204 mg/dL (04 Nov 2019 21:34)  POCT Blood Glucose.: 159 mg/dL (04 Nov 2019 18:00)  POCT Blood Glucose.: 210 mg/dL (04 Nov 2019 13:21)            RADIOLOGY & ADDITIONAL TESTS:    Imaging Personally Reviewed:  [x] YES  [ ] NO    Consultant(s) Notes Reviewed:  [x] YES  [ ] NO    MEDICATIONS  (STANDING):  ascorbic acid 500 milliGRAM(s) Oral daily  chlorhexidine 4% Liquid 1 Application(s) Topical daily  dextrose 5%. 1000 milliLiter(s) (50 mL/Hr) IV Continuous <Continuous>  dextrose 50% Injectable 12.5 Gram(s) IV Push once  dextrose 50% Injectable 25 Gram(s) IV Push once  dextrose 50% Injectable 25 Gram(s) IV Push once  digoxin     Tablet 0.125 milliGRAM(s) Oral daily  heparin  Injectable 5000 Unit(s) SubCutaneous every 12 hours  insulin lispro (HumaLOG) corrective regimen sliding scale   SubCutaneous three times a day before meals  insulin lispro (HumaLOG) corrective regimen sliding scale   SubCutaneous at bedtime  metoprolol succinate  milliGRAM(s) Oral daily  multivitamin 1 Tablet(s) Oral daily  potassium chloride   Solution 40 milliEquivalent(s) Oral two times a day  senna 2 Tablet(s) Oral at bedtime  simethicone 80 milliGRAM(s) Chew every 6 hours  sodium chloride 0.9%. 1000 milliLiter(s) (100 mL/Hr) IV Continuous <Continuous>  tamsulosin 0.4 milliGRAM(s) Oral at bedtime    MEDICATIONS  (PRN):  dextrose 40% Gel 15 Gram(s) Oral once PRN Blood Glucose LESS THAN 70 milliGRAM(s)/deciliter  glucagon  Injectable 1 milliGRAM(s) IntraMuscular once PRN Glucose LESS THAN 70 milligrams/deciliter  polyethylene glycol 3350 17 Gram(s) Oral daily PRN Constipation      Care Discussed with Consultants/Other Providers [x] YES  [ ] NO    HEALTH ISSUES - PROBLEM Dx:  Weakness: Weakness  NITA (acute kidney injury): NITA (acute kidney injury)  Abdominal distension: Abdominal distension  Leukocytosis: Leukocytosis

## 2019-11-05 NOTE — PROGRESS NOTE ADULT - ATTENDING COMMENTS
Dr. King will take over care tomorrow 11/6/19.  Please contact with any questions or concerns [phone].
Dr Winslow will be covering  starting 10/30/19  [facility]  [phone]

## 2019-11-05 NOTE — CONSULT NOTE ADULT - SUBJECTIVE AND OBJECTIVE BOX
PULMONARY CONSULT  Vincenzo Bright MD  [phone]    Initial HPI on admission:  HPI:  89 yo female with pmhx of HTN, CHF who has been experiencing worsening weakness x 2 wks, seen in ED yesterday because she slid out of her chair onto the floor yesterday.  Pt had CT head, C-spine, XR's.  Pt had mild hypokalemia and was repleted and discharged.  Family now represents to Ed because they state that they can no longer care for pt at home because she is too weak for them to help her to the bathroom. Pt's CTAP yesterday obtained because of abdominal distention recommended possible rectal tube and surg consult.  Pt and family declined this intervention.   No other new complaints.    History obtained RN and daughters at bedside. Called to evaluate episode of tachypnea/wheezing last night; RN noted some wheezing/rhonchi on exam this AM. Patient received lasix last night, which had been held in setting of NITA, with subsequent CXR without evidence of pulmonary edema and no change in marked colonic distenstion with air. Patient appears weak/frail yet without tachypnea in bed. She denies dyspnea or CP  Patient's abd distention increased significantly over last few weeks per family.  There is no history of COPD or asthma      ROS:    [place], pain rt leg and low back post fall  no cp/sob/dizziness/fever/chills/abd pain/n/v  no urinary or bowel habit changes- known urinary incontinence       PAST MEDICAL & SURGICAL HISTORY:  Heart failure  Paroxysmal atrial fibrillation  HTN (hypertension)  CHF (congestive heart failure)  No significant past surgical history  History of appendectomy    Allergies    No Known Allergies    Intolerances      FAMILY HISTORY:  No pertinent family history in first degree relatives  Family history of throat cancer (Sibling)    Social History (marital status, living situation, occupation, tobacco use, alcohol and drug use, and sexual history): non smoker  non alcoholic lives with dgtr family	     Tobacco Screening:  · Core Measure Site	No	        Medications:  MEDICATIONS  (STANDING):  ascorbic acid 500 milliGRAM(s) Oral daily  chlorhexidine 4% Liquid 1 Application(s) Topical daily  dextrose 5%. 1000 milliLiter(s) (50 mL/Hr) IV Continuous <Continuous>  dextrose 50% Injectable 12.5 Gram(s) IV Push once  dextrose 50% Injectable 25 Gram(s) IV Push once  dextrose 50% Injectable 25 Gram(s) IV Push once  digoxin     Tablet 0.125 milliGRAM(s) Oral daily  furosemide    Tablet 20 milliGRAM(s) Oral daily  heparin  Injectable 5000 Unit(s) SubCutaneous every 12 hours  insulin lispro (HumaLOG) corrective regimen sliding scale   SubCutaneous three times a day before meals  insulin lispro (HumaLOG) corrective regimen sliding scale   SubCutaneous at bedtime  metoprolol succinate  milliGRAM(s) Oral daily  multivitamin 1 Tablet(s) Oral daily  potassium chloride   Solution 40 milliEquivalent(s) Oral two times a day  senna 2 Tablet(s) Oral at bedtime  simethicone 80 milliGRAM(s) Chew every 6 hours  tamsulosin 0.4 milliGRAM(s) Oral at bedtime    MEDICATIONS  (PRN):  dextrose 40% Gel 15 Gram(s) Oral once PRN Blood Glucose LESS THAN 70 milliGRAM(s)/deciliter  glucagon  Injectable 1 milliGRAM(s) IntraMuscular once PRN Glucose LESS THAN 70 milligrams/deciliter  polyethylene glycol 3350 17 Gram(s) Oral daily PRN Constipation    Vital Signs Last 24 Hrs  T(C): 36.4 (05 Nov 2019 05:23), Max: 36.6 (04 Nov 2019 20:29)  T(F): 97.5 (05 Nov 2019 05:23), Max: 97.8 (04 Nov 2019 20:29)  HR: 75 (05 Nov 2019 09:58) (56 - 75)  BP: 124/71 (05 Nov 2019 09:58) (116/62 - 129/60)  BP(mean): --  RR: 18 (05 Nov 2019 05:23) (16 - 26)  SpO2: 95% (05 Nov 2019 05:23) (95% - 97%)    ABG - ( 04 Nov 2019 22:23 )  pH, Arterial: 7.30  pH, Blood: x     /  pCO2: 46    /  pO2: 97    / HCO3: 22    / Base Excess: -4.4  /  SaO2: 98        11-04 @ 07:01  -  11-05 @ 07:00  --------------------------------------------------------  IN: 1260 mL / OUT: 2180 mL / NET: -920 mL      LABS:                        12.4   16.95 )-----------( 304      ( 04 Nov 2019 22:15 )             39.5     11-04    141  |  110<H>  |  53<H>  ----------------------------<  220<H>  3.7   |  20<L>  |  1.25    Ca    8.6      04 Nov 2019 22:15    Physical Examination:    General: Non toxic, awake. No acute distress.      HEENT: Pupils equal, reactive to light.  Symmetric.    PULM: Clear to auscultation bilaterally, no significant sputum production    CVS: Regular rate and rhythm, no murmurs, rubs, or gallops    ABD: Markedly distended: tympanitic, non tender    EXT: No edema, nontender    SKIN: Warm and well perfused, no rashes noted.    NEURO: Alert, oriented, interactive, nonfocal    RADIOLOGY REVIEWED PERSONALLY  CXR:    PROCEDURE DATE:  11/02/2019        INTERPRETATION:  XR CHEST URGENT. AP view.    INDICATION: Worsening cough    COMPARISON: 10/28/2019    FINDINGS/  IMPRESSION:    Clear lungs. No pleural effusion or pneumothorax.    The cardiomediastinal silhouette is normal in size and contour.    Dilated loops of bowel and colon within the upper abdomen again noted.    CT Abd:    PROCEDURE DATE:  10/30/2019        INTERPRETATION:  CLINICAL INFORMATION: Leukocytosis. Urinary tract   infection.    COMPARISON: 3/26/2018 and 10/27/2019.    PROCEDURE:   CT of the Abdomen and Pelvis was performed without intravenous contrast.   Intravenous contrast: None.  Oral contrast: positive contrast was administered.  Sagittal and coronal reformats were performed.    Evaluation of the parenchymal organs and vascular structures is limited   without intravenous contrast.    FINDINGS:    LOWER CHEST: Bibasilar atelectasis. Coronary artery calcifications.    LIVER: Within normal limits.  BILE DUCTS: Normal caliber.  GALLBLADDER: Within normal limits.  SPLEEN: Within normal limits.  PANCREAS: Within normal limits.  ADRENALS: Mild nodular thickening of the bilateral adrenal glands,   unchanged.  KIDNEYS/URETERS: Bilateral renal cysts. Prominence of bilateral   collecting systems is likely secondary to distended urinary bladder.    BLADDER: Markedly distended. There is high density material layering in   the left posterior bladder lumen, new.  REPRODUCTIVE ORGANS: Uterus and adnexa within normal limits.    BOWEL: Marked distention of the large bowel measuring up to 13.2cm,   grossly similar to prior. No pneumatosis or free air. Appendix is not   definitely visualized.  PERITONEUM: No ascites.  VESSELS: Atherosclerotic changes.  RETROPERITONEUM/LYMPH NODES: No lymphadenopathy.    ABDOMINAL WALL: Anasarca.  BONES: Chronic right hip deformity. Chronic compression deformity of T11   and T12. Anterolisthesis of L3 on L4. Degenerative changes.    IMPRESSION:   Marked distention of the large bowel, grossly similar to prior. No   pneumatosis or intraperitoneal free air.  New small amount of high density material layering within the urinary   bladder. Question debris versus contrast.    TTE:  PROCEDURE: Transthoracic echocardiogram with 2-D, M-Mode  and complete spectral and color flow Doppler.  INDICATION: Heart failure, unspecified (I50.9)  ------------------------------------------------------------------------  Dimensions:    Normal Values:  LA:     3.0    2.0 - 4.0 cm  Ao:     2.9    2.0 - 3.8 cm  SEPTUM: 0.8    0.6 - 1.2 cm  PWT:    0.8    0.6 -1.1 cm  LVIDd:  3.9    3.0 - 5.6 cm  LVIDs:  2.8    1.8 - 4.0 cm  Derived variables:  LVMI: 53 g/m2  RWT: 0.41  Fractional short: 28 %  EF (Visual Estimate): 60 %  Doppler Peak Velocity (m/sec): AoV=1.3 TV=1.9  ------------------------------------------------------------------------  Observations:  Mitral Valve: Mitral annular calcification, otherwise  normal mitral valve. Mild mitral regurgitation.  Aortic Valve/Aorta: Aortic valve not well visualized;  appears calcified.  Aortic Root: 2.9 cm.  LVOT diameter: 1.8 cm.  Left Atrium: Normal left atrium.  LA volume index = 20  cc/m2.  Left Ventricle: Endocardium not well visualized; grossly  normal left ventricular systolic function. Normal left  ventricular internal dimensions and wall thicknesses.  Right Heart: Normal right atrium. The right ventricle is  not well visualized; grossly normal right ventricular  systolic function. Normal tricuspid valve. Minimal  tricuspid regurgitation. Normal pulmonic valve.  Pericardium/Pleura: Normal pericardium with no pericardial  effusion.  Hemodynamic: Estimated right atrial pressure is 8 mm Hg.  ------------------------------------------------------------------------  Conclusions:  1. Aortic valve not well visualized; appears calcified.  2. Endocardium not well visualized; grossly normal left  ventricular systolic function.  3. The right ventricle is not well visualized; grossly  normal right ventricular systolic function.

## 2019-11-05 NOTE — PROGRESS NOTE ADULT - ASSESSMENT
89 yo female with pmhx of HTN, HFpEF, chr afib who has been experiencing worsening weakness x 2 wks, s/p mech fall yesterday, CT and xrays neg. unable to ambulate.    # Fall/weakness FTT  PT eval [facility]  prn tylenol for pain  S&S recs pureed texture diet    # dyspnea  fu CXR  pulm consult  likely 2/2 holding of diuretics, will resume lasix 20mg qd    # leukocytosis  acute on chronic leukocytosis likely 2/2 urinary retention, now at baseline  appreciate ID recs, will observe off ABX, no signs of infection  urine and blood cx neg  repeat CT shows no change in colonic distention - will not pursue further investigation as family does not want aggressive intervention    # NITA  2/2 urinary retention and mod right hydro  >900cc urine in bladder on US, sp macedo placement for bladder decompression, failed TOV  started on flomax  will resume lasix today  appreciate renal recs  standing k supplement    #elev BS, new DM  elev hba1c 7.8  will be conservative given advance age  monitor bld sugars    #CHF, AFib  last echo aug 2018- normal study- normal LV systolic function  held home diuretics in the setting of hyponatremia and NITA, will now resume lasix 20mg qd  cont digoxin  mild trops d/t elev cr, ekg reviewed-sinus bakari 57    # HTN  cont metoprolol, resume lasix    #GOC - dw dgtr and no aggressive measures  DNR/DNI    [facility] [email or web address]  [phone]    dispo: pulm consult, possible DC to rehab with macedo

## 2019-11-05 NOTE — DISCHARGE NOTE PROVIDER - NSDCFUADDAPPT_GEN_ALL_CORE_FT
pt d/c with macedo as failed TOV  f/u at [facility] [address] [place] [address] [phone]

## 2019-11-05 NOTE — CONSULT NOTE ADULT - ASSESSMENT
ACute on chronic colonic distention: family declines rectal tube or other intervention  TTE: prior tchnically limited  No evidence of pulmonary edema on CXR post episode of tachypnea - patient remians with extrapulmonary restriction due to massive colonic distention. She has mild bilateral end-exp wheezes this AM and prior rhonchi. Suspect possible microaspiration given risks for dysphagia in setting of abd distention and age. SHe seems asymptomatic at current time.    REC    Would deferr DC to [facility] to observe 24 hours  Aspiration precautions: HOB elevated at 30-45 degrees as tolerated  Duoneb tid ordered

## 2019-11-05 NOTE — PROGRESS NOTE ADULT - ASSESSMENT
89 yo F w/ PMH HTN, CHF, PxAfib who presented for evaluation of fall 10/29/19. Found to have marked bowel distension and NITA. Nephrology is consulted for NITA management.    NITA  - baseline Cr in 2018 is 1.1  - admitted w/ elevated Cr  - NITA 2/2 urinary obstruction which resolved after macedo catheter placement    - macedo was removed this AM (11/4/19) and replaced 11/4/19 2/2 recurrent urinary retention  - continue flomax 0.4 mg daily    - Avoid nephrotoxins including NSAIDs, IV contrast (if possible), [employer] products  - maintain MAP >65    - monitor I/O's closely  - made ~2.1L urine yesterday  - monitor for post-obstructive diuresis    Hematuria  - likely traumatic from large volume diuresis from relief of obstruction  - macedo catheter placed 10/30/19 w/ over 1000 mL urine output  - urology following for hematuria  - trend H/H    Hypokalemia  - likely 2/2 poor PO intake  - resolved  - monitor    Proteinuria/hematuria  - likely due to UTI as this is new for her since admission  - repeat UA after Abx completed    Acidosis  - initially likely from GI losses  - now think it may be related to tachypnic state   - check ABG/VBG to eval pCO2 as this may be a compensatory acidosis  - less likely voltage-gated RTA 2/2 obstruction since she is not hyperkalemic  - monitor 89 yo F w/ PMH HTN, CHF, PxAfib who presented for evaluation of fall 10/29/19. Found to have marked bowel distension and NITA. Nephrology is consulted for NITA management.    NITA  - baseline Cr in 2018 is 1.1  - admitted w/ elevated Cr  - NITA 2/2 urinary obstruction which resolved after macedo catheter placement    - macedo was removed this AM (11/4/19) and replaced 11/4/19 2/2 recurrent urinary retention  - continue flomax 0.4 mg daily    - Avoid nephrotoxins including NSAIDs, IV contrast (if possible), [employer] products  - maintain MAP >65    - monitor I/O's closely  - made ~2.1L urine yesterday  - monitor for post-obstructive diuresis    - pt complaining of increased thirst and looks dry this morning  - recommend repeat CXR, ABG, pulm eval for tachypnea +/- lasix if she has pulm edema/hypoxia    Hematuria  - likely traumatic from large volume diuresis from relief of obstruction  - macedo catheter placed 10/30/19 w/ over 1000 mL urine output  - urology following for hematuria  - trend H/H    Hypokalemia  - likely 2/2 poor PO intake  - resolved  - monitor    Proteinuria/hematuria  - likely due to UTI as this is new for her since admission  - repeat UA after Abx completed    Acidosis  - initially likely from GI losses  - now think it may be related to tachypnic state   - check ABG/VBG to eval pCO2 as this may be a compensatory acidosis  - less likely voltage-gated RTA 2/2 obstruction since she is not hyperkalemic  - monitor

## 2019-11-05 NOTE — DISCHARGE NOTE PROVIDER - HOSPITAL COURSE
89 yo female with pmhx of HTN, HFpEF, chr afib who has been experiencing worsening weakness x 2 wks, s/p mech fall; CT and xrays neg. unable to ambulate. Family unable to care for pt at home 89 yo female with pmhx of HTN, HFpEF, chr afib who has been experiencing worsening weakness x 2 wks, s/p mech fall; CT and xrays neg. unable to ambulate. Family unable to care for pt at home;. Pt's CTAP  obtained because of abdominal distention recommended possible rectal tube and surg consult.  Pt and family declined this intervention. pt had macedo cath placed for retention and failed TOV 11/4; macedo replaced; urine and BC neg 89 yo female with pmhx of HTN, HFpEF, chr afib who has been experiencing worsening weakness x 2 wks, s/p mech fall; CT and xrays neg. unable to ambulate. Family unable to care for pt at home;. Pt's CTAP  obtained because of abdominal distention recommended possible rectal tube and surg consult.  Pt and family declined this intervention. pt had macedo cath placed for retention and failed TOV 11/4; macedo replaced; urine and BC neg; seen by speech and swallow  - npo recommended but family wants pleasure feeds - no PEG- placed on dyspahgia 1 with nectar thickened liquids 89 yo female with pmhx of HTN, HFpEF, chr afib who has been experiencing worsening weakness x 2 wks, s/p mech fall; CT and xrays neg. unable to ambulate. Family unable to care for pt at home;. Pt's CTAP  obtained because of abdominal distention recommended possible rectal tube and surg consult.  Pt and family declined this intervention. pt had macedo cath placed for retention and failed TOV 11/4; macedo replaced; urine and BC neg; seen by speech and swallow  - npo recommended but family wants pleasure feeds - no PEG- placed on dyspahgia 1 with honey thickened liquids 89 yo female with pmhx of HTN, HFpEF, chr afib who has been experiencing worsening weakness x 2 wks, s/p mech fall; CT and xrays neg. unable to ambulate. Family unable to care for pt at home;. Pt's CTAP  obtained because of abdominal distention recommended possible rectal tube and surg consult.  Pt and family declined this intervention. pt had macedo cath placed for retention and failed TOV 11/4; macedo replaced; urine and BC neg;pt with chronic leukocytosis - no need for antibiotics;  seen by speech and swallow  - npo recommended but family wants pleasure feeds - no PEG- placed on dyspahgia 1 with honey thickened liquids; diagnosed with NITA and hyponatremia - home diuretics held 89 yo female with pmhx of HTN, HFpEF, chr afib who has been experiencing worsening weakness x 2 wks, s/p mech fall; CT and xrays neg. unable to ambulate. Family unable to care for pt at home;. Pt's CTAP  obtained because of abdominal distention recommended possible rectal tube and surg consult.  Pt and family declined this intervention. pt had macedo cath placed for retention and failed TOV 11/4; macedo replaced; urine and BC neg;pt with chronic leukocytosis - no need for antibiotics;  seen by speech and swallow  - npo recommended but family wants pleasure feeds - no PEG- placed on dyspahgia 1 with honey thickened liquids; diagnosed with NITA and hyponatremia - home diuretics held; pt experienced some SOB 11/4 - diuretics resumed - seen by pulmonary 89 yo female with pmhx of HTN, HFpEF, chr afib who has been experiencing worsening weakness x 2 wks, s/p mech fall; CT and xrays neg. unable to ambulate. Family unable to care for pt at home;. Pt's CTAP  obtained because of abdominal distention recommended possible rectal tube and surg consult.  Pt and family declined this intervention. pt had macedo cath placed for retention and failed TOV 11/4; macedo replaced; urine and BC neg;pt with chronic leukocytosis - no need for antibiotics;  seen by speech and swallow  - npo recommended but family wants pleasure feeds - no PEG- placed on dyspahgia 1 with honey thickened liquids; diagnosed with NITA and hyponatremia - home diuretics held; pt experienced some SOB 11/4 - diuretics resumed - seen by pulmonary-    mk - no suspicion for pna; no pulm edema 89 yo female with pmhx of HTN, HFpEF, chr afib who has been experiencing worsening weakness x 2 wks, s/p mech fall; CT and xrays neg. unable to ambulate. Family unable to care for pt at home;. Pt's CTAP  obtained because of abdominal distention recommended possible rectal tube and surg consult.  Pt and family declined this intervention. pt had macedo cath placed for retention and failed TOV 11/4; macedo replaced; urine and BC neg;pt with chronic leukocytosis - no need for antibiotics;  seen by speech and swallow  - npo recommended but family wants pleasure feeds - no PEG- placed on dyspahgia 1 with honey thickened liquids; diagnosed with NITA and hyponatremia - home diuretics held; pt experienced some SOB 11/4 - diuretics resumed - seen by pulmonary-    mk - no suspicion for pna; no pulm edema- cleared for d/c by pulmonary and attending

## 2019-11-05 NOTE — PROGRESS NOTE ADULT - SUBJECTIVE AND OBJECTIVE BOX
[facility] [place] [facility]   MD Ivett Werner D.O. Fatima Sheikh, D.O. Ruoru Wong, PA    From 7 AM - 5 PM:  OFFICE: [phone]  Dr. Reyes cell: [phone]  Dr. Crouch cell: [phone]  Dr. Dumont cell: [phone]  CIERRA Vargas cell: [phone]    From 5 PM - 7 AM: Answering Service: [phone]        RENAL FOLLOW UP NOTE  --------------------------------------------------------------------------------  HPI: Pt seen and examined. States she is very thirsty this AM. Has no other symptoms/complaints today.    Macedo catheter placed 11/4/19 2/2 urinary retention.        PAST HISTORY  --------------------------------------------------------------------------------  No significant changes to PMH, PSH, FHx, SHx, unless otherwise noted    ALLERGIES & MEDICATIONS  --------------------------------------------------------------------------------  Allergies    No Known Allergies    Intolerances      Standing Inpatient Medications  ascorbic acid 500 milliGRAM(s) Oral daily  chlorhexidine 4% Liquid 1 Application(s) Topical daily  dextrose 5%. 1000 milliLiter(s) IV Continuous <Continuous>  dextrose 50% Injectable 12.5 Gram(s) IV Push once  dextrose 50% Injectable 25 Gram(s) IV Push once  dextrose 50% Injectable 25 Gram(s) IV Push once  digoxin     Tablet 0.125 milliGRAM(s) Oral daily  furosemide    Tablet 20 milliGRAM(s) Oral daily  heparin  Injectable 5000 Unit(s) SubCutaneous every 12 hours  insulin lispro (HumaLOG) corrective regimen sliding scale   SubCutaneous three times a day before meals  insulin lispro (HumaLOG) corrective regimen sliding scale   SubCutaneous at bedtime  metoprolol succinate  milliGRAM(s) Oral daily  multivitamin 1 Tablet(s) Oral daily  potassium chloride   Solution 40 milliEquivalent(s) Oral two times a day  senna 2 Tablet(s) Oral at bedtime  simethicone 80 milliGRAM(s) Chew every 6 hours  tamsulosin 0.4 milliGRAM(s) Oral at bedtime    PRN Inpatient Medications  dextrose 40% Gel 15 Gram(s) Oral once PRN  glucagon  Injectable 1 milliGRAM(s) IntraMuscular once PRN  polyethylene glycol 3350 17 Gram(s) Oral daily PRN      REVIEW OF SYSTEMS  --------------------------------------------------------------------------------  General: no fever  CVS: no chest pain  RESP: no sob, no cough  ABD: no abdominal pain  : no dysuria,  MSK: no edema     VITALS/PHYSICAL EXAM  --------------------------------------------------------------------------------  T(C): 36.4 (11-05-19 @ 05:23), Max: 36.6 (11-04-19 @ 20:29)  HR: 75 (11-05-19 @ 05:23) (56 - 75)  BP: 116/62 (11-05-19 @ 05:23) (116/62 - 129/60)  RR: 18 (11-05-19 @ 05:23) (16 - 26)  SpO2: 95% (11-05-19 @ 05:23) (95% - 97%)  Wt(kg): --        11-04-19 @ 07:01  -  11-05-19 @ 07:00  --------------------------------------------------------  IN: 1260 mL / OUT: 2180 mL / NET: -920 mL      Physical Exam:  	Gen: NAD  	HEENT: MM dry  	Pulm: CTA B/L  	CV: S1S2, + murmur  	Abd: Soft, +BS  	Ext: No LE edema B/L              : macedo catheter in place, draining clear yellow urine              Neuro: Awake   	Skin: Warm and Dry       LABS/STUDIES  --------------------------------------------------------------------------------              12.4   16.95 >-----------<  304      [11-04-19 @ 22:15]              39.5     141  |  110  |  53  ----------------------------<  220      [11-04-19 @ 22:15]  3.7   |  20  |  1.25        Ca     8.6     [11-04-19 @ 22:15]            Creatinine Trend:  SCr 1.25 [11-04 @ 22:15]  SCr 1.24 [11-04 @ 08:53]  SCr 1.23 [11-03 @ 08:37]  SCr 1.06 [11-02 @ 07:01]  SCr 1.27 [11-01 @ 06:31]    Urinalysis - [10-28-19 @ 08:24]      Color Yellow / Appearance Slightly Turbid / SG 1.017 / pH 8.5      Gluc Negative / Ketone Negative  / Bili Negative / Urobili Negative       Blood Moderate / Protein 30 mg/dL / Leuk Est Large / Nitrite Positive      RBC >50 / WBC 7 / Hyaline 0 / Gran  / Sq Epi  / Non Sq Epi 8 / Bacteria Moderate      HbA1c 7.8      [10-29-19 @ 13:12]

## 2019-11-05 NOTE — DISCHARGE NOTE PROVIDER - NSDCCPCAREPLAN_GEN_ALL_CORE_FT
PRINCIPAL DISCHARGE DIAGNOSIS  Diagnosis: Generalized weakness  Assessment and Plan of Treatment: d/c to [facility]  with transition to long term care      SECONDARY DISCHARGE DIAGNOSES  Diagnosis: Urinary retention  Assessment and Plan of Treatment: d/c with macedo; take prescribed medication; f/u with urology    Diagnosis: HTN (hypertension)  Assessment and Plan of Treatment: take prescribed medication; f/u with PCP    Diagnosis: Chronic CHF  Assessment and Plan of Treatment: take prescribed medication; f/u with PCP    Diagnosis: NITA (acute kidney injury)  Assessment and Plan of Treatment: resolved; take prescribed medication

## 2019-11-05 NOTE — DISCHARGE NOTE PROVIDER - NSDCMRMEDTOKEN_GEN_ALL_CORE_FT
digoxin 125 mcg (0.125 mg) oral tablet: 1 tab(s) orally once a day  furosemide 20 mg oral tablet: 1 tab(s) orally once a day  hydrochlorothiazide-triamterene 25 mg-37.5 mg oral capsule: 1 cap(s) orally once a day  metoprolol succinate 100 mg oral tablet, extended release: 1 tab(s) orally once a day ascorbic acid 500 mg oral tablet: 1 tab(s) orally once a day  digoxin 125 mcg (0.125 mg) oral tablet: 1 tab(s) orally once a day  furosemide 20 mg oral tablet: 1 tab(s) orally once a day  HumaLOG 100 units/mL subcutaneous solution: sliding scale before meals  for -200 1 unit  for -250 2 units  for -300 3 units  for -350 4 units  for bs 351-400 5 units  for BS. 400 6 units  HumaLOG 100 units/mL subcutaneous solution: sliding scale beofre bed  for BS 0-250 0 units  for -300 1 unit  for -350 2 units  for -400 3 units  for BS&gt; 400 4 units  metoprolol succinate 100 mg oral tablet, extended release: 1 tab(s) orally once a day  Multiple Vitamins oral tablet: 1 tab(s) orally once a day  polyethylene glycol 3350 oral powder for reconstitution: 17 gram(s) orally once a day, As needed, Constipation  potassium chloride 20 mEq/15 mL oral liquid: 30 milliliter(s) orally 2 times a day  senna oral tablet: 2 tab(s) orally once a day (at bedtime)  simethicone 80 mg oral tablet, chewable: 1 tab(s) orally every 6 hours  tamsulosin 0.4 mg oral capsule: 1 cap(s) orally once a day (at bedtime) ascorbic acid 500 mg oral tablet: 1 tab(s) orally once a day  digoxin 125 mcg (0.125 mg) oral tablet: 1 tab(s) orally once a day  furosemide 20 mg oral tablet: 1 tab(s) orally every other day  HumaLOG 100 units/mL subcutaneous solution: sliding scale before meals  for -200 1 unit  for -250 2 units  for -300 3 units  for -350 4 units  for bs 351-400 5 units  for BS. 400 6 units  HumaLOG 100 units/mL subcutaneous solution: sliding scale beofre bed  for BS 0-250 0 units  for -300 1 unit  for -350 2 units  for -400 3 units  for BS&gt; 400 4 units  metoprolol succinate 100 mg oral tablet, extended release: 1 tab(s) orally once a day  Multiple Vitamins oral tablet: 1 tab(s) orally once a day  polyethylene glycol 3350 oral powder for reconstitution: 17 gram(s) orally once a day, As needed, Constipation  potassium chloride 20 mEq/15 mL oral liquid: 30 milliliter(s) orally 2 times a day  senna oral tablet: 2 tab(s) orally once a day (at bedtime)  simethicone 80 mg oral tablet, chewable: 1 tab(s) orally every 6 hours  tamsulosin 0.4 mg oral capsule: 1 cap(s) orally once a day (at bedtime)

## 2019-11-06 VITALS
HEART RATE: 60 BPM | SYSTOLIC BLOOD PRESSURE: 111 MMHG | TEMPERATURE: 98 F | DIASTOLIC BLOOD PRESSURE: 66 MMHG | OXYGEN SATURATION: 93 % | RESPIRATION RATE: 18 BRPM

## 2019-11-06 LAB
ANION GAP SERPL CALC-SCNC: 14 MMOL/L — SIGNIFICANT CHANGE UP (ref 5–17)
BUN SERPL-MCNC: 61 MG/DL — HIGH (ref 7–23)
CALCIUM SERPL-MCNC: 9.2 MG/DL — SIGNIFICANT CHANGE UP (ref 8.4–10.5)
CHLORIDE SERPL-SCNC: 108 MMOL/L — SIGNIFICANT CHANGE UP (ref 96–108)
CO2 SERPL-SCNC: 19 MMOL/L — LOW (ref 22–31)
CREAT SERPL-MCNC: 1.43 MG/DL — HIGH (ref 0.5–1.3)
GLUCOSE BLDC GLUCOMTR-MCNC: 177 MG/DL — HIGH (ref 70–99)
GLUCOSE BLDC GLUCOMTR-MCNC: 192 MG/DL — HIGH (ref 70–99)
GLUCOSE BLDC GLUCOMTR-MCNC: 213 MG/DL — HIGH (ref 70–99)
GLUCOSE SERPL-MCNC: 183 MG/DL — HIGH (ref 70–99)
HCT VFR BLD CALC: 40.3 % — SIGNIFICANT CHANGE UP (ref 34.5–45)
HGB BLD-MCNC: 12.6 G/DL — SIGNIFICANT CHANGE UP (ref 11.5–15.5)
MCHC RBC-ENTMCNC: 29.9 PG — SIGNIFICANT CHANGE UP (ref 27–34)
MCHC RBC-ENTMCNC: 31.3 GM/DL — LOW (ref 32–36)
MCV RBC AUTO: 95.5 FL — SIGNIFICANT CHANGE UP (ref 80–100)
PLATELET # BLD AUTO: 323 K/UL — SIGNIFICANT CHANGE UP (ref 150–400)
POTASSIUM SERPL-MCNC: 3.8 MMOL/L — SIGNIFICANT CHANGE UP (ref 3.5–5.3)
POTASSIUM SERPL-SCNC: 3.8 MMOL/L — SIGNIFICANT CHANGE UP (ref 3.5–5.3)
RBC # BLD: 4.22 M/UL — SIGNIFICANT CHANGE UP (ref 3.8–5.2)
RBC # FLD: 14.5 % — SIGNIFICANT CHANGE UP (ref 10.3–14.5)
SODIUM SERPL-SCNC: 141 MMOL/L — SIGNIFICANT CHANGE UP (ref 135–145)
WBC # BLD: 16.61 K/UL — HIGH (ref 3.8–10.5)
WBC # FLD AUTO: 16.61 K/UL — HIGH (ref 3.8–10.5)

## 2019-11-06 RX ADMIN — Medication 3 MILLILITER(S): at 00:33

## 2019-11-06 RX ADMIN — HEPARIN SODIUM 5000 UNIT(S): 5000 INJECTION INTRAVENOUS; SUBCUTANEOUS at 18:02

## 2019-11-06 RX ADMIN — Medication 100 MILLIGRAM(S): at 06:39

## 2019-11-06 RX ADMIN — Medication 40 MILLIEQUIVALENT(S): at 06:39

## 2019-11-06 RX ADMIN — Medication 3 MILLILITER(S): at 12:52

## 2019-11-06 RX ADMIN — Medication 0.12 MILLIGRAM(S): at 06:39

## 2019-11-06 RX ADMIN — Medication 3 MILLILITER(S): at 06:39

## 2019-11-06 RX ADMIN — Medication 3 MILLIGRAM(S): at 00:33

## 2019-11-06 RX ADMIN — Medication 500 MILLIGRAM(S): at 12:51

## 2019-11-06 RX ADMIN — Medication 20 MILLIGRAM(S): at 06:39

## 2019-11-06 RX ADMIN — Medication 1 TABLET(S): at 12:51

## 2019-11-06 RX ADMIN — SIMETHICONE 80 MILLIGRAM(S): 80 TABLET, CHEWABLE ORAL at 12:51

## 2019-11-06 RX ADMIN — Medication 2: at 12:51

## 2019-11-06 RX ADMIN — SIMETHICONE 80 MILLIGRAM(S): 80 TABLET, CHEWABLE ORAL at 06:39

## 2019-11-06 RX ADMIN — CHLORHEXIDINE GLUCONATE 1 APPLICATION(S): 213 SOLUTION TOPICAL at 11:45

## 2019-11-06 RX ADMIN — SIMETHICONE 80 MILLIGRAM(S): 80 TABLET, CHEWABLE ORAL at 00:33

## 2019-11-06 RX ADMIN — Medication 1: at 08:52

## 2019-11-06 RX ADMIN — Medication 1: at 18:07

## 2019-11-06 RX ADMIN — HEPARIN SODIUM 5000 UNIT(S): 5000 INJECTION INTRAVENOUS; SUBCUTANEOUS at 06:39

## 2019-11-06 RX ADMIN — Medication 40 MILLIEQUIVALENT(S): at 18:01

## 2019-11-06 NOTE — PROGRESS NOTE ADULT - ASSESSMENT
ACute on chronic colonic distention  Resp Insufficiency due to extrapulmonaryr restricition  Dyspnea improved - likely due to basilar compressive atelectasis and impaired secretion mobilization - no suspicion of pneumonia.  Not in pulm edema    REC    Would hold diuretics  Aspiration precautions  DC planning

## 2019-11-06 NOTE — DISCHARGE NOTE NURSING/CASE MANAGEMENT/SOCIAL WORK - NSDCFUADDAPPT_GEN_ALL_CORE_FT
pt d/c with macedo as failed TOV  f/u at [facility] [address] [place] [address] [phone]

## 2019-11-06 NOTE — PROGRESS NOTE ADULT - SUBJECTIVE AND OBJECTIVE BOX
[facility] [place] [facility]   MD Ivett Werner D.O. Fatima Sheikh, D.O. Ruoru Wong, PA    From 7 AM - 5 PM:  OFFICE: [phone]  Dr. Reyes cell: [phone]  Dr. Crouch cell: [phone]  Dr. Dumont cell: [phone]  CIERRA Vargas cell: [phone]    From 5 PM - 7 AM: Answering Service: [phone]        RENAL FOLLOW UP NOTE  --------------------------------------------------------------------------------  HPI: Pt seen and examined. Feels SOB this AM. Getting neb Tx this AM. Denies any other symptoms except for feeling thirsty.        PAST HISTORY  --------------------------------------------------------------------------------  No significant changes to PMH, PSH, FHx, SHx, unless otherwise noted    ALLERGIES & MEDICATIONS  --------------------------------------------------------------------------------  Allergies    No Known Allergies    Intolerances      Standing Inpatient Medications  albuterol/ipratropium for Nebulization 3 milliLiter(s) Nebulizer three times a day  ascorbic acid 500 milliGRAM(s) Oral daily  chlorhexidine 4% Liquid 1 Application(s) Topical daily  dextrose 5%. 1000 milliLiter(s) IV Continuous <Continuous>  dextrose 50% Injectable 12.5 Gram(s) IV Push once  dextrose 50% Injectable 25 Gram(s) IV Push once  dextrose 50% Injectable 25 Gram(s) IV Push once  digoxin     Tablet 0.125 milliGRAM(s) Oral daily  furosemide    Tablet 20 milliGRAM(s) Oral daily  heparin  Injectable 5000 Unit(s) SubCutaneous every 12 hours  insulin lispro (HumaLOG) corrective regimen sliding scale   SubCutaneous three times a day before meals  insulin lispro (HumaLOG) corrective regimen sliding scale   SubCutaneous at bedtime  metoprolol succinate  milliGRAM(s) Oral daily  multivitamin 1 Tablet(s) Oral daily  potassium chloride   Solution 40 milliEquivalent(s) Oral two times a day  simethicone 80 milliGRAM(s) Chew every 6 hours  tamsulosin 0.4 milliGRAM(s) Oral at bedtime    PRN Inpatient Medications  dextrose 40% Gel 15 Gram(s) Oral once PRN  glucagon  Injectable 1 milliGRAM(s) IntraMuscular once PRN  polyethylene glycol 3350 17 Gram(s) Oral daily PRN      REVIEW OF SYSTEMS  --------------------------------------------------------------------------------  General: no fever  CVS: no chest pain  RESP: no sob, no cough  ABD: no abdominal pain  : no dysuria,  MSK: no edema     VITALS/PHYSICAL EXAM  --------------------------------------------------------------------------------  T(C): 36.3 (11-06-19 @ 13:37), Max: 36.4 (11-05-19 @ 23:32)  HR: 67 (11-06-19 @ 14:17) (65 - 70)  BP: 101/62 (11-06-19 @ 13:37) (101/62 - 134/72)  RR: 20 (11-06-19 @ 14:17) (18 - 26)  SpO2: 93% (11-06-19 @ 14:17) (93% - 100%)  Wt(kg): --        11-05-19 @ 07:01  -  11-06-19 @ 07:00  --------------------------------------------------------  IN: 1320 mL / OUT: 750 mL / NET: 570 mL    11-06-19 @ 07:01  -  11-06-19 @ 15:38  --------------------------------------------------------  IN: 540 mL / OUT: 850 mL / NET: -310 mL      Physical Exam:  	Gen: NAD  	HEENT: MM dry  	Pulm: B/L rhonchi noted.  	CV: S1S2, + murmur  	Abd: Soft, +BS  	Ext: No LE edema B/L              : macedo catheter w/ bloody sediment in tubing.               Neuro: Awake   	Skin: Warm and Dry       LABS/STUDIES  --------------------------------------------------------------------------------              12.6   16.61 >-----------<  323      [11-06-19 @ 09:12]              40.3     141  |  108  |  61  ----------------------------<  183      [11-06-19 @ 06:35]  3.8   |  19  |  1.43        Ca     9.2     [11-06-19 @ 06:35]            Creatinine Trend:  SCr 1.43 [11-06 @ 06:35]  SCr 1.25 [11-04 @ 22:15]  SCr 1.24 [11-04 @ 08:53]  SCr 1.23 [11-03 @ 08:37]  SCr 1.06 [11-02 @ 07:01]    Urinalysis - [10-28-19 @ 08:24]      Color Yellow / Appearance Slightly Turbid / SG 1.017 / pH 8.5      Gluc Negative / Ketone Negative  / Bili Negative / Urobili Negative       Blood Moderate / Protein 30 mg/dL / Leuk Est Large / Nitrite Positive      RBC >50 / WBC 7 / Hyaline 0 / Gran  / Sq Epi  / Non Sq Epi 8 / Bacteria Moderate      HbA1c 7.8      [10-29-19 @ 13:12]

## 2019-11-06 NOTE — PROGRESS NOTE ADULT - ASSESSMENT
89 yo F w/ PMH HTN, CHF, PxAfib who presented for evaluation of fall 10/29/19. Found to have marked bowel distension and NITA. Nephrology is consulted for NITA management.    NITA  - baseline Cr in 2018 is 1.1  - admitted w/ elevated Cr  - NITA 2/2 urinary obstruction which resolved after macedo catheter placement    - Avoid nephrotoxins including NSAIDs, IV contrast (if possible), Fleet's products  - maintain MAP >65    - monitor I/O's closely  - maintain macedo  - f/u urology recs    - hold diuresis today as she appears hypovolemic  - encourage PO intake  - consider small bolus of IV NS (250-500 mL) as she appears dry and BP is soft today    Hematuria  - likely traumatic from large volume diuresis from relief of obstruction  - macedo catheter placed 10/30/19 w/ over 1000 mL urine output; removed 11/4/19 and replaced 11/4/19 evening 2/2 recurrent urinary retention  - continue flomax 0.4 mg daily  - urology following for hematuria/urinary retention    Hypokalemia  - likely 2/2 poor PO intake  - resolved  - monitor    Proteinuria/hematuria  - likely due to UTI as this is new for her since admission  - repeat UA after Abx completed    Acidosis  - initially likely from GI losses + respiratory acidosis  - may have persistent acidosis 2/2 voltage-gated RTA 2/2 obstruction  - if bicarb remains <22, recommend starting sodium bicarb 650 mg daily  - monitor

## 2019-11-06 NOTE — PROVIDER CONTACT NOTE (CHANGE IN STATUS NOTIFICATION) - BACKGROUND
Pt has urinary retention. Macedo dc'd on 11/4 urine blood tinged at the time per NP Saima, pt failed TOV and macedo was reinserted on 11/4. PEr NP Saima, macedo urine was clear yeserday.

## 2019-11-06 NOTE — DISCHARGE NOTE NURSING/CASE MANAGEMENT/SOCIAL WORK - PATIENT PORTAL LINK FT
You can access the FollowMyHealth Patient Portal offered by [facility] by registering at the following website: [email or web address]. By joining [email or web address] FollowMyHealth portal, you will also be able to view your health information using other applications (apps) compatible with our system.

## 2019-11-06 NOTE — PROGRESS NOTE ADULT - PROVIDER SPECIALTY LIST ADULT
Infectious Disease
Infectious Disease
Internal Medicine
Nephrology
Pulmonology
Internal Medicine

## 2019-11-06 NOTE — CHART NOTE - NSCHARTNOTEFT_GEN_A_CORE
Urology called to evaluate hematuria since macedo replaced after failed trial of void.  Pt seen and examined pt at bed side. Macedo in place, draining well, light pink urine. Irrigated with normal saline, one stringy clot drained. Urine color now clear yellow.      -Continue to monitor urine color  -RN may irrigate if needed  -No acute  intervention  -May follow up with next available urologist at the [facility]  [address] [place], [place] [id number] [phone]

## 2019-11-06 NOTE — PROGRESS NOTE ADULT - REASON FOR ADMISSION
sp fall unable to ambulate

## 2019-11-06 NOTE — PROGRESS NOTE ADULT - SUBJECTIVE AND OBJECTIVE BOX
Follow-up Pulm Progress Note  Vincenzo Bright MD  [phone]    AFebrile, stable cardio-resp  Hematuria noted  Awake, denies dyspnea  Creatinine increased and patient says she's thirsty      Medications:  Vital Signs Last 24 Hrs  T(C): 36.4 (06 Nov 2019 05:54), Max: 36.6 (05 Nov 2019 13:21)  T(F): 97.5 (06 Nov 2019 05:54), Max: 97.9 (05 Nov 2019 13:21)  HR: 69 (06 Nov 2019 05:54) (67 - 70)  BP: 110/65 (06 Nov 2019 05:54) (108/64 - 134/72)  BP(mean): --  RR: 19 (06 Nov 2019 05:54) (19 - 26)  SpO2: 97% (06 Nov 2019 05:54) (95% - 97%)  ABG - ( 04 Nov 2019 22:23 )  pH, Arterial: 7.30  pH, Blood: x     /  pCO2: 46    /  pO2: 97    / HCO3: 22    / Base Excess: -4.4  /  SaO2: 98        11-05 @ 07:01  -  11-06 @ 07:00  --------------------------------------------------------  IN: 1320 mL / OUT: 750 mL / NET: 570 mL    LABS:                        12.6   16.61 )-----------( 323      ( 06 Nov 2019 09:12 )             40.3     11-06    141  |  108  |  61<H>  ----------------------------<  183<H>  3.8   |  19<L>  |  1.43<H>    Ca    9.2      06 Nov 2019 06:35    CULTURES:  Culture Results:   GI PCR Results: NOT detected  *******Please Note:*******  GI panel PCR evaluates for:  Campylobacter, Plesiomonas shigelloides, Salmonella,  Vibrio, Yersinia enterocolitica, Enteroaggregative  Escherichia coli (EAEC), Enteropathogenic E.coli (EPEC),  Enterotoxigenic E. coli (ETEC) lt/st, Shiga-like  toxin-producing E. coli (STEC) stx1/stx2,  Shigella/ Enteroinvasive E. coli (EIEC), Cryptosporidium,  Cyclospora cayetanensis, Entamoeba histolytica,  Giardia lamblia, Adenovirus F 40/41, Astrovirus,  Norovirus GI/GII, Rotavirus A, Sapovirus (11-05 @ 19:00)    Most recent blood culture -- 11-05 @ 19:00   -- -- .Stool Feces 11-05 @ 19:00      Physical Examination:  PULM:   CVS: Regular rate and rhythm, no murmurs, rubs, or gallops  ABD: Soft, non-tender  EXT:  No clubbing, cyanosis, or edema    RADIOLOGY REVIEWED  CXR:    CT chest:    TTE:

## 2019-11-12 PROCEDURE — 73562 X-RAY EXAM OF KNEE 3: CPT

## 2019-11-12 PROCEDURE — 94640 AIRWAY INHALATION TREATMENT: CPT

## 2019-11-12 PROCEDURE — 83036 HEMOGLOBIN GLYCOSYLATED A1C: CPT

## 2019-11-12 PROCEDURE — 87040 BLOOD CULTURE FOR BACTERIA: CPT

## 2019-11-12 PROCEDURE — 86900 BLOOD TYPING SEROLOGIC ABO: CPT

## 2019-11-12 PROCEDURE — 86850 RBC ANTIBODY SCREEN: CPT

## 2019-11-12 PROCEDURE — 36600 WITHDRAWAL OF ARTERIAL BLOOD: CPT

## 2019-11-12 PROCEDURE — 73090 X-RAY EXAM OF FOREARM: CPT

## 2019-11-12 PROCEDURE — 97116 GAIT TRAINING THERAPY: CPT

## 2019-11-12 PROCEDURE — 83735 ASSAY OF MAGNESIUM: CPT

## 2019-11-12 PROCEDURE — 97530 THERAPEUTIC ACTIVITIES: CPT

## 2019-11-12 PROCEDURE — 73060 X-RAY EXAM OF HUMERUS: CPT

## 2019-11-12 PROCEDURE — 87486 CHLMYD PNEUM DNA AMP PROBE: CPT

## 2019-11-12 PROCEDURE — 74176 CT ABD & PELVIS W/O CONTRAST: CPT

## 2019-11-12 PROCEDURE — 82962 GLUCOSE BLOOD TEST: CPT

## 2019-11-12 PROCEDURE — 86901 BLOOD TYPING SEROLOGIC RH(D): CPT

## 2019-11-12 PROCEDURE — 72125 CT NECK SPINE W/O DYE: CPT

## 2019-11-12 PROCEDURE — 76770 US EXAM ABDO BACK WALL COMP: CPT

## 2019-11-12 PROCEDURE — 87086 URINE CULTURE/COLONY COUNT: CPT

## 2019-11-12 PROCEDURE — 80053 COMPREHEN METABOLIC PANEL: CPT

## 2019-11-12 PROCEDURE — 82803 BLOOD GASES ANY COMBINATION: CPT

## 2019-11-12 PROCEDURE — 92610 EVALUATE SWALLOWING FUNCTION: CPT

## 2019-11-12 PROCEDURE — 70450 CT HEAD/BRAIN W/O DYE: CPT

## 2019-11-12 PROCEDURE — 83605 ASSAY OF LACTIC ACID: CPT

## 2019-11-12 PROCEDURE — 87507 IADNA-DNA/RNA PROBE TQ 12-25: CPT

## 2019-11-12 PROCEDURE — 80162 ASSAY OF DIGOXIN TOTAL: CPT

## 2019-11-12 PROCEDURE — 85652 RBC SED RATE AUTOMATED: CPT

## 2019-11-12 PROCEDURE — 73502 X-RAY EXAM HIP UNI 2-3 VIEWS: CPT

## 2019-11-12 PROCEDURE — 93005 ELECTROCARDIOGRAM TRACING: CPT

## 2019-11-12 PROCEDURE — 86140 C-REACTIVE PROTEIN: CPT

## 2019-11-12 PROCEDURE — 81001 URINALYSIS AUTO W/SCOPE: CPT

## 2019-11-12 PROCEDURE — 87581 M.PNEUMON DNA AMP PROBE: CPT

## 2019-11-12 PROCEDURE — 80048 BASIC METABOLIC PNL TOTAL CA: CPT

## 2019-11-12 PROCEDURE — 73552 X-RAY EXAM OF FEMUR 2/>: CPT

## 2019-11-12 PROCEDURE — 71045 X-RAY EXAM CHEST 1 VIEW: CPT

## 2019-11-12 PROCEDURE — 99285 EMERGENCY DEPT VISIT HI MDM: CPT

## 2019-11-12 PROCEDURE — 85027 COMPLETE CBC AUTOMATED: CPT

## 2019-11-12 PROCEDURE — 87798 DETECT AGENT NOS DNA AMP: CPT

## 2019-11-12 PROCEDURE — 84484 ASSAY OF TROPONIN QUANT: CPT

## 2019-11-12 PROCEDURE — 99284 EMERGENCY DEPT VISIT MOD MDM: CPT | Mod: 25

## 2019-11-12 PROCEDURE — 97161 PT EVAL LOW COMPLEX 20 MIN: CPT

## 2019-11-12 PROCEDURE — 87633 RESP VIRUS 12-25 TARGETS: CPT

## 2019-11-12 PROCEDURE — 73070 X-RAY EXAM OF ELBOW: CPT

## 2020-02-18 NOTE — PHYSICAL THERAPY INITIAL EVALUATION ADULT - ASSISTIVE DEVICE FOR TRANSFER: STAND/SIT, REHAB EVAL
-- DO NOT REPLY / DO NOT REPLY ALL --  -- Message is from the [facility]--    General Patient Message      Reason for Call: patient's daughter is calling to relay information regarding Neurologist for referral purposes for alzheimer and dementia conditions, Dr. Cornell Camarillo [address] [facility]) [phone], please advise. Patient's daughter is requesting a callback to be informed when referral has been added to Epic so they can make an appt.     Caller Information       Type Contact Phone    02/18/2020 03:53 PM Phone (Incoming) Carmen Villeda (Emergency Contact) [phone]          Alternative phone number: none     Turnaround time given to caller:   \"This message will be sent to [state Provider's name]. The clinical team will fulfill your request as soon as they review your message when the office opens tomorrow.\"}     rolling walker

## 2020-12-10 NOTE — H&P ADULT - NSHPPOAURINARYCATHETER_GEN_ALL_CORE
no
Detail Level: Detailed
Depth Of Biopsy: dermis
Was A Bandage Applied: Yes
Size Of Lesion In Cm: 0.2
X Size Of Lesion In Cm: 0
Biopsy Type: H and E
Biopsy Method: Dermablade
Anesthesia Type: 1% lidocaine with epinephrine and a 1:10 solution of 8.4% sodium bicarbonate
Anesthesia Volume In Cc (Will Not Render If 0): 0.5
Hemostasis: Drysol
Wound Care: Petrolatum
Dressing: Band-Aid
Destruction After The Procedure: No
Type Of Destruction Used: Curettage
Curettage Text: The wound bed was treated with curettage after the biopsy was performed.
Cryotherapy Text: The wound bed was treated with cryotherapy after the biopsy was performed.
Electrodesiccation Text: The wound bed was treated with electrodesiccation after the biopsy was performed.
Electrodesiccation And Curettage Text: The wound bed was treated with electrodesiccation and curettage after the biopsy was performed.
Silver Nitrate Text: The wound bed was treated with silver nitrate after the biopsy was performed.
Lab: 6
Lab Facility: [id number]
Consent: Verbal or written consent was obtained and risks were reviewed including but not limited to scarring, infection, bleeding, scabbing, incomplete removal, nerve damage and allergy to anesthesia.
Post-Care Instructions: I reviewed with the patient and staff in detail post-care instructions. Patient is to keep the biopsy site dry overnight, and then apply bacitracin QD until healed.
Notification Instructions: Patient will be notified of biopsy results. However, patient instructed to call the office if not contacted within 2 weeks.
Billing Type: Third-Party Bill
Information: Selecting Yes will display possible errors in your note based on the variables you have selected. This validation is only offered as a suggestion for you. PLEASE NOTE THAT THE VALIDATION TEXT WILL BE REMOVED WHEN YOU FINALIZE YOUR NOTE. IF YOU WANT TO FAX A PRELIMINARY NOTE YOU WILL NEED TO TOGGLE THIS TO 'NO' IF YOU DO NOT WANT IT IN YOUR FAXED NOTE.

## 2021-08-17 NOTE — PHYSICAL THERAPY INITIAL EVALUATION ADULT - DISCHARGE PLANNER MADE AWARE
Quality 130: Documentation Of Current Medications In The Medical Record: Current Medications Documented Quality 431: Preventive Care And Screening: Unhealthy Alcohol Use - Screening: Patient screened for unhealthy alcohol use using a single question and scores 2 or greater episodes per year and brief intervention occurred Quality 226: Preventive Care And Screening: Tobacco Use: Screening And Cessation Intervention: Patient screened for tobacco use and is an ex/non-smoker Detail Level: Detailed yes

## 2021-11-04 NOTE — ED PROVIDER NOTE - NS ED ATTENDING STATEMENT MOD
pain, back
I have personally seen and examined this patient.  I have fully participated in the care of this patient. I have reviewed all pertinent clinical information, including history, physical exam, plan and the Resident’s note and agree except as noted.

## 2021-12-01 NOTE — PATIENT PROFILE ADULT. - MENTAL HEALTH CONDITIONS/SYMPTOMS, PROFILE
I informed the patient about the results of the descending colon mass positive for adenocarcinoma.  Patient was still inpatient at [facility].  Wife present at the bedside.  Follow-up with surgery and oncology  
none

## 2021-12-14 NOTE — SWALLOW BEDSIDE ASSESSMENT ADULT - SWALLOW EVAL: CURRENT DIET
Patient presents with symptoms of burning, frequency for 2-3 days.   No fever. No back pain.  No nausea or vomiting. Denies vaginal sx's.  No hx of frequent uti's or kidney problems.    RN's note was reviewed.  Past Medical History:   Diagnosis Date   • Depression    • Dysmenorrhea    • Ectopic pregnancy     12/2020   • Family history of malignant neoplasm of breast 9/21/2018   • Microscopic hematuria     Resolved   • Miscarriage 3/2003   • Negative History of CA, HTN, DM, CAD, CVA, DVT, Asthma      Past Surgical History:   Procedure Laterality Date   • Anes surg tx ectopic pg; req salpingect  12/2020     Current Outpatient Medications   Medication Sig   • valACYclovir (Valtrex) 500 MG tablet Take 1 tablet by mouth 2 times daily.   • VITAMIN D, ERGOCALCIFEROL, PO Take 10,000 Units by mouth 1 day a week.   • loratadine (CLARITIN) 10 MG tablet Take 1 tablet by mouth 2 times daily for 7 days.     No current facility-administered medications for this visit.         O: Negative CVA/positive suprapubic tenderness.   Urine reveals pyuria and bactiuria.    A/p: Urinary tract infection  Push fluids;     Macrobid 100 mg po bid with food X 7 days. F/U if sx's persist after 48-72's; otherwise prn.      culture PENDING    DECKINED PELVIC; PRETTY SURE SHE  HAS A YEAST NFECTION; AGRRED TO DIFLUCAN     Dysphagia 1 with no PO liquids Dysphagia 1 with thin liquid

## 2022-01-10 NOTE — ED ADULT TRIAGE NOTE - MODE OF ARRIVAL
Patient Specific Counseling (Will Not Stick From Patient To Patient): Hold more advanced therapy at this time Detail Level: Detailed EMS

## 2022-12-14 NOTE — ED PROVIDER NOTE - PLAN OF CARE
Advised to call immediately if eye pain or loss of vision. Follow up with your cardiologist within 24 hours.  Return to the ED if you have shortness of breath, chest pain, abdominal pain Follow up with your cardiologist within 24 hours.  Return to the ED if you have shortness of breath, chest pain, abdominal pain or any other concerning symptoms.  Take medications as previously prescrivbed

## 2023-05-26 NOTE — H&P ADULT - HISTORY OF PRESENT ILLNESS
Ms. Obrien is a 89 yo woman, independent in ADLs and IADLs w/ home walker + lives independently downstairs from daughter w/ no glasses or hearing aids, w/ hfpef + afib no AC who p/w SOB in setting of eating a pint of chinese food last night. She awoke at 1AM to phone her daughter and tell her that she's not breathing quite right. Per daughter who came right up stairs, no diaphoresis or paleness, just took patient to hospital based on subjective report. Patient was still ambulating independently at the time, just uncomfortable and tachypneic. Patient denies CP or arm/shoulder/neck/jaw pain. No missed doses of diuretic. No recent sickness, no fevers or chills.     In ED was SOB and tachypneic to high 20s required BiPAP w/ initial vitals 200/90, T97.5, HR 64, RR 26, 100% on BiPAP. Labs w/ leukocytosis 15, cbc otherwise unremarkable, BMP notable for BUN/Cr 30/1.02. Trop 24-->23. . CXR clear though w/ cephalization to my eye c/w vascular congestion, EKG w/ TWI in V2-V6, flat TW inferiorly, QTc 416. Given lasix 40 IV x1 w/ rapid improvement in breathing and BP, BiPAP removed and satting well on RA, transferred to [facility] for further management. Labs/Imaging Studies/Medications

## 2023-09-29 NOTE — PROGRESS NOTE ADULT - PROBLEM/PLAN-5
DISPLAY PLAN FREE TEXT
Patient would like to speak with you  
DISPLAY PLAN FREE TEXT
DISPLAY PLAN FREE TEXT

## 2024-05-21 NOTE — SWALLOW BEDSIDE ASSESSMENT ADULT - SLP GENERAL OBSERVATIONS
Pt received reclined in bed. Grossly A&Ox3. +1-2L NC. Able to follow commands and relay wants and needs. Significant abdominal distention noted. Pt positioned upright for this exam. Baseline RR 32. RN Alicia made aware. RN came to assess->pt in no distress, SpO2 WNL. Per RN, upright positioning and abdominal distention may be contributing to increased WOB. Pt reclined to 40 degrees and per RN respiratory rate improved. At this time, pt's daughter provided pt with sip of thin liquid. Pt with immediate significant coughing (wet, weak, congested cough). NP Ly made aware. of above. RN at the bedside and discussed with NP as well. Pt cleared to proceed with swallow assessment. Of note, NP made aware that pt with increased baseline coughing today. Pt received reclined in bed. Grossly A&Ox3. +1-2L NC. Able to follow commands and relay wants and needs. Significant abdominal distention noted. Pt positioned upright for this exam. Baseline RR 32. RN Alicia made aware. RN came to assess->pt in no distress, SpO2 WNL. Per RN, upright positioning and abdominal distention may be contributing to increased WOB. Pt reclined to 40 degrees and per RN respiratory rate improved. At this time, pt's daughter provided pt with sip of thin liquid. Pt with immediate significant coughing (wet, weak, congested cough). NP Ly made aware. of above. RN at the bedside and discussed with NP as well. Pt cleared to proceed with swallow assessment. Of note, NP made aware that pt with increased baseline coughing today. Pt left in no distress. no
